# Patient Record
Sex: FEMALE | Race: WHITE | Employment: FULL TIME | URBAN - NONMETROPOLITAN AREA
[De-identification: names, ages, dates, MRNs, and addresses within clinical notes are randomized per-mention and may not be internally consistent; named-entity substitution may affect disease eponyms.]

---

## 2022-05-06 PROBLEM — F45.8 ACUTE HYPERVENTILATION SYNDROME: Status: ACTIVE | Noted: 2018-10-19

## 2022-05-06 PROBLEM — I89.0 LYMPHEDEMA OF BOTH LOWER EXTREMITIES: Status: ACTIVE | Noted: 2018-06-05

## 2022-05-06 PROBLEM — E53.8 VITAMIN B 12 DEFICIENCY: Status: ACTIVE | Noted: 2018-08-29

## 2022-05-06 PROBLEM — E65 ABDOMINAL PANNUS: Status: ACTIVE | Noted: 2020-03-11

## 2022-05-06 PROBLEM — I83.893 VARICOSE VEINS OF LEG WITH SWELLING, BILATERAL: Status: ACTIVE | Noted: 2018-06-25

## 2022-05-06 PROBLEM — M94.0 ACUTE COSTOCHONDRITIS: Status: ACTIVE | Noted: 2019-09-04

## 2022-05-06 PROBLEM — M35.00 SICCA (HCC): Status: ACTIVE | Noted: 2019-03-22

## 2022-05-06 PROBLEM — R73.03 PREDIABETES: Status: ACTIVE | Noted: 2018-09-04

## 2023-03-14 PROBLEM — Q23.81 BICUSPID AORTIC VALVE: Status: ACTIVE | Noted: 2023-03-14

## 2023-06-19 ENCOUNTER — TELEPHONE (OUTPATIENT)
Dept: NEUROLOGY | Age: 58
End: 2023-06-19

## 2023-06-19 NOTE — TELEPHONE ENCOUNTER
Received a referral from [facility] for this patient. Called and left patient a VM for patient to call our office back to where we can get patient scheduled an appointment with Dr. Vitaly Merino.

## 2023-09-14 ENCOUNTER — OFFICE VISIT (OUTPATIENT)
Dept: NEUROLOGY | Age: 58
End: 2023-09-14
Payer: MEDICAID

## 2023-09-14 VITALS
BODY MASS INDEX: 27.64 KG/M2 | HEART RATE: 61 BPM | DIASTOLIC BLOOD PRESSURE: 77 MMHG | SYSTOLIC BLOOD PRESSURE: 117 MMHG | WEIGHT: 172 LBS | HEIGHT: 66 IN

## 2023-09-14 DIAGNOSIS — M35.00 SJOGREN'S SYNDROME, WITH UNSPECIFIED ORGAN INVOLVEMENT (HCC): ICD-10-CM

## 2023-09-14 DIAGNOSIS — R76.8 POSITIVE ANA (ANTINUCLEAR ANTIBODY): ICD-10-CM

## 2023-09-14 DIAGNOSIS — G43.809 OTHER MIGRAINE WITHOUT STATUS MIGRAINOSUS, NOT INTRACTABLE: Primary | ICD-10-CM

## 2023-09-14 DIAGNOSIS — H53.149 PHOTOPHOBIA: ICD-10-CM

## 2023-09-14 PROCEDURE — 99204 OFFICE O/P NEW MOD 45 MIN: CPT | Performed by: PSYCHIATRY & NEUROLOGY

## 2023-09-14 RX ORDER — HYDROXYZINE HYDROCHLORIDE 10 MG/1
TABLET, FILM COATED ORAL
COMMUNITY
Start: 2023-08-23

## 2023-09-14 RX ORDER — GALCANEZUMAB 120 MG/ML
INJECTION, SOLUTION SUBCUTANEOUS
COMMUNITY
Start: 2023-07-18

## 2023-09-14 RX ORDER — FERROUS SULFATE 325(65) MG
325 TABLET ORAL DAILY
COMMUNITY
Start: 2023-09-07

## 2023-09-14 RX ORDER — RIZATRIPTAN BENZOATE 5 MG/1
TABLET ORAL
COMMUNITY
Start: 2023-08-08

## 2023-09-14 RX ORDER — LANSOPRAZOLE 30 MG/1
30 CAPSULE, DELAYED RELEASE ORAL DAILY
COMMUNITY
Start: 2023-05-29

## 2023-09-14 RX ORDER — ESZOPICLONE 3 MG/1
TABLET, FILM COATED ORAL
COMMUNITY
Start: 2023-07-14

## 2023-09-14 RX ORDER — PREGABALIN 75 MG/1
75 CAPSULE ORAL 2 TIMES DAILY
COMMUNITY
Start: 2023-09-02

## 2023-09-14 RX ORDER — DICYCLOMINE HYDROCHLORIDE 10 MG/1
10 CAPSULE ORAL
COMMUNITY
Start: 2023-07-20

## 2023-09-14 RX ORDER — ATOGEPANT 60 MG/1
60 TABLET ORAL DAILY
Qty: 30 TABLET | Refills: 11 | Status: SHIPPED | OUTPATIENT
Start: 2023-09-14

## 2023-09-14 NOTE — PROGRESS NOTES
Review of Systems    Constitutional - No fever or chills. No diaphoresis or significant fatigue. HENT -  No tinnitus or significant hearing loss. Eyes - no sudden vision change or eye pain  Respiratory - no significant shortness of breath or cough  Cardiovascular - no chest pain No palpitations or significant leg swelling  Gastrointestinal - yes abdominal swelling or pain. Genitourinary - No difficulty urinating, dysuria  Musculoskeletal - no back pain or myalgia. Skin - no color change or rash  Neurologic - No seizures. No lateralizing weakness. Hematologic - no easy bruising or excessive bleeding. Psychiatric - no severe anxiety or nervousness. All other review of systems are negative.
will be tried on Qulipta to see if this provides prophylaxis for migraines. The patient indicated understanding the management plan. She is to follow-up with me in approximately 2 months and call with any further problems. Plan    No orders of the defined types were placed in this encounter. Orders Placed This Encounter   Medications    Atogepant (QULIPTA) 60 MG TABS     Sig: Take 60 mg by mouth daily     Dispense:  30 tablet     Refill:  11       Return in about 2 months (around 11/14/2023). EMR Dragon/transcription disclaimer:Significant part of this  encounter note is electronic transcription/translation of spoken language to printed text. The electronic translation of spoken language may be erroneous, or at times, nonsensical words or phrases may be inadvertently transcribed.  Although I have reviewed the note for such errors, some may still exist.

## 2023-09-19 ENCOUNTER — TELEPHONE (OUTPATIENT)
Dept: NEUROLOGY | Age: 58
End: 2023-09-19

## 2023-09-19 NOTE — TELEPHONE ENCOUNTER
Annette Rogers (KeyStephen Blizzard) - [id number] help? Call us at [phone]  Status  Sent to Plan today  Next Steps  The plan will fax you a determination, typically within 1 to 5 business days. How do I follow up? Drug  Qulipta 60MG tablets  Form  [address] [facility] Prior Authorization Form  [facility] Med[facility] Request Form for [facility] Members  [phone]  [phone]      Waiting on response from insurance company. no

## 2023-09-19 NOTE — TELEPHONE ENCOUNTER
Called the patient and left her a voicemail letting her know that the prior authorization has been submitted and she can check with her pharmacy in a few days.

## 2023-09-27 ENCOUNTER — TELEPHONE (OUTPATIENT)
Dept: NEUROLOGY | Age: 58
End: 2023-09-27

## 2023-09-27 DIAGNOSIS — G43.809 OTHER MIGRAINE WITHOUT STATUS MIGRAINOSUS, NOT INTRACTABLE: Primary | ICD-10-CM

## 2023-09-27 RX ORDER — BUTALBITAL, ACETAMINOPHEN AND CAFFEINE 50; 325; 40 MG/1; MG/1; MG/1
1 TABLET ORAL EVERY 6 HOURS PRN
Qty: 30 TABLET | Refills: 3 | Status: SHIPPED | OUTPATIENT
Start: 2023-09-27

## 2023-09-27 NOTE — TELEPHONE ENCOUNTER
She is a started Smiley Barnard recently. Would continue for at least 1 to 2 months before stopping. Sent in some Fioricet.

## 2023-09-27 NOTE — TELEPHONE ENCOUNTER
Pt called and states over the past week she has had migraines off and on. She has take the Rizatriptan and the Erving Greek and neither of those medications are helping. Pt is asking if she should continue using the Erving Jamaican and if not, what can she do? Please advise.

## 2023-11-20 ENCOUNTER — TELEPHONE (OUTPATIENT)
Dept: NEUROLOGY | Age: 58
End: 2023-11-20

## 2023-11-20 NOTE — TELEPHONE ENCOUNTER
Pt called and needs Severa Spoon, the pharmacy , [address] [place] they say they need a pre auth for this, please contact the pharmacy, thanks !

## 2023-11-21 NOTE — TELEPHONE ENCOUNTER
eladia huynh (Key: [id number])Need help? Call us at [phone]  Status  Sent to Rickey  Next Steps  The plan will fax you a determination, typically within 1 to 5 business days. How do I follow up?   Drug  Qulipta 60MG tablets  Form  [address] [facility] Prior Authorization Form  [employer] Medication Request Form for [facility] Members  [phone]  [phone]

## 2023-12-01 ENCOUNTER — OFFICE VISIT (OUTPATIENT)
Dept: NEUROLOGY | Age: 58
End: 2023-12-01
Payer: COMMERCIAL

## 2023-12-01 VITALS
DIASTOLIC BLOOD PRESSURE: 76 MMHG | SYSTOLIC BLOOD PRESSURE: 118 MMHG | HEART RATE: 71 BPM | BODY MASS INDEX: 27.64 KG/M2 | HEIGHT: 66 IN | WEIGHT: 172 LBS

## 2023-12-01 DIAGNOSIS — G43.809 OTHER MIGRAINE WITHOUT STATUS MIGRAINOSUS, NOT INTRACTABLE: Primary | ICD-10-CM

## 2023-12-01 DIAGNOSIS — H53.149 PHOTOPHOBIA: ICD-10-CM

## 2023-12-01 DIAGNOSIS — R76.8 POSITIVE ANA (ANTINUCLEAR ANTIBODY): ICD-10-CM

## 2023-12-01 DIAGNOSIS — M35.00 SJOGREN'S SYNDROME, WITH UNSPECIFIED ORGAN INVOLVEMENT (HCC): ICD-10-CM

## 2023-12-01 PROCEDURE — 3017F COLORECTAL CA SCREEN DOC REV: CPT | Performed by: PSYCHIATRY & NEUROLOGY

## 2023-12-01 PROCEDURE — G8484 FLU IMMUNIZE NO ADMIN: HCPCS | Performed by: PSYCHIATRY & NEUROLOGY

## 2023-12-01 PROCEDURE — G8427 DOCREV CUR MEDS BY ELIG CLIN: HCPCS | Performed by: PSYCHIATRY & NEUROLOGY

## 2023-12-01 PROCEDURE — 99214 OFFICE O/P EST MOD 30 MIN: CPT | Performed by: PSYCHIATRY & NEUROLOGY

## 2023-12-01 PROCEDURE — 1036F TOBACCO NON-USER: CPT | Performed by: PSYCHIATRY & NEUROLOGY

## 2023-12-01 PROCEDURE — G8419 CALC BMI OUT NRM PARAM NOF/U: HCPCS | Performed by: PSYCHIATRY & NEUROLOGY

## 2023-12-01 RX ORDER — METAXALONE 800 MG/1
800 TABLET ORAL 3 TIMES DAILY
Qty: 90 TABLET | Refills: 5 | Status: SHIPPED | OUTPATIENT
Start: 2023-12-01 | End: 2023-12-31

## 2023-12-01 RX ORDER — SEMAGLUTIDE 0.68 MG/ML
0.5 INJECTION, SOLUTION SUBCUTANEOUS
COMMUNITY
Start: 2023-11-20 | End: 2023-12-22

## 2023-12-01 NOTE — PROGRESS NOTES
Review of Systems    Constitutional - No fever or chills. No diaphoresis or significant fatigue. HENT -  No tinnitus or significant hearing loss. Eyes - no sudden vision change or eye pain  Respiratory - no significant shortness of breath or cough  Cardiovascular - no chest pain No palpitations or significant leg swelling  Gastrointestinal - no abdominal swelling or pain. Genitourinary - No difficulty urinating, dysuria  Musculoskeletal - no back pain or myalgia. Skin - no color change or rash  Neurologic - No seizures. No lateralizing weakness. Hematologic - no easy bruising or excessive bleeding. Psychiatric - no severe anxiety or nervousness. All other review of systems are negative.
tablet     Sig: Take 1 tablet by mouth 3 times daily     Dispense:  90 tablet     Refill:  5       Return in about 3 months (around 3/1/2024). EMR Dragon/transcription disclaimer:Significant part of this  encounter note is electronic transcription/translation of spoken language to printed text. The electronic translation of spoken language may be erroneous, or at times, nonsensical words or phrases may be inadvertently transcribed.  Although I have reviewed the note for such errors, some may still exist.

## 2023-12-04 ENCOUNTER — TELEPHONE (OUTPATIENT)
Dept: NEUROLOGY | Age: 58
End: 2023-12-04

## 2023-12-04 NOTE — TELEPHONE ENCOUNTER
eladia huynh (Key: [id number])  PA Case ID #: [id number]  Need Help? Call us at [phone]  Outcome  Denied on December 1  Request Reference Number: [id number]. QULIPTA TAB 60MG is denied for not meeting the prior authorization requirement(s). Details of this decision are in the notice attached below or have been faxed to you.

## 2024-02-21 ENCOUNTER — PATIENT MESSAGE (OUTPATIENT)
Dept: NEUROLOGY | Age: 59
End: 2024-02-21

## 2024-02-21 ENCOUNTER — TELEPHONE (OUTPATIENT)
Dept: NEUROLOGY | Age: 59
End: 2024-02-21

## 2024-02-21 RX ORDER — RIMEGEPANT SULFATE 75 MG/75MG
75 TABLET, ORALLY DISINTEGRATING ORAL PRN
Qty: 30 TABLET | Refills: 5 | Status: SHIPPED | OUTPATIENT
Start: 2024-02-21

## 2024-02-21 NOTE — TELEPHONE ENCOUNTER
The request for 16 NURTEC TAB 75MG ODT per month is denied. This decision is based on health plan criteria for NURTEC TAB 75MG ODT. More than 8 tablets per month is covered only if:

## 2024-02-21 NOTE — TELEPHONE ENCOUNTER
Sent in Nurtec and Ubrelvy. She saw later she has tried Ubrelvy. Can try Nurtec to see if that helps and can take every other day if needed for prophylaxis as well.

## 2024-02-21 NOTE — TELEPHONE ENCOUNTER
Sherry Fuchs (Key: [id number])  PA Case ID #: [id number]  Rx #: [id number]  Need Help? Call us at [phone]  Status  Sent to Plan today  Drug  Nurtec 75MG dispersible tablets    Form  [employer] Electronic Prior Authorization Form (2017 NCPDP)  Original Claim Info  353

## 2024-02-26 ENCOUNTER — TELEPHONE (OUTPATIENT)
Dept: NEUROLOGY | Age: 59
End: 2024-02-26

## 2024-02-26 NOTE — TELEPHONE ENCOUNTER
BÁRBARA ROLAND (Key: [id number])  PA Case ID #: [id number]  Rx #: [id number]  Need Help? Call us at [phone]  Status  Sent to Plan today  Drug  Nurtec 75MG dispersible tablets    Form  [employer] Electronic Prior Authorization Form (2017 NCPDP)  Original Claim Info

## 2024-02-28 NOTE — TELEPHONE ENCOUNTER
BÁRBARA ROLAND (Key: [id number])  PA Case ID #: [id number]  Rx #: [id number]  Need Help? Call us at [phone]  Outcome  Denied on February 27  Request Reference Number: [id number]. NURTEC TAB 75MG ODT is denied for not meeting the prior authorization requirement(s). Details of this decision are in the notice attached below or have been faxed to you.  Drug  Nurtec 75MG dispersible tablets    Form  [employer] Electronic Prior Authorization Form (2017 NCPDP)  Original Claim Info  76

## 2024-03-06 ENCOUNTER — OFFICE VISIT (OUTPATIENT)
Dept: NEUROLOGY | Age: 59
End: 2024-03-06
Payer: COMMERCIAL

## 2024-03-06 VITALS
HEIGHT: 66 IN | WEIGHT: 172 LBS | DIASTOLIC BLOOD PRESSURE: 71 MMHG | BODY MASS INDEX: 27.64 KG/M2 | SYSTOLIC BLOOD PRESSURE: 105 MMHG | HEART RATE: 63 BPM

## 2024-03-06 DIAGNOSIS — R76.8 POSITIVE ANA (ANTINUCLEAR ANTIBODY): ICD-10-CM

## 2024-03-06 DIAGNOSIS — G43.809 OTHER MIGRAINE WITHOUT STATUS MIGRAINOSUS, NOT INTRACTABLE: Primary | ICD-10-CM

## 2024-03-06 DIAGNOSIS — H53.149 PHOTOPHOBIA: ICD-10-CM

## 2024-03-06 PROCEDURE — G8484 FLU IMMUNIZE NO ADMIN: HCPCS | Performed by: PSYCHIATRY & NEUROLOGY

## 2024-03-06 PROCEDURE — G8427 DOCREV CUR MEDS BY ELIG CLIN: HCPCS | Performed by: PSYCHIATRY & NEUROLOGY

## 2024-03-06 PROCEDURE — G8419 CALC BMI OUT NRM PARAM NOF/U: HCPCS | Performed by: PSYCHIATRY & NEUROLOGY

## 2024-03-06 PROCEDURE — 1036F TOBACCO NON-USER: CPT | Performed by: PSYCHIATRY & NEUROLOGY

## 2024-03-06 PROCEDURE — 3017F COLORECTAL CA SCREEN DOC REV: CPT | Performed by: PSYCHIATRY & NEUROLOGY

## 2024-03-06 PROCEDURE — 99214 OFFICE O/P EST MOD 30 MIN: CPT | Performed by: PSYCHIATRY & NEUROLOGY

## 2024-03-06 RX ORDER — RANOLAZINE 500 MG/1
500 TABLET, EXTENDED RELEASE ORAL 2 TIMES DAILY
COMMUNITY
Start: 2024-03-06

## 2024-03-06 NOTE — PROGRESS NOTES
Chief Complaint   Patient presents with    Migraine       Sherry Fuchs is a 58 y.o. year old female who is seen for evaluation of myasthenia gravis.  The patient indicates that she has several orders including Sjogren's, lupus, and Hashimoto.  She is unclear if she has myasthenia.  She denies any weakness, diplopia, ptosis, or shortness of breath.  She has had her esophagus stretched several times.  She indicates she has been had blood test she believes for myasthenia but the results are unavailable at this time and she does not know the results.  She does have a chronic history of migraines since childhood.  About 6 or 7 years ago she had several head injuries and following this her migraines worsen.  They were daily initially but they did improve with some treatment.  Currently takes Emgality and Maxalt.  Ubrelvy did not help.  She has tried Imitrex, Topamax, and amitriptyline.  She indicates she has 3 or 4 migraines a month at this point but they wake her from sleep.  She does have chronic issues with sleep. Migraines less at night with Qulipta. Maybe 1 week in day. Maxalt for that. Pain in the neck. Insurance not cover Qulipta. Only on Ubrelvy.           Active Ambulatory Problems     Diagnosis Date Noted    Abdominal pannus 03/11/2020    Acute costochondritis 09/04/2019    Acute hyperventilation syndrome 10/19/2018    Bicuspid aortic valve 11/02/2009    Calculus of kidney 12/05/2018    Chondromalacia of left knee 12/28/2016    Chronic fatigue 03/22/2019    Chronic migraine w/o aura w/o status migrainosus, not intractable 01/05/2018    Complication of surgical procedure 01/06/2015    Congenital insufficiency of aortic valve 11/02/2009    DDD (degenerative disc disease), lumbar 01/06/2015    Esophageal reflux 11/02/2009    Hashimoto's thyroiditis 07/11/2016    Hypoglycemia 07/08/2020    Irritable bowel syndrome without diarrhea 08/10/2016    Lumbar facet arthropathy 01/06/2015    Lymphedema of both lower 
Review of Systems    Constitutional - No fever or chills.  No diaphoresis or significant fatigue.  HENT -  No tinnitus or significant hearing loss.  Eyes - yes sudden vision change or eye pain  Respiratory - yes significant shortness of breath or cough  Cardiovascular - yes chest pain yes palpitations yes significant leg swelling  Gastrointestinal - no abdominal swelling or pain.    Genitourinary - No difficulty urinating, dysuria  Musculoskeletal - yes back pain or myalgia.  Skin - no color change or rash  Neurologic - No seizures.  No lateralizing weakness.  Hematologic - yes easy bruising or excessive bleeding.  Psychiatric - no severe anxiety or nervousness.   All other review of systems are negative.    
stable

## 2024-04-22 ENCOUNTER — TELEPHONE (OUTPATIENT)
Dept: CARDIAC SURGERY | Facility: CLINIC | Age: 59
End: 2024-04-22

## 2024-04-22 NOTE — TELEPHONE ENCOUNTER
Pt called back re: this and states that Dr Wolf was supposed to have called last week re: this.  Pt to have her testing here so she does not have to drive all the way to [facility] just for the testing.  Informed pt that we did not have a message re: this and that I would add this to her message to see if possibly Dr Wolf relayed this request directly to Dr Washington and that he hasn't given us the order yet since he's been in surgery.  Pt voiced understanding/kahm

## 2024-04-22 NOTE — TELEPHONE ENCOUNTER
"  Caller: Kelli Carter \"Carli\"    Relationship: Self    Best call back number: [phone]    What is the best time to reach you: ANY    Who are you requesting to speak with (clinical staff, provider,  specific staff member): DR. RIVERO NURSE    Do you know the name of the person who called: PT    What was the call regarding: PT STATES SHE WAS SUPPOSED TO BE GETTING A CALL FROM US TO DO A NUCLEAR TEST THIS WEEK AND SHE HAS NOT BEEN CONTACTED. PLEASE GIVE HER A CALL TO DISCUSS SCHEDULING. THANK YOU    Is it okay if the provider responds through iPositioning[employer]: NO        "

## 2024-04-24 NOTE — TELEPHONE ENCOUNTER
Called patient. She reports lots of anxiety, increase in shortness of breath and fatigue. Dr. Washington discussed his conversation with Dr. Wolf and Dr. Bay-recommend additional testing to further evaluate myocardial bridging, she is agreeable to drive to [place] for this test as it is not available here. A PET scan is recommended. She would like to do it 4/29-4/30 or 5/6 as she is off work these days.     She was advised if she wishes to proceed with surgical workup here than she needs this test; however if she wants to proceed with surgery at [facility], this testing is to their discretion. She relays that she wishes to proceed with surgery at [facility] by Dr. Wolf. She was advised that all follow up visits will be by Dr. Wolf or his staff and not here. She verbalizes understanding. Defer additional testing to Dr. Bay. mg

## 2024-04-24 NOTE — TELEPHONE ENCOUNTER
Pt calling back.  HUB attempted to inform pt that Dr Wolf could send order for this testing but pt informed them that Dr Wolf spoke with Dr Washington before Dr Wolf left out of the country on vacation and that Dr Washington told him he would take care of this.  Pt is adamant that she needs a call back today about this.  Informed pt I would put a message through with this updated information/coco

## 2024-05-21 ENCOUNTER — PATIENT MESSAGE (OUTPATIENT)
Dept: INTERNAL MEDICINE | Facility: CLINIC | Age: 59
End: 2024-05-21
Payer: COMMERCIAL

## 2024-05-22 ENCOUNTER — OFFICE VISIT (OUTPATIENT)
Dept: INTERNAL MEDICINE | Facility: CLINIC | Age: 59
End: 2024-05-22
Payer: COMMERCIAL

## 2024-05-22 VITALS
SYSTOLIC BLOOD PRESSURE: 108 MMHG | BODY MASS INDEX: 26.13 KG/M2 | WEIGHT: 162.6 LBS | HEART RATE: 43 BPM | DIASTOLIC BLOOD PRESSURE: 74 MMHG | HEIGHT: 66 IN | OXYGEN SATURATION: 99 % | TEMPERATURE: 97.4 F

## 2024-05-22 DIAGNOSIS — E03.9 ACQUIRED HYPOTHYROIDISM: ICD-10-CM

## 2024-05-22 DIAGNOSIS — E11.9 TYPE 2 DIABETES MELLITUS WITHOUT COMPLICATION, WITHOUT LONG-TERM CURRENT USE OF INSULIN: ICD-10-CM

## 2024-05-22 DIAGNOSIS — F06.4 ANXIETY DISORDER DUE TO MEDICAL CONDITION: Primary | ICD-10-CM

## 2024-05-22 PROCEDURE — 99213 OFFICE O/P EST LOW 20 MIN: CPT | Performed by: INTERNAL MEDICINE

## 2024-05-22 NOTE — PROGRESS NOTES
"    Chief Complaint  Anxiety (Possible change in medication, having memory loss that she has not had before)    Subjective        Kelli Pedro presents to [facility]  Anxiety      See below.     Objective   Vital Signs:  /74 (BP Location: Left arm, Patient Position: Sitting, Cuff Size: Adult)   Pulse (!) 43   Temp 97.4 °F (36.3 °C) (Temporal)   Ht 167.6 cm (65.98\")   Wt 73.8 kg (162 lb 9.6 oz)   SpO2 99%   BMI 26.26 kg/m²   Estimated body mass index is 26.26 kg/m² as calculated from the following:    Height as of this encounter: 167.6 cm (65.98\").    Weight as of this encounter: 73.8 kg (162 lb 9.6 oz).         Physical Exam  HENT:      Head: Normocephalic and atraumatic.   Eyes:      Conjunctiva/sclera: Conjunctivae normal.      Pupils: Pupils are equal, round, and reactive to light.   Cardiovascular:      Rate and Rhythm: Normal rate and regular rhythm.      Heart sounds: Normal heart sounds.   Pulmonary:      Effort: Pulmonary effort is normal. No respiratory distress.      Breath sounds: Normal breath sounds.   Musculoskeletal:         General: No swelling.   Skin:     General: Skin is warm and dry.      Findings: No rash.   Neurological:      General: No focal deficit present.      Mental Status: She is alert and oriented to person, place, and time.   Psychiatric:         Mood and Affect: Mood normal.         Behavior: Behavior normal.         Thought Content: Thought content normal.         Judgment: Judgment normal.       Result Review :  Nothing new to review.         Assessment and Plan   Diagnoses and all orders for this visit:    1. Anxiety disorder due to medical condition (Primary)    2. Acquired hypothyroidism  -     TSH  -     T4, free    3. Type 2 diabetes mellitus without complication, without long-term current use of insulin  -     Hemoglobin A1c       Presents today because she feels she is having side effects to the recent start of anxiety " "medications.      I saw her at the end of April for complaints of anxiety primarily secondary to her upcoming surgical procedure at [facility] in [place].  I placed her on BuSpar and Paxil.  Her anxiety is much better controlled.  I think that is due to the Paxil after discussing this with her.  She was initially taking the BuSpar 3 times a day scheduled instead of as needed.  She has started backing off and has only been taking it once daily.  She feels like after she takes it she gets forgetful at work and feels \"foggy.\"  Stay on Paxil. Stop Buspar.  I feel like BuSpar is more likely the culprit of the issue.    She has not had thyroid function since November.  Check TFTs.    Also due for an A1c.    She states that she is now required to do some other testing prior to her surgical intervention at [facility].  She was initially scheduled to undergo the procedure on 5/31 and this has now been pushed to 6/18.    We have a standing appointment at the end of July, but she also knows that she will likely follow-up with us shortly after her surgical intervention.        Follow Up   Return for Next scheduled follow up.  Patient was given instructions and counseling regarding her condition or for health maintenance advice. Please see specific information pulled into the AVS if appropriate.      HALLE Tidwell DO       Electronically signed by ELLEN Tidwell DO, 05/22/24, 9:23 AM CDT.  "

## 2024-05-23 LAB
HBA1C MFR BLD: 5.2 % (ref 4.8–5.6)
T4 FREE SERPL-MCNC: 1.23 NG/DL (ref 0.93–1.7)
TSH SERPL DL<=0.005 MIU/L-ACNC: 1.19 UIU/ML (ref 0.27–4.2)

## 2024-05-24 ENCOUNTER — TELEPHONE (OUTPATIENT)
Dept: INTERNAL MEDICINE | Facility: CLINIC | Age: 59
End: 2024-05-24

## 2024-05-24 NOTE — TELEPHONE ENCOUNTER
"Caller: Kelli Pedro \"Carli\"    Relationship: Self    Best call back number: [phone]     What form or medical record are you requesting: PRIOR AUTHORIZATION FOR LIDOCAINE PATCHES    Who is requesting this form or medical record from you: SELF    How would you like to receive the form or medical records (pick-up, mail, fax): FAX    Timeframe paperwork needed: AS SOON AS POSSIBLE    Additional notes: PLEASE CALL BACK WHEN THIS HAS BEEN APPROVED.           "

## 2024-05-28 ENCOUNTER — TELEPHONE (OUTPATIENT)
Dept: INTERNAL MEDICINE | Facility: CLINIC | Age: 59
End: 2024-05-28

## 2024-05-28 DIAGNOSIS — F51.04 PSYCHOPHYSIOLOGICAL INSOMNIA: ICD-10-CM

## 2024-05-28 DIAGNOSIS — F06.4 ANXIETY DISORDER DUE TO MEDICAL CONDITION: ICD-10-CM

## 2024-05-28 RX ORDER — PAROXETINE 10 MG/1
10 TABLET, FILM COATED ORAL EVERY MORNING
Qty: 30 TABLET | Refills: 1 | Status: SHIPPED | OUTPATIENT
Start: 2024-05-28

## 2024-05-28 RX ORDER — ESZOPICLONE 3 MG/1
3 TABLET, FILM COATED ORAL NIGHTLY
Qty: 90 TABLET | Refills: 0 | Status: SHIPPED | OUTPATIENT
Start: 2024-05-28

## 2024-05-28 NOTE — TELEPHONE ENCOUNTER
Caller: neelam- -[employer]    Relationship: Other    Best call back number:     [phone]     Who are you requesting to speak with (clinical staff, provider,  specific staff member):CLINICAL    What was the call regarding:  NEELAM FROM [employer] STATES THE PATIENT'S LIDOCAINE PATCHES WERE DENIED AND STATES THAT SHE WOULD LIKE TO PROVIDE THE [facility] APPEALS NUMBER TO THE CLINICAL TEAM: [phone]  ADDITIONALLY, NEELAM STATES THAT SHE IS SENDING A FAX TO THE OFFICE WITH THE DENIAL REASON.

## 2024-06-11 ENCOUNTER — LAB (OUTPATIENT)
Dept: LAB | Facility: HOSPITAL | Age: 59
End: 2024-06-11
Payer: COMMERCIAL

## 2024-06-11 ENCOUNTER — APPOINTMENT (OUTPATIENT)
Dept: LAB | Facility: HOSPITAL | Age: 59
End: 2024-06-11
Payer: COMMERCIAL

## 2024-06-11 ENCOUNTER — TRANSCRIBE ORDERS (OUTPATIENT)
Dept: ADMINISTRATIVE | Facility: HOSPITAL | Age: 59
End: 2024-06-11
Payer: COMMERCIAL

## 2024-06-11 DIAGNOSIS — Z79.899 ENCOUNTER FOR LONG-TERM (CURRENT) USE OF OTHER MEDICATIONS: ICD-10-CM

## 2024-06-11 DIAGNOSIS — E11.65 TYPE 2 DIABETES MELLITUS WITH HYPERGLYCEMIA, WITHOUT LONG-TERM CURRENT USE OF INSULIN: Primary | ICD-10-CM

## 2024-06-11 DIAGNOSIS — D64.9 ANEMIA, UNSPECIFIED TYPE: Primary | ICD-10-CM

## 2024-06-11 DIAGNOSIS — E11.65 TYPE 2 DIABETES MELLITUS WITH HYPERGLYCEMIA, WITHOUT LONG-TERM CURRENT USE OF INSULIN: ICD-10-CM

## 2024-06-11 DIAGNOSIS — D64.9 ANEMIA, UNSPECIFIED TYPE: ICD-10-CM

## 2024-06-11 DIAGNOSIS — M32.19 DILATED CARDIOMYOPATHY SECONDARY TO SYSTEMIC LUPUS ERYTHEMATOSUS: ICD-10-CM

## 2024-06-11 DIAGNOSIS — E03.8 OTHER SPECIFIED HYPOTHYROIDISM: ICD-10-CM

## 2024-06-11 DIAGNOSIS — I43 DILATED CARDIOMYOPATHY SECONDARY TO SYSTEMIC LUPUS ERYTHEMATOSUS: ICD-10-CM

## 2024-06-11 LAB
ALBUMIN SERPL-MCNC: 4.2 G/DL (ref 3.5–5.2)
ALBUMIN/GLOB SERPL: 1.7 G/DL
ALP SERPL-CCNC: 103 U/L (ref 39–117)
ALT SERPL W P-5'-P-CCNC: 13 U/L (ref 1–33)
ANION GAP SERPL CALCULATED.3IONS-SCNC: 8 MMOL/L (ref 5–15)
AST SERPL-CCNC: 16 U/L (ref 1–32)
BASOPHILS # BLD AUTO: 0.03 10*3/MM3 (ref 0–0.2)
BASOPHILS NFR BLD AUTO: 0.8 % (ref 0–1.5)
BILIRUB SERPL-MCNC: 0.4 MG/DL (ref 0–1.2)
BUN SERPL-MCNC: 12 MG/DL (ref 6–20)
BUN/CREAT SERPL: 16.2 (ref 7–25)
C3 SERPL-MCNC: 142 MG/DL (ref 82–167)
C4 SERPL-MCNC: 22 MG/DL (ref 14–44)
CALCIUM SPEC-SCNC: 9.2 MG/DL (ref 8.6–10.5)
CHLORIDE SERPL-SCNC: 100 MMOL/L (ref 98–107)
CO2 SERPL-SCNC: 31 MMOL/L (ref 22–29)
CREAT SERPL-MCNC: 0.74 MG/DL (ref 0.57–1)
DEPRECATED RDW RBC AUTO: 45.9 FL (ref 37–54)
EGFRCR SERPLBLD CKD-EPI 2021: 93.9 ML/MIN/1.73
EOSINOPHIL # BLD AUTO: 0.06 10*3/MM3 (ref 0–0.4)
EOSINOPHIL NFR BLD AUTO: 1.6 % (ref 0.3–6.2)
ERYTHROCYTE [DISTWIDTH] IN BLOOD BY AUTOMATED COUNT: 13.4 % (ref 12.3–15.4)
FERRITIN SERPL-MCNC: 60.74 NG/ML (ref 13–150)
GLOBULIN UR ELPH-MCNC: 2.5 GM/DL
GLUCOSE SERPL-MCNC: 72 MG/DL (ref 65–99)
HBA1C MFR BLD: 5 % (ref 4.8–5.6)
HCT VFR BLD AUTO: 37.6 % (ref 34–46.6)
HGB BLD-MCNC: 12.6 G/DL (ref 12–15.9)
IMM GRANULOCYTES # BLD AUTO: 0.01 10*3/MM3 (ref 0–0.05)
IMM GRANULOCYTES NFR BLD AUTO: 0.3 % (ref 0–0.5)
IRON 24H UR-MRATE: 98 MCG/DL (ref 37–145)
IRON SATN MFR SERPL: 26 % (ref 20–50)
LYMPHOCYTES # BLD AUTO: 0.89 10*3/MM3 (ref 0.7–3.1)
LYMPHOCYTES NFR BLD AUTO: 24.3 % (ref 19.6–45.3)
MCH RBC QN AUTO: 31.5 PG (ref 26.6–33)
MCHC RBC AUTO-ENTMCNC: 33.5 G/DL (ref 31.5–35.7)
MCV RBC AUTO: 94 FL (ref 79–97)
MONOCYTES # BLD AUTO: 0.3 10*3/MM3 (ref 0.1–0.9)
MONOCYTES NFR BLD AUTO: 8.2 % (ref 5–12)
NEUTROPHILS NFR BLD AUTO: 2.37 10*3/MM3 (ref 1.7–7)
NEUTROPHILS NFR BLD AUTO: 64.8 % (ref 42.7–76)
NRBC BLD AUTO-RTO: 0 /100 WBC (ref 0–0.2)
PLATELET # BLD AUTO: 178 10*3/MM3 (ref 140–450)
PMV BLD AUTO: 11.4 FL (ref 6–12)
POTASSIUM SERPL-SCNC: 3.7 MMOL/L (ref 3.5–5.2)
PROT SERPL-MCNC: 6.7 G/DL (ref 6–8.5)
RBC # BLD AUTO: 4 10*6/MM3 (ref 3.77–5.28)
SODIUM SERPL-SCNC: 139 MMOL/L (ref 136–145)
T4 FREE SERPL-MCNC: 1.21 NG/DL (ref 0.93–1.7)
TIBC SERPL-MCNC: 381 MCG/DL (ref 298–536)
TRANSFERRIN SERPL-MCNC: 256 MG/DL (ref 200–360)
TSH SERPL DL<=0.05 MIU/L-ACNC: 1.99 UIU/ML (ref 0.27–4.2)
WBC NRBC COR # BLD AUTO: 3.66 10*3/MM3 (ref 3.4–10.8)

## 2024-06-11 PROCEDURE — 84466 ASSAY OF TRANSFERRIN: CPT

## 2024-06-11 PROCEDURE — 86160 COMPLEMENT ANTIGEN: CPT

## 2024-06-11 PROCEDURE — 84439 ASSAY OF FREE THYROXINE: CPT

## 2024-06-11 PROCEDURE — 83540 ASSAY OF IRON: CPT

## 2024-06-11 PROCEDURE — 82728 ASSAY OF FERRITIN: CPT

## 2024-06-11 PROCEDURE — 83036 HEMOGLOBIN GLYCOSYLATED A1C: CPT

## 2024-06-11 PROCEDURE — 36415 COLL VENOUS BLD VENIPUNCTURE: CPT

## 2024-06-11 PROCEDURE — 84443 ASSAY THYROID STIM HORMONE: CPT

## 2024-06-11 PROCEDURE — 85025 COMPLETE CBC W/AUTO DIFF WBC: CPT

## 2024-06-11 PROCEDURE — 80053 COMPREHEN METABOLIC PANEL: CPT

## 2024-06-26 ENCOUNTER — TELEPHONE (OUTPATIENT)
Dept: INTERNAL MEDICINE | Facility: CLINIC | Age: 59
End: 2024-06-26

## 2024-06-26 NOTE — TELEPHONE ENCOUNTER
[facility] health called back   Let them know Diann will follow but patient will need to call and schedule a hospital follow up and keep appointment for us to continue to sign off on home health

## 2024-06-26 NOTE — TELEPHONE ENCOUNTER
Caller: ALEXSANDER- GRACIELA Quaker    Relationship: Other    Best call back number: [phone] - [facility]      Who are you requesting to speak with (clinical staff, provider,  specific staff member): CLINICAL    What was the call regarding: ALEXSANDER STATES THE PATIENT WILL BE DISCHARGED FROM [facility] ON 6/27/24 AND NEEDS A CALLBACK AS SOON AS POSSIBLE TO CONFIRM IF DR. GARCIA WILL SIGN THE PATIENT'S HOME HEALTH PAPERWORK AND FOLLOW THE PATIENT FOLLOWING DISCHARGE ON 6/27/24.

## 2024-06-28 ENCOUNTER — TELEPHONE (OUTPATIENT)
Dept: CARDIOLOGY | Facility: CLINIC | Age: 59
End: 2024-06-28

## 2024-06-28 ENCOUNTER — READMISSION MANAGEMENT (OUTPATIENT)
Dept: CALL CENTER | Facility: HOSPITAL | Age: 59
End: 2024-06-28
Payer: COMMERCIAL

## 2024-06-28 ENCOUNTER — TRANSITIONAL CARE MANAGEMENT TELEPHONE ENCOUNTER (OUTPATIENT)
Dept: CALL CENTER | Facility: HOSPITAL | Age: 59
End: 2024-06-28
Payer: COMMERCIAL

## 2024-06-28 NOTE — OUTREACH NOTE
Prep Survey      Flowsheet Row Responses   [place] facility patient discharged from? Non-BH   Is LACE score < 7 ? Non-BH Discharge   Eligibility [facility]   Date of Admission 06/26/24   Date of Discharge 06/26/24   Discharge Disposition Home or Self Care   Discharge diagnosis IMPLANT DUAL CHAMBER PPM SYSTEM W/ DUAL ELECTRODES (GEN AND LEADS, NEW OR REPLACE)   Does the patient have one of the following disease processes/diagnoses(primary or secondary)? Other   Does the patient have Home health ordered? No   Is there a DME ordered? No   Prep survey completed? Yes            Charmaine HAMMOND - Registered Nurse

## 2024-06-28 NOTE — TELEPHONE ENCOUNTER
"Caller: Kelli Pedro \"Carli\"    Relationship to patient: Self    Best call back number: [phone]     Chief complaint: PT STATED THAT SHE HAD 3 HEART SURGERIES ON 6.18.24 AND 6.26.24. SHE IS UPSET ABOUT HER APPT BEING CHANGED TO AUGUST BECAUSE SHE FEELS LIKE SHE SHOULD NOT WAIT UNTIL AUGUST TO BE SEEN    Type of visit: FOLLOW UP     Requested date: AROUND JULY      If rescheduling, when is the original appointment: 8.27.24     Additional notes:       "

## 2024-06-28 NOTE — OUTREACH NOTE
Call Center TCM Note      Flowsheet Row Responses   [facility] [place] patient discharged from? Non-   Does the patient have one of the following disease processes/diagnoses(primary or secondary)? Other   TCM attempt successful? Yes   Call start time 1459   Call end time 1502   Discharge diagnosis IMPLANT DUAL CHAMBER PPM SYSTEM W/ DUAL ELECTRODES (GEN AND LEADS, NEW OR REPLACE)   Person spoke with today (if not patient) and relationship pt   Meds reviewed with patient/caregiver? Yes   Is the patient having any side effects they believe may be caused by any medication additions or changes? No   Does the patient have all medications ordered at discharge? Yes   Is the patient taking all medications as directed (includes completed medication regime)? Yes   Comments Pt awaiting for cardiology to call with fu appts   Does the patient have an appointment with their PCP within 7-14 days of discharge? Yes   Psychosocial issues? No   Did the patient receive a copy of their discharge instructions? Yes   Nursing interventions Reviewed instructions with patient   What is the patient's perception of their health status since discharge? Improving   Is the patient/caregiver able to teach back signs and symptoms related to disease process for when to call PCP? Yes   Is the patient/caregiver able to teach back signs and symptoms related to disease process for when to call 911? Yes   Is the patient/caregiver able to teach back the hierarchy of who to call/visit for symptoms/problems? PCP, Specialist, Home health nurse, Urgent Care, ED, 911 Yes   If the patient is a current smoker, are they able to teach back resources for cessation? Not a smoker   TCM call completed? Yes   Wrap up additional comments Pt denies any issues with incisions. Pt shares Lipitor and aspirin were added to medication regime. Pt has PCP fu appt, and awaiting for cardiology/electrophysiologist office to call with appt.   Call end time 1502   Would this patient  benefit from a Referral to [facility] Social Work? No   Is the patient interested in additional calls from an ambulatory ? No            Sarai Brownlee RN    6/28/2024, 15:05 CDT

## 2024-07-03 ENCOUNTER — OFFICE VISIT (OUTPATIENT)
Dept: INTERNAL MEDICINE | Facility: CLINIC | Age: 59
End: 2024-07-03
Payer: COMMERCIAL

## 2024-07-03 ENCOUNTER — TELEPHONE (OUTPATIENT)
Dept: CARDIOLOGY | Facility: CLINIC | Age: 59
End: 2024-07-03
Payer: COMMERCIAL

## 2024-07-03 DIAGNOSIS — I10 ESSENTIAL HYPERTENSION: ICD-10-CM

## 2024-07-03 DIAGNOSIS — Z95.0 STATUS POST PLACEMENT OF CARDIAC PACEMAKER: ICD-10-CM

## 2024-07-03 DIAGNOSIS — Z95.2 STATUS POST AORTIC VALVE REPLACEMENT: ICD-10-CM

## 2024-07-03 DIAGNOSIS — Z09 HOSPITAL DISCHARGE FOLLOW-UP: Primary | ICD-10-CM

## 2024-07-03 DIAGNOSIS — E11.9 TYPE 2 DIABETES MELLITUS WITHOUT COMPLICATION, WITHOUT LONG-TERM CURRENT USE OF INSULIN: ICD-10-CM

## 2024-07-03 DIAGNOSIS — Z95.828 HISTORY OF AORTIC ARCH REPLACEMENT: ICD-10-CM

## 2024-07-03 DIAGNOSIS — Q24.5 CORONARY-MYOCARDIAL BRIDGE: ICD-10-CM

## 2024-07-03 DIAGNOSIS — I44.2 INTERMITTENT COMPLETE HEART BLOCK: ICD-10-CM

## 2024-07-03 NOTE — PROGRESS NOTES
"    Chief Complaint  Hospital Follow Up Visit (D/C [facility] 6/27/24)    Sarika Pedro presents to [facility]  History of Present Illness  See below.     Objective   Vital Signs:  /80 (BP Location: Right arm, Patient Position: Sitting, Cuff Size: Adult)   Pulse 103   Temp 97.2 °F (36.2 °C) (Temporal)   Ht 167.6 cm (66\")   Wt 74.8 kg (165 lb)   SpO2 99%   BMI 26.63 kg/m²   Estimated body mass index is 26.63 kg/m² as calculated from the following:    Height as of this encounter: 167.6 cm (66\").    Weight as of this encounter: 74.8 kg (165 lb).         Physical Exam  HENT:      Head: Normocephalic and atraumatic.   Eyes:      Conjunctiva/sclera: Conjunctivae normal.      Pupils: Pupils are equal, round, and reactive to light.   Cardiovascular:      Rate and Rhythm: Normal rate and regular rhythm.      Heart sounds: Normal heart sounds.      Comments: Sternotomy well-healing.  Pacer pocket well-healing.  Pulmonary:      Effort: Pulmonary effort is normal. No respiratory distress.      Breath sounds: Normal breath sounds.   Musculoskeletal:         General: No swelling.      Cervical back: Neck supple.   Skin:     General: Skin is warm and dry.      Findings: No rash.   Neurological:      General: No focal deficit present.      Mental Status: She is alert and oriented to person, place, and time.   Psychiatric:         Mood and Affect: Mood normal.         Behavior: Behavior normal.         Thought Content: Thought content normal.         Judgment: Judgment normal.        Result Review :  BMP on 6/26 showed a creatinine 0.8.  CBC on 6/26 showed a hemoglobin of 9.1.  Her hemoglobin prior to surgery was 12.5.  This was drawn on 6/7.    PA and lateral chest x-ray from 6/27 showed partial left lower lobar collapse and atelectasis in the left lung   base, increased from comparison.  Small left-sided pleural effusion. No right-sided pleural effusion.  No " pneumothorax or pulmonary edema. The cardiomediastinal contours are unchanged.     Echocardiogram from 6/24 showed a technically difficult study: Normal LV size, increased LV   wall thickness. LVEF 75%. Normal RV function. S/p bioAVR:  no AR, mean 6mmHg. Normal LA. Estimated PASP 19mmHg+RA pressure. IVC is not well visualized.          Assessment and Plan   Diagnoses and all orders for this visit:    1. Hospital discharge follow-up (Primary)    2. History of aortic arch replacement    3. Status post aortic valve replacement    4. Intermittent complete heart block    5. Status post placement of cardiac pacemaker    6. Coronary-myocardial bridge    7. Essential hypertension    8. Type 2 diabetes mellitus without complication, without long-term current use of insulin       The patient has a known history of 4.5 cm ascending aortic aneurysm with a bicuspid aortic valve with calcification.  She was also previously diagnosed with myocardial bridging on heart catheterization by Dr. Bay.  She had been seen locally by Dr. Washington.  She sought a second opinion at [facility] in [place], [place].  On 6/18, she underwent hemiarch replacement, aortic valve replacement with a bioprosthetic valve, unroofing of myocardial bridge of the LAD.  These were done by Dr. Wolf.  Overall, she did well post procedurally, but had episodes of intermittent complete heart block.  She continued to require temporary pacing and ultimately underwent a [employer]tr[employer] PPM placement on 6/26 with Dr. Gilmore.  She was discharged the following day on 6/27.    Today in the office, she looks very well.  She states that she is improving on a daily basis.  The chest discomfort that preceded the surgery has gone away, but of course she is now dealing with discomfort from her recent sternotomy.  It appears to be well-healing.  She states that she is to be on sternal precautions for 12 weeks.  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  Her  is present with her today.  He is pleased with her progress.    She is no longer on diltiazem CD.  She was on this previously to help combat myocardial bridging.  She has also come off Ranexa.  Blood pressure appears to be doing well at 130/80.  Continue ambulatory monitoring.    She is off Tradjenta.  She is no longer on GLP-1.  She follows with endocrinology in [place].  She has a freestyle joe to monitor blood sugars.  Her most recent hemoglobin A1c remained normal at 5.0.    She does not have any other complaints or have any other needs today.  This was simply a hospital follow-up.  It was not a TCM as contact was not made.  She follows with Dr. Bay again on 7/29 and sees Dr. Wolf in [place] on 8/12.  I have routine follow-up with her on 10/2, but she does know that she can reach out sooner if she has any problems.      Follow Up   Return in about 3 months (around 10/3/2024) for Recheck.  Patient was given instructions and counseling regarding her condition or for health maintenance advice. Please see specific information pulled into the AVS if appropriate.      HALLE Tidwell DO       Electronically signed by ELLEN Tidwell DO, 07/03/24, 2:57 PM CDT.

## 2024-07-03 NOTE — TELEPHONE ENCOUNTER
"  Caller: Abundio Ross \"Carli\"    Relationship: Self    Best call back number: [phone]     What is the best time to reach you: ANYTIME    Who are you requesting to speak with (clinical staff, provider,  specific staff member): [employer]INCAL STAFF    Do you know the name of the person who called: ABUNDIO ROSS    What was the call regarding: PATIENT WAS RECOMMENDED TO GET A MEMBERSHIP WITH A LOCAL AIR EVAC COMPANY IN CASE SHE NEEDS TO BE AIR LIFTED TO [facility] FOR TREATMENT. SHE IS NEEDING INFORMATION ON LOCAL COMPANIES THAT OFFER THIS SERVICE AS WELL AS INFORMATION IF THEY WILL FLY PATIENT'S OUT OF [facility] [place], operated by [facility].    "

## 2024-07-05 VITALS
OXYGEN SATURATION: 99 % | HEIGHT: 66 IN | HEART RATE: 103 BPM | DIASTOLIC BLOOD PRESSURE: 80 MMHG | WEIGHT: 165 LBS | TEMPERATURE: 97.2 F | SYSTOLIC BLOOD PRESSURE: 130 MMHG | BODY MASS INDEX: 26.52 KG/M2

## 2024-07-07 ENCOUNTER — NURSE TRIAGE (OUTPATIENT)
Dept: CALL CENTER | Facility: HOSPITAL | Age: 59
End: 2024-07-07
Payer: COMMERCIAL

## 2024-07-07 NOTE — TELEPHONE ENCOUNTER
" She recently had a triple bypass at [facility] and was released on 06/27/24- Attempted to triage the call. The caller declined requesting that the on call provider be notified. She is having vaginal bleeding. She started having vaginal bleeding after the trujillo cath was removed.She has not had a  menstrual cycle in many  year. Offered ER  and she declined.  She states is very little on  sanitary pad. It is red blood. No pain with urination. She states she is on baby asa as a blood thinner.     Notified STEPHANY Parker NP on call.   Referral to obgyn. Remind if get light heading go to the ER. Need to contact the office on Monday for a referral.     She did call [facility] and was not able to reach anyone.   Reason for Disposition   [1] Caller requests to speak ONLY to PCP AND [2] URGENT question    Additional Information   Negative: Lab calling with strep throat test results and triager can call in prescription   Negative: Lab calling with urinalysis test results and triager can call in prescription   Negative: Medication questions   Negative: Medication renewal and refill questions   Negative: Pre-operative or pre-procedural questions   Negative: ED call to PCP (i.e., primary care provider; doctor, NP, or PA)   Negative: Doctor (or NP/PA) call to PCP   Negative: Call about patient who is currently hospitalized   Negative: Lab or radiology calling with CRITICAL test results   Negative: [1] Follow-up call from patient regarding patient's clinical status AND [2] information urgent    Answer Assessment - Initial Assessment Questions  1. REASON FOR CALL or QUESTION: \"What is your reason for calling today?\" or \"How can I best  help you?\" or \"What question do you have that I can help answer?\"      See note   2. CALLER: Document the source of call. (e.g., laboratory, patient).      Patient.    Protocols used: PCP Call - No Triage-ADULT-    "

## 2024-07-08 DIAGNOSIS — N93.9 VAGINAL BLEEDING: Primary | ICD-10-CM

## 2024-07-22 ENCOUNTER — OFFICE VISIT (OUTPATIENT)
Dept: GASTROENTEROLOGY | Facility: CLINIC | Age: 59
End: 2024-07-22
Payer: COMMERCIAL

## 2024-07-22 VITALS
DIASTOLIC BLOOD PRESSURE: 74 MMHG | BODY MASS INDEX: 26.23 KG/M2 | HEART RATE: 96 BPM | OXYGEN SATURATION: 98 % | WEIGHT: 163.2 LBS | SYSTOLIC BLOOD PRESSURE: 110 MMHG | TEMPERATURE: 97.1 F | HEIGHT: 66 IN

## 2024-07-22 DIAGNOSIS — Z78.9 NONSMOKER: ICD-10-CM

## 2024-07-22 DIAGNOSIS — K59.01 SLOW TRANSIT CONSTIPATION: Primary | ICD-10-CM

## 2024-07-22 DIAGNOSIS — R60.0 PEDAL EDEMA: ICD-10-CM

## 2024-07-22 DIAGNOSIS — R74.8 ELEVATED ALKALINE PHOSPHATASE LEVEL: ICD-10-CM

## 2024-07-22 PROCEDURE — 99213 OFFICE O/P EST LOW 20 MIN: CPT | Performed by: CLINICAL NURSE SPECIALIST

## 2024-07-22 RX ORDER — ACETAMINOPHEN 500 MG
500 TABLET ORAL EVERY 4 HOURS PRN
COMMUNITY
Start: 2024-06-18

## 2024-07-22 RX ORDER — NALOXONE HYDROCHLORIDE 4 MG/.1ML
1 SPRAY NASAL AS NEEDED
COMMUNITY
Start: 2024-07-01

## 2024-07-22 RX ORDER — TRAMADOL HYDROCHLORIDE 50 MG/1
50 TABLET ORAL
COMMUNITY
Start: 2024-07-01

## 2024-07-22 RX ORDER — ASPIRIN 81 MG/1
81 TABLET, CHEWABLE ORAL DAILY
COMMUNITY
Start: 2024-06-18 | End: 2025-06-29

## 2024-07-22 RX ORDER — ATORVASTATIN CALCIUM 40 MG/1
40 TABLET, FILM COATED ORAL DAILY
COMMUNITY
Start: 2024-06-18 | End: 2025-06-28

## 2024-07-22 NOTE — PROGRESS NOTES
Kelli Pedro  1965      7/22/2024  Chief Complaint   Patient presents with    GI Problem     3 month fu         HPI    Kelli Pedro is a  58 y.o. female here for a follow up visit for IBS C  with extensive GI workup. She was pending cardiac surgery May 31st. She has had a colonoscopy in 2023 at [facility]. She is on Linzess 290 mcg for constipation. She says today that this is better. She did have her cardiac procedure and is recovering well. Overall no GI related issues.   ALKP was elevated at 118 on 4/1/24 6/11/24 incidental findings. ALKP 103 most recently. No associated symptoms.   Overall a difficult situation given her multiple medical issues and complaints in the setting of cardiac related findings.     4/23/24  Kelli Pedro is a 58 y.o. female who presents with a continued complaint of LLQ abdominal pain.   I last saw her on 3/13/24 with complaint of NCCP 1/18/24   She had endo/colon in 2023 and I ordered upper GI to evaluate. Small hiatal hernia observed no reflux. CT angio of the chest with contrast performed and being followed by Dr Washington. She says that Nitro eradicates her symptoms completely.   Heart cath showed no significant CAD, she had significant myocardial bridging and vasospasm noted mid LAD causing STANLEY II flow during episode of vasospasm. Followed for aortic root aneurysm as well.   She has called the office on 3/27/214 with LLQ abdomianla pin I offered CT scan and treated her with Flagyl. She has hx of diverticulosis. No fever chills or sweats. No rectal bleeding.      She has been to the ER on 4/1/24 for chest pain radiating to her back. She was put on Levsin for gas in the abdomen. CTA chst and abdomen pelvis no evidence for dissection, aorta dilated 4.4 cm. No additional acute intra abdominal abnormalities noted. The liver is unremarkable except for mild intrahepatic biliary  dilatation. The spleen is normal.     Today she continues to endorse the LUQ  "abdominal pain which continues. It is up under the rib cage. We have reviewed her extensive workup and previous evaluations as well. She says that the levsin did not make a difference. She is on Linzess for constipation. She does have gas. Long hx of IBS and we discussed this today a well. She says her bowels are moving regular. No bleeding. She says that Flagyl did not make a difference.      CTA IMPRESSION:  1. Mild atheromatous changes of the abdominal aorta. No aneurysmal  dilatation or dissection. The details are given above.  2. Hardware fusion of the lumbar spine at L4, L5 and S1 and laminectomy  at L4-5. No obvious hardware complication.  3. A 3 mm nonobstructing calculus right kidney.  This report was signed and finalized on 4/1/2024 11:12 AM by Dr. Satya Munoz MD.     Note from [facility] 3/30/23  Ms. Emily Pedro presents for c/o LUQ abdominal pain and diarrhea. She notes she started having symptoms about 2 weeks ago. She notes eating does make her abdominal pain worse. She notes she had stools checked through PCP's office and also was tested for pancreatitis and was told everything was good. She notes she has put her Linzess she normally takes for constipation on hold since she has been having the diarrhea. She notes her stools are yellow and oily and broken up into pieces with liquid in it as well. She denies any blood in her stools or black tarry stools. She notes her appetite is decreased significantly and she has been \"living on\" zofran for nausea. She notes she also has acid reflux despite taking Protonix.   5/1/23  armando Pedro presents for f/u scopes done for abdominal pain, diarrhea, nausea, and GERD. She had her scopes on 4/24/23 and was suppose to have 4 week f/u but she called the office with continued c/o so her appt was moved up. Colonoscopy was found to be normal and random colonic biopsies were benign c/w IBS-D. EGD found chronic gastritis, duodenitis, short " esophagus with reflux, and large hiatal hernia; hpylori urease was negative. She notes she has had IBS for a long time but the pain that she has had for the last 2 months is different from her usual pain. She notes her pain is in LUQ under ribs. She notes she is taking fiber but got the fiber gummies because she cannot tolerate Benefiber. She notes she is taking 2 fiber gummies a day. She notes she is still taking pantoprazole and has for several years now. She wonders if it needs to be changed. She notes she has always taken it when she wakes up in the mornings and eats/drinks protein 30 mins later.   Past Medical History:   Diagnosis Date    AAA (abdominal aortic aneurysm)     Abnormal ECG 03/14/2023    Allergic     Anemia     Angina pectoris     Anxiety     Arthritis     Asthma     Bicuspid aortic valve 03/14/2023    Cataract     Surgery 2022    Cholelithiasis     Chronic kidney disease     Clotting disorder     Seeing Leticia Flores at [facility] currently    Colon polyp     Coronary artery disease     Deep vein thrombosis     Dental disease     Diabetes mellitus     Diverticulitis of colon     Diverticulosis     GERD (gastroesophageal reflux disease)     Headache     Heart murmur     Hernia     HL (hearing loss)     Hypertension     Hyperthyroidism     Hypothyroid     Irritable bowel syndrome     Kidney stone     Previous haf lithotripsy    Low back pain     Lupus     Mitral valve prolapse     In the [facility] Dr. Claudio Gibson    NSTEMI (non-ST elevated myocardial infarction) 12/26/2023    Obesity     Osteopenia     Pancreatitis     Peptic ulcer disease     Peptic ulceration     Pneumonia     RBBB 03/14/2023    Scoliosis     Sinusitis     Sjogren's disease     Sleep apnea     Years ago, but no longer have since Gastric Sleeve Surgery    Tinnitus     Ulcerative colitis     Urinary tract infection     Visual impairment      Past Surgical History:   Procedure Laterality Date    ABDOMINAL AORTIC ANEURYSM  REPAIR  06/18/2024    ABDOMINAL SURGERY      ANKLE SURGERY      ASCENDING ARCH/HEMIARCH REPLACEMENT  06/18/2024    BACK SURGERY      BARIATRIC SURGERY      CARDIAC CATHETERIZATION N/A 12/26/2023    Procedure: Left Heart Cath;  Surgeon: Pablito De Leon DO;  Location:  [facility] CATH INVASIVE LOCATION;  Service: Cardiovascular;  Laterality: N/A;    CARDIAC CATHETERIZATION  12/26/2023    CARPAL TUNNEL RELEASE Bilateral     CHOLECYSTECTOMY      COLONOSCOPY  04/24/2023    normal colon consistent with ibs    ENDOSCOPY  04/24/2023    large hitial hernia,chronic gastritis    EYE SURGERY  2022    Cataracts both eyes    FLEXIBLE SIGMOIDOSCOPY      FOOT SURGERY      GALLBLADDER SURGERY      HYSTERECTOMY      PACEMAKER IMPLANTATION  06/2024    SHOULDER SURGERY      SUBTOTAL HYSTERECTOMY      TUBAL ABDOMINAL LIGATION      UPPER GASTROINTESTINAL ENDOSCOPY      VEIN SURGERY      [facility]       Outpatient Medications Marked as Taking for the 7/22/24 encounter (Office Visit) with Jennifer Rosa APRN   Medication Sig Dispense Refill    acetaminophen (TYLENOL) 500 MG tablet Take 1 tablet by mouth Every 4 (Four) Hours As Needed.      aspirin 81 MG chewable tablet Chew 1 tablet Daily.      atorvastatin (LIPITOR) 40 MG tablet Take 1 tablet by mouth Daily.      Continuous Glucose  (FreeStyle Aydin 2 Jacobson) device Use 1 each Continuous.      Continuous Glucose Sensor (FreeStyle Aydin 2 Sensor) misc Use 1 each Every 14 (Fourteen) Days.      eszopiclone (LUNESTA) 3 MG tablet Take 1 tablet by mouth Every Night. Take immediately before bedtime 90 tablet 0    fluticasone (FLONASE) 50 MCG/ACT nasal spray 2 sprays into the nostril(s) as directed by provider Daily. 16 g 1    furosemide (LASIX) 40 MG tablet Take 1 tablet by mouth Daily. 30 tablet 2    Galcanezumab-gnlm (Emgality) 120 MG/ML solution prefilled syringe Inject 1 mL under the skin into the appropriate area as directed Every 30 (Thirty) Days.  1 mL 5    hydrOXYzine (ATARAX) 10 MG tablet Take 1 tablet by mouth 3 (Three) Times a Day As Needed for Itching.      lansoprazole (PREVACID) 30 MG capsule Take 1 capsule by mouth Daily. 90 capsule 3    leflunomide (ARAVA) 10 MG tablet Take 1 tablet by mouth Every Other Day.      levothyroxine (Synthroid) 75 MCG tablet Take 1/2 tablet on Wednesdays. Skip Sunday dose. Take one tablet daily Bajzhk-Tgbqwqw-Mqkpwvmp-Friday-Saturday. 30 tablet 11    lidocaine (LIDODERM) 5 % Place 1 patch on the skin as directed by provider Daily. Remove & Discard patch within 12 hours or as directed by MD 14 each 1    linaclotide (LINZESS) 290 MCG capsule capsule Take 1 capsule by mouth Daily. 90 capsule 1    magnesium oxide (MAG-OX) 400 MG tablet Take 1 tablet by mouth Daily. 90 tablet 1    metaxalone (SKELAXIN) 800 MG tablet Take 1 tablet by mouth 3 (Three) Times a Day As Needed.      Narcan 4 MG/0.1ML nasal spray 1 spray into the nostril(s) as directed by provider As Needed.      nitroglycerin (NITROSTAT) 0.4 MG SL tablet Place 1 tablet under the tongue Every 5 (Five) Minutes As Needed for Chest Pain. Take no more than 3 doses in 15 minutes. 100 tablet 2    ondansetron ODT (ZOFRAN-ODT) 4 MG disintegrating tablet Place 1 tablet on the tongue Every 8 (Eight) Hours As Needed for Nausea or Vomiting. 30 tablet 5    PARoxetine (Paxil) 10 MG tablet Take 1 tablet by mouth Every Morning. 30 tablet 1    pilocarpine (SALAGEN) 5 MG tablet Take 1 tablet by mouth 3 (Three) Times a Day.      potassium chloride (K-DUR,KLOR-CON) 10 MEQ CR tablet Take 1 tablet by mouth 2 (Two) Times a Day.      potassium chloride 10 MEQ CR tablet Take 1 tablet by mouth 2 (Two) Times a Day With Meals.      traMADol (ULTRAM) 50 MG tablet Take 1 tablet by mouth.      ubrogepant (UBRELVY) 100 MG tablet Take 1 tablet at the onset of headache. May repeat dose x1 in 2 hours if headache is not resolved. Max dose 200 mg/24 hours. 30 tablet 2       Allergies   Allergen Reactions  "   Clavulanic Acid Nausea And Vomiting    Gadolinium Other (See Comments)     \"passed out and was shaking all over. Had to spend the night at hospital\"  NAUSEA/POS SYNCOPE  \"passed out and was shaking all over. Had to spend the night at hospital\"  NAUSEA/POS SYNCOPE  \"passed out and was shaking all over. Had to spend the night at hospital\"  \"passed out and was shaking all over. Had to spend the night at hospital\"  NAUSEA/POS SYNCOPE  NAUSEA/POS SYNCOPE      Verapamil Hcl Er Anaphylaxis    Zoster Vac Recomb Adjuvanted Swelling    Flagyl [Metronidazole] Diarrhea     Abdominal pain     Estradiol Rash    Hydroxychloroquine GI Intolerance     Abdominal pain     Metformin Unknown - Low Severity    Prednisone Other (See Comments)     Increase in BP    Protonix [Pantoprazole] Nausea Only and Myalgia    Tetracyclines & Related Rash    Trazodone Itching, Other (See Comments) and Unknown (See Comments)     Blurred vision  Blurred vision  Blurred vision  Blurred vision         Social History     Socioeconomic History    Marital status:    Tobacco Use    Smoking status: Former     Current packs/day: 0.25     Average packs/day: 0.3 packs/day for 59.2 years (14.8 ttl pk-yrs)     Types: Cigarettes     Start date: 1/1/1991     Quit date: 1/1/1987     Passive exposure: Past    Smokeless tobacco: Never    Tobacco comments:     [id number] for 6 months only   Vaping Use    Vaping status: Never Used   Substance and Sexual Activity    Alcohol use: Not Currently     Comment: Socially very rare    Drug use: Never    Sexual activity: Yes     Partners: Male     Birth control/protection: None, Same-sex partner     Comment: Thats one male patner i live with       Family History   Problem Relation Age of Onset    Breast cancer Mother 70    Diabetes Mother     Hearing loss Mother     Heart disease Mother     Hyperlipidemia Mother     Vision loss Mother     Clotting disorder Mother     Fainting Mother     Hypertension Mother     Cancer " Mother     Migraines Mother     Inflammatory bowel disease Mother     Scleroderma Father     Diabetes Father     Hearing loss Father     Heart disease Father     Hyperlipidemia Father     Vision loss Father     Clotting disorder Father     Fainting Father     Heart attack Father     Hypertension Father     Cancer Father     Stroke Father     Heart valve disorder Father     Colon polyps Father     Inflammatory bowel disease Father     Ulcerative colitis Father     Diabetes Sister     Hyperlipidemia Sister     Vision loss Sister     Asthma Sister     Hypertension Sister     Thyroid disease Sister     Hyperlipidemia Brother     Vision loss Brother     Asthma Brother     Hypertension Brother     Diabetes Brother     Colon cancer Neg Hx        Review of Systems   Constitutional:  Negative for activity change, appetite change, chills, diaphoresis, fatigue, fever and unexpected weight change.   HENT:  Negative for ear pain, hearing loss, mouth sores, sore throat, trouble swallowing and voice change.    Eyes: Negative.    Respiratory:  Negative for cough, choking, shortness of breath and wheezing.    Cardiovascular:  Negative for chest pain and palpitations.   Gastrointestinal:  Positive for constipation. Negative for abdominal pain, blood in stool, diarrhea, nausea and vomiting.   Endocrine: Negative for cold intolerance and heat intolerance.   Genitourinary:  Negative for decreased urine volume, dysuria, frequency, hematuria and urgency.   Musculoskeletal:  Negative for back pain, gait problem and myalgias.   Skin:  Negative for color change, pallor and rash.   Allergic/Immunologic: Negative for food allergies and immunocompromised state.   Neurological:  Negative for dizziness, tremors, seizures, syncope, weakness, light-headedness, numbness and headaches.   Hematological:  Negative for adenopathy. Does not bruise/bleed easily.   Psychiatric/Behavioral:  Negative for agitation and confusion. The patient is not  "nervous/anxious.    All other systems reviewed and are negative.      /74 (BP Location: Right arm)   Pulse 96   Temp 97.1 °F (36.2 °C) (Temporal)   Ht 167.6 cm (65.98\")   Wt 74 kg (163 lb 3.2 oz)   SpO2 98%   Breastfeeding No   BMI 26.35 kg/m²   Body mass index is 26.35 kg/m².    Physical Exam  Constitutional:       Appearance: She is well-developed.   HENT:      Head: Normocephalic and atraumatic.   Eyes:      Pupils: Pupils are equal, round, and reactive to light.   Neck:      Trachea: No tracheal deviation.   Cardiovascular:      Rate and Rhythm: Normal rate and regular rhythm.      Heart sounds: Normal heart sounds. No murmur heard.     No friction rub. No gallop.   Pulmonary:      Effort: Pulmonary effort is normal. No respiratory distress.      Breath sounds: Normal breath sounds. No wheezing or rales.   Chest:      Chest wall: No tenderness.   Abdominal:      General: Bowel sounds are normal. There is no distension.      Palpations: Abdomen is soft. Abdomen is not rigid.      Tenderness: There is no abdominal tenderness. There is no guarding or rebound.   Musculoskeletal:         General: No tenderness or deformity. Normal range of motion.      Cervical back: Normal range of motion and neck supple.   Skin:     General: Skin is warm and dry.      Coloration: Skin is not pale.      Findings: No rash.   Neurological:      Mental Status: She is alert and oriented to person, place, and time.      Deep Tendon Reflexes: Reflexes are normal and symmetric.   Psychiatric:         Behavior: Behavior normal.         Thought Content: Thought content normal.         Judgment: Judgment normal.         ASSESSMENT AND PLAN         Diagnoses and all orders for this visit:    1. Slow transit constipation (Primary)  Comments:  continue curren treatment regimen    2. Elevated alkaline phosphatase level  Comments:  last 2 checks normal.    3. Nonsmoker      Last colonoscopy in 2023.   To call us as needed  ALKP is " normal no need for further workup at this time.   Recovering from her heart procedures well. To keep follow up with other specialists and PCP.     There are no Patient Instructions on file for this visit.  Jennifer New Moe, LAQUITA  15:42 CDT  7/22/2024    Obesity, Adult  Obesity is the condition of having too much total body fat. Being overweight or obese means that your weight is greater than what is considered healthy for your body size. Obesity is determined by a measurement called BMI. BMI is an estimate of body fat and is calculated from height and weight. For adults, a BMI of 30 or higher is considered obese.  Obesity can eventually lead to other health concerns and major illnesses, including:  Stroke.  Coronary artery disease (CAD).  Type 2 diabetes.  Some types of cancer, including cancers of the colon, breast, uterus, and gallbladder.  Osteoarthritis.  High blood pressure (hypertension).  High cholesterol.  Sleep apnea.  Gallbladder stones.  Infertility problems.  What are the causes?  The main cause of obesity is taking in (consuming) more calories than your body uses for energy. Other factors that contribute to this condition may include:  Being born with genes that make you more likely to become obese.  Having a medical condition that causes obesity. These conditions include:  Hypothyroidism.  Polycystic ovarian syndrome (PCOS).  Binge-eating disorder.  Cushing syndrome.  Taking certain medicines, such as steroids, antidepressants, and seizure medicines.  Not being physically active (sedentary lifestyle).  Living where there are limited places to exercise safely or buy healthy foods.  Not getting enough sleep.  What increases the risk?  The following factors may increase your risk of this condition:  Having a family history of obesity.  Being a woman of -American descent.  Being a man of  descent.  What are the signs or symptoms?  Having excessive body fat is the main symptom of this  condition.  How is this diagnosed?  This condition may be diagnosed based on:  Your symptoms.  Your medical history.  A physical exam. Your health care provider may measure:  Your BMI. If you are an adult with a BMI between 25 and less than 30, you are considered overweight. If you are an adult with a BMI of 30 or higher, you are considered obese.  The distances around your hips and your waist (circumferences). These may be compared to each other to help diagnose your condition.  Your skinfold thickness. Your health care provider may gently pinch a fold of your skin and measure it.  How is this treated?  Treatment for this condition often includes changing your lifestyle. Treatment may include some or all of the following:  Dietary changes. Work with your health care provider and a dietitian to set a weight-loss goal that is healthy and reasonable for you. Dietary changes may include eating:  Smaller portions. A portion size is the amount of a particular food that is healthy for you to eat at one time. This varies from person to person.  Low-calorie or low-fat options.  More whole grains, fruits, and vegetables.  Regular physical activity. This may include aerobic activity (cardio) and strength training.  Medicine to help you lose weight. Your health care provider may prescribe medicine if you are unable to lose 1 pound a week after 6 weeks of eating more healthily and doing more physical activity.  Surgery. Surgical options may include gastric banding and gastric bypass. Surgery may be done if:  Other treatments have not helped to improve your condition.  You have a BMI of 40 or higher.  You have life-threatening health problems related to obesity.  Follow these instructions at home:     Eating and drinking     Follow recommendations from your health care provider about what you eat and drink. Your health care provider may advise you to:  Limit fast foods, sweets, and processed snack foods.  Choose low-fat options,  such as low-fat milk instead of whole milk.  Eat 5 or more servings of fruits or vegetables every day.  Eat at home more often. This gives you more control over what you eat.  Choose healthy foods when you eat out.  Learn what a healthy portion size is.  Keep low-fat snacks on hand.  Avoid sugary drinks, such as soda, fruit juice, iced tea sweetened with sugar, and flavored milk.  Eat a healthy breakfast.  Drink enough water to keep your urine clear or pale yellow.  Do not go without eating for long periods of time (do not fast) or follow a fad diet. Fasting and fad diets can be unhealthy and even dangerous.  Physical Activity   Exercise regularly, as told by your health care provider. Ask your health care provider what types of exercise are safe for you and how often you should exercise.  Warm up and stretch before being active.  Cool down and stretch after being active.  Rest between periods of activity.  Lifestyle   Limit the time that you spend in front of your TV, computer, or video game system.  Find ways to reward yourself that do not involve food.  Limit alcohol intake to no more than 1 drink a day for nonpregnant women and 2 drinks a day for men. One drink equals 12 oz of beer, 5 oz of wine, or 1½ oz of hard liquor.  General instructions   Keep a weight loss journal to keep track of the food you eat and how much you exercise you get.  Take over-the-counter and prescription medicines only as told by your health care provider.  Take vitamins and supplements only as told by your health care provider.  Consider joining a support group. Your health care provider may be able to recommend a support group.  Keep all follow-up visits as told by your health care provider. This is important.  Contact a health care provider if:  You are unable to meet your weight loss goal after 6 weeks of dietary and lifestyle changes.  This information is not intended to replace advice given to you by your health care provider. Make  sure you discuss any questions you have with your health care provider.  Document Released: 01/25/2006 Document Revised: 05/22/2017 Document Reviewed: 10/05/2016  [employer] Interactive Patient Education © 2017 [employer]      IF YOU SMOKE OR USE TOBACCO PLEASE READ THE FOLLOWING:    Why is smoking bad for me?  Smoking increases the risk of heart disease, lung disease, vascular disease, stroke, and cancer.     If you smoke, STOP!    If you would like more information on quitting smoking, please visit the [employer] website: www.Pressgram/corporate/healthier-together/smoke   This link will provide additional resources including the QUIT line and the [employer] support groups.     For more information:    Quit Now [place]  1-800-QUIT-NOW  https://[email or web address].quitlogix.org/en-US/

## 2024-07-22 NOTE — TELEPHONE ENCOUNTER
This is on her medication list, as a historical entry, but we haven't refilled before . Please call pt and ask how often she takes this muscle relaxer, then we can forward to Dr. Tidwell for review.

## 2024-07-22 NOTE — PROGRESS NOTES
"    [facility] - PODIATRY    Today's Date: 07/25/2024     Patient Name: Kelli Pedro  MRN: [id number]  CSN: [id number]  PCP: ELLEN Tidwell DO  Referring Provider: ELLEN Tidwell,*    SUBJECTIVE     Chief Complaint   Patient presents with    Establish Care     Kelli Pedro 07/03/2024 Toenail deformity- pt states left great nail poss ingrown, going on off and on for couple years- pt denies pain     Diabetes     97mg/dl BG at present     HPI: Kelli Pedro, a 58 y.o.female, comes to clinic as a(n) new patient complaining of toenail/callus issues. Patient has h/o AAA,  Anemia, Anxiety, Arthritis, CKD, Blood Clotting Disorder, CAD, DVT, GERD, HL, HTN, Hyperthyroid, IBS, Low back pain, Lupus, Osteopenia, PUD, MVP, Sjogrens Disease, Pre-diabetes .  Patient states she is \"Hyper-Diabetic\" and currently wears a continuous monitor. Last BG level 97 mg/dl this am. Today patient presents with complaints of irregular bilateral greater toenails. Notes they have had several damage to them over the years- most notably a roque lift falling and smashing her greater toes. States she has had them lasered in the past and would like to have them done again today as they are starting to grow side ways. Denies numbness and tingling in feet. Denies open wounds or sores today . Denies pain. Relates previous treatment(s) including laser treatments . Denies any constitutional symptoms. No other pedal complaints at this time.    Past Medical History:   Diagnosis Date    AAA (abdominal aortic aneurysm)     Abnormal ECG 03/14/2023    Allergic     Anemia     Angina pectoris     Anxiety     Anxiety and depression 6/25/2024    Arthritis     Asthma     Bicuspid aortic valve 03/14/2023    Cataract     Surgery 2022    Cholelithiasis     Chronic kidney disease     Clotting disorder     Seeing Leticia Flores at [facility] currently    Colon polyp     Coronary artery disease     Deep vein thrombosis     Dental " disease     Diabetes mellitus     Diverticulitis of colon     Diverticulosis     GERD (gastroesophageal reflux disease)     Headache     Heart murmur     Hernia     HL (hearing loss)     Hypertension     Hyperthyroidism     Hypothyroid     Irritable bowel syndrome     Kidney stone     Previous haf lithotripsy    Low back pain     Lupus     Mitral valve prolapse     In the [facility] Dr. Claudio iGbson    NSTEMI (non-ST elevated myocardial infarction) 12/26/2023    Obesity     Osteopenia     Pancreatitis     Peptic ulcer disease     Peptic ulceration     Pneumonia     RBBB 03/14/2023    Scoliosis     Sinusitis     Sjogren's disease     Sleep apnea     Years ago, but no longer have since Gastric Sleeve Surgery    Tinnitus     Ulcerative colitis     Urinary tract infection     Visual impairment      Past Surgical History:   Procedure Laterality Date    ABDOMINAL AORTIC ANEURYSM REPAIR  06/18/2024    ABDOMINAL SURGERY      ANKLE SURGERY      ASCENDING ARCH/HEMIARCH REPLACEMENT  06/18/2024    BACK SURGERY      BARIATRIC SURGERY      CARDIAC CATHETERIZATION N/A 12/26/2023    Procedure: Left Heart Cath;  Surgeon: Pablito De Leon DO;  Location:  [facility] CATH IN[facility]ASIVE LOC[facility];  Service: Cardiovascular;  Laterality: N/A;    CARDIAC CATHETERIZATION  12/26/2023    CARPAL TUNNEL RELEASE Bilateral     CHOLECYSTECTOMY      COLONOSCOPY  04/24/2023    normal colon consistent with ibs    ENDOSCOPY  04/24/2023    large hitial hernia,chronic gastritis    EYE SURGERY  2022    Cataracts both eyes    FLEXIBLE SIGMOIDOSCOPY      FOOT SURGERY      GALLBLADDER SURGERY      HYSTERECTOMY      PACEMAKER IMPLANTATION  06/2024    SHOULDER SURGERY      SUBTOTAL HYSTERECTOMY      TUBAL ABDOMINAL LIGATION      UPPER GASTROINTESTINAL ENDOSCOPY      VEIN SURGERY      [facility]     Family History   Problem Relation Age of Onset    Breast cancer Mother 70    Diabetes Mother     Hearing loss Mother     Heart  "disease Mother     Hyperlipidemia Mother     Vision loss Mother     Clotting disorder Mother     Fainting Mother     Hypertension Mother     Cancer Mother     Migraines Mother     Inflammatory bowel disease Mother     Scleroderma Father     Diabetes Father     Hearing loss Father     Heart disease Father     Hyperlipidemia Father     Vision loss Father     Clotting disorder Father     Fainting Father     Heart attack Father     Hypertension Father     Cancer Father     Stroke Father     Heart valve disorder Father     Colon polyps Father     Inflammatory bowel disease Father     Ulcerative colitis Father     Diabetes Sister     Hyperlipidemia Sister     Vision loss Sister     Asthma Sister     Hypertension Sister     Thyroid disease Sister     Hyperlipidemia Brother     Vision loss Brother     Asthma Brother     Hypertension Brother     Diabetes Brother     Colon cancer Neg Hx      Social History     Socioeconomic History    Marital status:    Tobacco Use    Smoking status: Former     Current packs/day: 0.25     Average packs/day: 0.3 packs/day for 59.2 years (14.8 ttl pk-yrs)     Types: Cigarettes     Start date: 1/1/1991     Quit date: 1/1/1987     Passive exposure: Past    Smokeless tobacco: Never    Tobacco comments:     [id number] for 6 months only   Vaping Use    Vaping status: Never Used   Substance and Sexual Activity    Alcohol use: Not Currently     Comment: Socially very rare    Drug use: Never    Sexual activity: Yes     Partners: Male     Birth control/protection: None, Same-sex partner     Comment: Thats one male patner i live with     Allergies   Allergen Reactions    Clavulanic Acid Nausea And Vomiting    Gadolinium Other (See Comments)     \"passed out and was shaking all over. Had to spend the night at hospital\"  NAUSEA/POS SYNCOPE  \"passed out and was shaking all over. Had to spend the night at hospital\"  NAUSEA/POS SYNCOPE  \"passed out and was shaking all over. Had to spend the night at " "hospital\"  \"passed out and was shaking all over. Had to spend the night at hospital\"  NAUSEA/POS SYNCOPE  NAUSEA/POS SYNCOPE      Verapamil Hcl Er Anaphylaxis    Zoster Vac Recomb Adjuvanted Swelling    Flagyl [Metronidazole] Diarrhea     Abdominal pain     Estradiol Rash    Hydroxychloroquine GI Intolerance     Abdominal pain     Metformin Unknown - Low Severity    Prednisone Other (See Comments)     Increase in BP    Protonix [Pantoprazole] Nausea Only and Myalgia    Tetracyclines & Related Rash    Trazodone Itching, Other (See Comments) and Unknown (See Comments)     Blurred vision  Blurred vision  Blurred vision  Blurred vision       Current Outpatient Medications   Medication Sig Dispense Refill    acetaminophen (TYLENOL) 500 MG tablet Take 1 tablet by mouth Every 4 (Four) Hours As Needed.      aspirin 81 MG chewable tablet Chew 1 tablet Daily.      atorvastatin (LIPITOR) 40 MG tablet Take 1 tablet by mouth Daily.      Continuous Glucose  (FreeStyle Aydin 2 [place]) device Use 1 each Continuous.      Continuous Glucose Sensor (FreeStyle Aydin 2 Sensor) misc Use 1 each Every 14 (Fourteen) Days.      eszopiclone (LUNESTA) 3 MG tablet Take 1 tablet by mouth Every Night. Take immediately before bedtime 90 tablet 0    fluticasone (FLONASE) 50 MCG/ACT nasal spray 2 sprays into the nostril(s) as directed by provider Daily. 16 g 1    furosemide (LASIX) 40 MG tablet Take 1 tablet by mouth Daily. 30 tablet 2    Galcanezumab-gnlm (Emgality) 120 MG/ML solution prefilled syringe Inject 1 mL under the skin into the appropriate area as directed Every 30 (Thirty) Days. 1 mL 5    hydrOXYzine (ATARAX) 10 MG tablet Take 1 tablet by mouth 3 (Three) Times a Day As Needed for Itching.      lansoprazole (PREVACID) 30 MG capsule Take 1 capsule by mouth Daily. 90 capsule 3    leflunomide (ARAVA) 10 MG tablet Take 1 tablet by mouth Every Other Day.      levothyroxine (Synthroid) 75 MCG tablet Take 1/2 tablet on Wednesdays. Skip " Sunday dose. Take one tablet daily Bppjqi-Nswwuqk-Ooblmwsx-Friday-Saturday. 30 tablet 11    lidocaine (LIDODERM) 5 % Place 1 patch on the skin as directed by provider Daily. Remove & Discard patch within 12 hours or as directed by MD 14 each 1    linaclotide (LINZESS) 290 MCG capsule capsule Take 1 capsule by mouth Daily. 90 capsule 1    magnesium oxide (MAG-OX) 400 MG tablet Take 1 tablet by mouth Daily. 90 tablet 1    metaxalone (SKELAXIN) 800 MG tablet Take 1 tablet by mouth 3 (Three) Times a Day As Needed.      Narcan 4 MG/0.1ML nasal spray 1 spray into the nostril(s) as directed by provider As Needed.      nitroglycerin (NITROSTAT) 0.4 MG SL tablet Place 1 tablet under the tongue Every 5 (Five) Minutes As Needed for Chest Pain. Take no more than 3 doses in 15 minutes. 100 tablet 2    ondansetron ODT (ZOFRAN-ODT) 4 MG disintegrating tablet Place 1 tablet on the tongue Every 8 (Eight) Hours As Needed for Nausea or Vomiting. 30 tablet 5    PARoxetine (Paxil) 10 MG tablet Take 1 tablet by mouth Every Morning. 30 tablet 1    pilocarpine (SALAGEN) 5 MG tablet Take 1 tablet by mouth 3 (Three) Times a Day.      potassium chloride (K-DUR,KLOR-CON) 10 MEQ CR tablet Take 1 tablet by mouth 2 (Two) Times a Day.      potassium chloride 10 MEQ CR tablet Take 1 tablet by mouth 2 (Two) Times a Day With Meals.      traMADol (ULTRAM) 50 MG tablet Take 1 tablet by mouth.      ubrogepant (UBRELVY) 100 MG tablet Take 1 tablet at the onset of headache. May repeat dose x1 in 2 hours if headache is not resolved. Max dose 200 mg/24 hours. 30 tablet 2     No current facility-administered medications for this visit.     Review of Systems   Constitutional:  Negative for chills and fever.   HENT:  Negative for congestion.    Respiratory:  Negative for shortness of breath.    Cardiovascular:  Negative for chest pain and leg swelling.   Gastrointestinal:  Negative for constipation, diarrhea, nausea and vomiting.   Musculoskeletal: Negative.   Negative for arthralgias.   Skin:  Negative for wound.        Bilateral irregular greater toenails   Psychiatric/Behavioral:  Negative for agitation and behavioral problems.        OBJECTIVE     Vitals:    07/25/24 0814   BP: 118/64   Pulse: 68   SpO2: 98%       PHYSICAL EXAM  GEN:   Accompanied by none.     Foot/Ankle Exam    GENERAL  Diabetic foot exam performed    Appearance:  appears stated age  Orientation:  AAOx3  Affect:  appropriate  Gait:  unimpaired  Assistance:  independent  Right shoe gear: casual shoe  Left shoe gear: casual shoe    VASCULAR     Right Foot Vascularity   Dorsalis pedis:  2+  Posterior tibial:  2+  Skin temperature:  warm  Edema grading:  None  CFT:  3  Pedal hair growth:  Present  Varicosities:  none     Left Foot Vascularity   Dorsalis pedis:  2+  Posterior tibial:  2+  Skin temperature:  warm  Edema grading:  None  CFT:  3  Pedal hair growth:  Present  Varicosities:  none     NEUROLOGIC     Right Foot Neurologic   Normal sensation    Light touch sensation: normal  Vibratory sensation: normal  Hot/Cold sensation: normal  Protective Sensation using [place] Monofilament:   Sites intact: 10  Sites tested: 10     Left Foot Neurologic   Normal sensation    Light touch sensation: normal  Vibratory sensation: normal  Hot/Cold sensation:  normal  Protective Sensation using [place] Monofilament:   Sites intact: 10  Sites tested: 10    MUSCULOSKELETAL     Right Foot Musculoskeletal   Ecchymosis:  none  Tenderness:  none    Arch:  Normal     Left Foot Musculoskeletal   Ecchymosis:  none  Tenderness:  none  Arch:  Normal    MUSCLE STRENGTH     Right Foot Muscle Strength   Foot dorsiflexion:  5  Foot plantar flexion:  5  Foot inversion:  5  Foot eversion:  5     Left Foot Muscle Strength   Foot dorsiflexion:  5  Foot plantar flexion:  5  Foot inversion:  5  Foot eversion:  5    RANGE OF MOTION     Right Foot Range of Motion   Foot and ankle ROM within normal limits       Left Foot  Range of Motion   Foot and ankle ROM within normal limits      DERMATOLOGIC      Right Foot Dermatologic   Skin  Right foot skin is intact.   Nails  1.  Positive for onychomycosis, abnormal thickness, subungual debris and dystrophic nail.  2.  Positive for onychomycosis, abnormal thickness, subungual debris and dystrophic nail.  3.  Normal.  4.  Normal.  5.  Normal.     Left Foot Dermatologic   Skin  Left foot skin is intact.   Nails  1.  Positive for onychomycosis, abnormal thickness, subungual debris and dystrophic nail.  2.  Positive for onychomycosis, abnormal thickness, subungual debris and dystrophic nail.  3.  Normal.  4.  Normal.  5.  Normal.      RADIOLOGY/NUCLEAR:  XR Chest 2 View    Result Date: 6/27/2024  Narrative: This result has an attachment that is not available. EXAMINATION: 2 view chest radiograph    Impression: The current study is compared with the prior radiograph dated  06/26/2024. Postsurgical changes of median sternotomy and aortic valve replacement are again seen.  Left subclavian approach pacer device is in place with leads overlying the right atrium and right ventricle. Surgical clips overlie the left upper quadrant. Partial left lower lobar collapse and atelectasis in the left lung base, increased from comparison.  Small left-sided pleural effusion. No right-sided pleural effusion.  No pneumothorax or pulmonary edema.  The cardiomediastinal contours are unchanged. Dictated by: Gaby Yi M.D. The radiology attending physician has personally reviewed this study, and had reviewed and/or edited this written report and agrees with it. Electronically signed by: Rashid Garza M.D.    XR Chest 1 View    Result Date: 6/26/2024  Narrative: This result has an attachment that is not available. EXAMINATION: 1 view chest radiograph    Impression: The current study is compared with the chest radiograph dated  06/25/2024 at 9:51 AM. Patient is status post median sternotomy with aortic valve  replacement.  Temporary epicardial pacing wires are noted.  A left subclavian approach pacemaker with right atrial and right ventricular leads is in place. There are small lung volumes with mild left basilar atelectasis which is unchanged.  There is unchanged trace left pleural effusion.  No right pleural effusion.  No pneumothorax.  The cardiomediastinal silhouette is stable. Dictated by: Jaswinder Diggs M.D. The radiology attending physician has personally reviewed this study, and had reviewed and/or edited this written report and agrees with it. Electronically signed by: Fabricio Laws M.D.    Implantable Cardiac Device    Result Date: 6/26/2024  Narrative: This result has an attachment that is not available. Patient Name: Kelli Pedro YOB: 1965   Medical Record Number: [id number] Procedure Date: 6/26/2024 Name of procedure: 1. Placement of a dual-chamber pacemaker 2. Subclavian venography History: Ms. Pedro is a 58 year old woman with bicuspid aortic valve c/b aortic stenosis, ascending aortic aneurysm, and myocardial bridging of the LAD who underwent LAD coronary unroofing, aortic valve replacement (bioAVR), and ascending aortic aneurysm repair and transverse aortic arch hemiarch repair with Dacron graft on 6/18 c/b mediastinal hematoma s/p re-exploration on 6/18 and repair of RA retrograde site with Dr. Wolf. Post op course is c/b CHB requiring epi wire pacing support. Her ejection fraction remained preserved. Dual chamber pacemaker was recommended.  Risks, benefits and alternatives were discussed with patient and her  who agreed that this was the best way to proceed. Methods: After informed consent was obtained, the patient was brought to the EP laboratory in a postabsorptive, nonsedated state.  Peripheral IV access was established.  Prophylactic antibiotics were administered prior to incision.  Continuous ECG, blood pressure, and pulse oximetry  were initiated.  Cardioversion patch electrodes were placed on the patient's chest and back.  A grounding patch was applied to the skin.  Sedation was administered via conscious sedation using fentanyl and versed. In order to define the extrathoracic portion of the subclavian vein and exclude significant venous obstruction or anomalous anatomy, subclavian venography was performed prior to the procedure.  Using the patient's left peripheral IV, contrast was injected and images were recorded.  The left subclavian vein and SVC were found to be widely patent. The left chest was prepared and draped in a sterile fashion.  Local anesthesia was injected in the subcutaneous tissue in the infraclavicular area.  An incision was made medial to the deltopectoral groove.  The subcutaneous tissue was dissected the level of the prepectoral fascia.  A subcutaneous pocket was created.  Under fluoroscopic guidance and with the assistance of the images from the venogram, 2 separate venipunctures were made using micropuncture and modified Seldinger technique.  These were performed in the extrathoracic portion of the subclavian vein.  Guidewires were passed and two peel-away sheaths were placed, and used to advance leads into the circulation. Using fluoroscopic guidance, the leads were positioned.  The RV lead was advanced to the RV/outflow tract.  Ventricular ectopy was recorded.   Images were taken in HEAD and Syriac views to ensure appropriate lead placement.  The lead tip was subsequently positioned on the apical septum.  Adequate sensing and pacing parameters were found, and no diaphragmatic stimulation was seen with high-output pacing. Next, the right atrial lead was positioned in the right atrial appendage.   Adequate sensing and pacing parameters were found, and no diaphragmatic stimulation was seen with high-output pacing.  Both sheaths were split, and the leads were secured to the fascia with Ethibond ties. The pocket was flushed  with antibiotic solution and hemostasis was assured.  The generator was connected to the leads and placed inside the pocket.  Antimicrobial envelope was used.  The wound was closed with 3 running layers of absorbable suture, and steripstrips were applied Following the procedure, the patient was taken to the recovery area in stable condition.  A chest x-ray was obtained in the holding area. Sedation start: 10:28am Sedation finish: 12:43pm 125 mcg fentanyl 3.5 mg midazolam Lead parameters and device programming: - RA Lead Medtronic 5076-45 (SN #[id number]): Sensing 1.25 mV, Pacing threshold 1.0 V at 0.4 ms, Imp 741 Ohm - RV Lead Medtronic 5076-52 (SN #[id number]): Sensing 9.75 mV(paced), Pacing threshold 0.6 V at 0.4 ms, Imp 874 Ohm - Device: Medtronic [place] S [id number] pacemaker (SN #[id number]), programmed DDD 60-130bpm Conclusions: 1. Successful placement of a dual-chamber pacemaker 2. Subclavian venography Recommendations: 1. Return to previous bed 2. Portable chest x-ray in recovery area.  PA/lateral chest x-ray in the morning. 3. IV antibiotics while the patient is admitted. 4. Device interrogation in the morning. 5. Follow-up will be arranged in the [facility] 7-10 days post-discharge Mralo Gilmore MD Clinical Cardiac Electrophysiologist    XR Hip Right 2 or 3 Views    Result Date: 6/25/2024  Narrative: This result has an attachment that is not available. EXAMINATION: XR HIP RIGHT 2 OR 3 VIEWS HISTORY:  Ground level fall. FINDINGS: 2 exposures of the right hip with no available comparison. No acute fracture or dislocation.  Mild hip osteoarthritis. Partially imaged fusion instrumentation.    Impression: 1.  No acute osseous abnormality. Dictated by: Godwin Klein MD PHD The radiology attending physician has personally reviewed this study, and had reviewed and/or edited this written report and agrees with it. Electronically signed by: Manuel David MD    XR Chest 2 View    Result Date: 6/25/2024  Narrative:  This result has an attachment that is not available. EXAMINATION: 2 view chest radiograph    Impression: The current study is compared with the prior radiograph dated  06/21/2024. Median sternotomy wires.  Aortic valve replacement.  Cholecystectomy clips. Grossly unchanged left greater than right small volume pleural effusions with associated passive atelectasis, mild.  No pneumothorax or pneumonia.  Heart size is not changed. Dictated by: Marques Valdes M.D. The radiology attending physician has personally reviewed this study, and had reviewed and/or edited this written report and agrees with it. Electronically signed by: Kaila Tomlinson M.D.     LABORATORY/CULTURE RESULTS:      PATHOLOGY RESULTS:       ASSESSMENT/PLAN     Diagnoses and all orders for this visit:    1. Onychomycosis (Primary)    2. Dystrophic nail      Comprehensive lower extremity examination and evaluation was performed.  Discussed findings and treatment plan including risks, benefits, and treatment options with patient in detail. Patient agreed with treatment plan.  Patient may maintain nails and calluses at home utilizing emery board or pumice stone between visits as needed  Reviewed at home diabetic foot care including daily foot checks  Discussed with patient that we do not have laser treatment available here.  Provided patient with several different treatment options moving forward including conservative therapy with managing nails and keeping them trimmed back to shortest point of attachment and using dremel tool to rid nail of thickness and to provide shape.  Also so discussed TNA with matrixectomy if nails become problematic causing infection or pain in the future.  Patient prefers to remain conservative at this point in time.  Patient to return as needed or if any new issues or problems arise.    An After Visit Summary was printed and given to the patient at discharge, including (if requested) any available informative/educational handouts  regarding diagnosis, treatment, or medications. All questions were answered to patient/family satisfaction. Should symptoms fail to improve or worsen they agree to call or return to clinic or to go to the Emergency Department. Discussed the importance of following up with any needed screening tests/labs/specialist appointments and any requested follow-up recommended by me today. Importance of maintaining follow-up discussed and patient accepts that missed appointments can delay diagnosis and potentially lead to worsening of conditions.  Return in about 1 year (around 7/25/2025) for Follow-up with LAQUITA, Follow-up in [facility] or sooner if acute issues arise.      This document has been electronically signed by LAQUITA Ramon on July 25, 2024 09:12 CDT

## 2024-07-23 RX ORDER — FUROSEMIDE 40 MG/1
40 TABLET ORAL DAILY
Qty: 30 TABLET | Refills: 2 | OUTPATIENT
Start: 2024-07-23

## 2024-07-23 RX ORDER — FUROSEMIDE 40 MG/1
40 TABLET ORAL DAILY
Qty: 30 TABLET | Refills: 2 | Status: SHIPPED | OUTPATIENT
Start: 2024-07-23

## 2024-07-24 ENCOUNTER — TELEPHONE (OUTPATIENT)
Age: 59
End: 2024-07-24
Payer: COMMERCIAL

## 2024-07-25 ENCOUNTER — OFFICE VISIT (OUTPATIENT)
Age: 59
End: 2024-07-25
Payer: COMMERCIAL

## 2024-07-25 VITALS
WEIGHT: 164 LBS | HEART RATE: 68 BPM | OXYGEN SATURATION: 98 % | SYSTOLIC BLOOD PRESSURE: 118 MMHG | BODY MASS INDEX: 26.36 KG/M2 | HEIGHT: 66 IN | DIASTOLIC BLOOD PRESSURE: 64 MMHG

## 2024-07-25 DIAGNOSIS — B35.1 ONYCHOMYCOSIS: Primary | ICD-10-CM

## 2024-07-25 DIAGNOSIS — L60.3 DYSTROPHIC NAIL: ICD-10-CM

## 2024-07-25 PROBLEM — M25.50 POLYARTHRALGIA: Status: ACTIVE | Noted: 2019-03-22

## 2024-07-25 PROBLEM — R73.03 PREDIABETES: Status: ACTIVE | Noted: 2018-09-04

## 2024-07-25 PROBLEM — E53.8 VITAMIN B 12 DEFICIENCY: Status: ACTIVE | Noted: 2018-08-29

## 2024-07-25 PROBLEM — F41.9 ANXIETY AND DEPRESSION: Status: ACTIVE | Noted: 2024-06-25

## 2024-07-25 PROBLEM — F32.A ANXIETY AND DEPRESSION: Status: ACTIVE | Noted: 2024-06-25

## 2024-07-25 RX ORDER — METAXALONE 800 MG/1
800 TABLET ORAL 3 TIMES DAILY PRN
Qty: 90 TABLET | Refills: 5 | Status: SHIPPED | OUTPATIENT
Start: 2024-07-25

## 2024-07-29 ENCOUNTER — OFFICE VISIT (OUTPATIENT)
Dept: CARDIOLOGY | Facility: CLINIC | Age: 59
End: 2024-07-29
Payer: COMMERCIAL

## 2024-07-29 VITALS
BODY MASS INDEX: 26.36 KG/M2 | HEIGHT: 66 IN | SYSTOLIC BLOOD PRESSURE: 123 MMHG | RESPIRATION RATE: 18 BRPM | WEIGHT: 164 LBS | HEART RATE: 79 BPM | OXYGEN SATURATION: 98 % | DIASTOLIC BLOOD PRESSURE: 80 MMHG

## 2024-07-29 DIAGNOSIS — I10 ESSENTIAL HYPERTENSION: ICD-10-CM

## 2024-07-29 DIAGNOSIS — I71.21 ANEURYSM OF ASCENDING AORTA WITHOUT RUPTURE: ICD-10-CM

## 2024-07-29 DIAGNOSIS — Q24.5 MYOCARDIAL BRIDGE: Primary | ICD-10-CM

## 2024-07-29 DIAGNOSIS — R06.09 DOE (DYSPNEA ON EXERTION): ICD-10-CM

## 2024-07-29 DIAGNOSIS — Z98.890 H/O HEART SURGERY: ICD-10-CM

## 2024-07-29 DIAGNOSIS — Z95.0 PRESENCE OF CARDIAC PACEMAKER: ICD-10-CM

## 2024-07-29 DIAGNOSIS — Q23.1 BICUSPID AORTIC VALVE: ICD-10-CM

## 2024-07-29 DIAGNOSIS — R73.03 PREDIABETES: ICD-10-CM

## 2024-07-29 DIAGNOSIS — Z95.2 AORTIC VALVE REPLACED: ICD-10-CM

## 2024-07-29 PROCEDURE — 99214 OFFICE O/P EST MOD 30 MIN: CPT | Performed by: INTERNAL MEDICINE

## 2024-07-29 NOTE — PROGRESS NOTES
Kelli Pedro  [id number]  1965  58 y.o.  female    Referring Provider: ELLEN Tidwell DO    Reason for  Visit:  short term office follow up after recent encounter     Oley surgery as below     Pablito Weathers NP - 06/27/2024 11:12 AM CDT  Formatting of this note is different from the original.  Inpatient Discharge Summary    BRIEF OVERVIEW    Admitting Provider: Marlo Gilmore MD  Discharge Provider: Tayla Wolf MD  Primary Care Physician at Discharge: ELLEN Tidwell [phone]    Admission Date: 6/18/2024 Discharge Date: 6/27/2024  Admission Location: [facility] Problems/Diagnoses:  Principal Problem:  Aneurysm of ascending aorta without rupture (HCC)  Active Problems:  Tamponade  Acute postoperative pain  Anxiety and depression  Heart block  GERD (gastroesophageal reflux disease)  At risk for falls  Resolved Problems:  No resolved hospital problems.    DETAILS OF HOSPITAL STAY    Presenting Problem/History of Present Illness:      Mrs Pedro is a 58 year old female with past medical history of anxiety, AAA, calcified bicuspid aortic valve, HTN, HLD, Asthma, ANGELICA, CKD, DVT, IBS, GERD, Hashimoto's, and Lupus who presented with recurrent chest pain and shortness of breath. The patient underwent coronary angiogram which showed significant myocardial bridging of the LAD also with moderate ischemia noted on the apical lesion with myocardial perfusion. The patient also has 4.5 cm ascending aortic aneurysm and bicuspid aortic valve with calcification. Therefore the patient was scheduled for unroofing of the myocardial bridge of the LAD and aortic aneurysm replacement.    Hospital Course:    Mrs Pedro was taken to the operating room on 6/18: Ascending hemiarch replacement (28mm Dacron), AVR (Inspiris 25mm), and myocardial bridge resection by Dr Wolf. Postoperatively she developed significant hypotension and high chest tube output. She  was taken back to the OR with findings of of bleeding from retrograde cannulation site, which was oversewn and an Evarest patch applied, muscle edge on L inferior chest wall oozing and bovied.The patient was taken to the intensive care unit postoperatively in stable condition. When hemodynamically stable, she was weaned from ventilatory support (6/19). She had hypotension requiring vasopressor support which was weaned off 6/23. EP was consulted for intermittent AF with CHB. The patient was transferred to the stepdown unit in stable condition on 6/25. Chest tubes were discontinued without difficulty. Aggressive physical therapy and pulmonary toilet were initiated, diet was advanced as tolerated, and wounds remained clean, dry and intact. The patient progressed as expected. Due to ongoing intermittent heart block. She underwent placement of a Medtronic PPM on 6/26 by the EP team. The epicardial wires were removed after the procedure. The device was interrogated by the [employer] rep post procedure and found to be fully functional. A follow up CXR was completed and was without pneumothorax. Today she is deemed stable to be discharged to home.    Active Issues Requiring Follow-up:  None    Test Results Pending at Discharge:  Pending Labs    Order Current Status  Basic metabolic panel In process  CBC without differential In process  Fibrinogen In process  Hemoglobin A1c In process  Lipid panel In process  Magnesium In process  Phosphorus In process  Phosphorus In process        Operative Procedures Performed:    Ascending hemiarch replacement (28mm Dacron), AVR (Inspiris 25mm), and myocardial bridge resection    Other Procedures:    Procedure(s):  IMPLANT DUAL CHAMBER PPM SYSTEM W/ DUAL ELECTRODES (GEN AND LEADS, NEW OR REPLACE) 75680    Pertinent Test Results:  None    Discharge Details    Physical Exam at Discharge:  Discharge Condition: good  Pulse: 61  Resp: 20  BP: 95/68  Temp: 36.9 °C (98.4 °F)  Weight: 77.5 kg (170  lb 13.7 oz)  Pertinent Exam Findings at Discharge: Physical Exam  Vitals reviewed.  Constitutional:  Appearance: Normal appearance.  Eyes:  Pupils: Pupils are equal, round, and reactive to light.  Cardiovascular:  Rate and Rhythm: Normal rate and regular rhythm.  Pulses: Normal pulses.  Heart sounds: Normal heart sounds.  Pulmonary:  Effort: Pulmonary effort is normal.  Breath sounds: Normal breath sounds.  Abdominal:  General: Bowel sounds are normal.  Palpations: Abdomen is soft.  Musculoskeletal:  Comments: Trace BLE edema  Skin:  General: Skin is warm and dry.  Comments: Midline sternotomy incision clean, dry, well approximated  Left subclavian pacemaker generator site covered with dry dressing, intact  Neurological:  General: No focal deficit present.  Mental Status: She is alert and oriented to person, place, and time.  Psychiatric:  Mood and Affect: Mood normal.  Behavior: Behavior normal.        Discharge Disposition:  Discharge to home, home health skilled care    Code Status at Discharge: Full    Discharge Instructions:    Activity Instructions    Discharge Activity: Driving restrictions  Do not drive until your doctor says it's ok.  Discharge Activity: Lifting restrictions  * Do not lift anything over 10 pounds, including children and pets.    * Do not lift your elbow above your shoulder on the operated side for 6 weeks.    * Wear your sling at night for 6 weeks.  Discharge activity: Shower  Do not shower for 7 days. Keep your incision dry.        Other Instructions    Discharge instructions  Post-Discharge Dressing Care (Specify Details)  Keep incision clean and dry. Let steri-strips come off by themselves; this may take 7 to 10 days.    Home Health agency: [facility]  Skilled Nursing    Phone number: [phone]    You should be contacted by the home health care agency within 1-2 days regarding scheduling a visit. If you don't hear from them, please call the number listed  above to follow-up. Thank you        Discharge Medications:    Current Medications    TAKE these medications  acetaminophen 500 mg capsule  Take 2 capsules (1,000 mg total) by mouth every 6 (six) hours as needed  for pain    aspirin 81 mg chewable tablet  Take 1 tablet (81 mg total) by mouth daily  Start taking on: June 28, 2024    atorvastatin 40 mg tablet  Take 1 tablet (40 mg total) by mouth nightly  Commonly known as: LIPITOR    busPIRone 5 mg tablet  Take 1 tablet (5 mg total) by mouth 3 (three) times a day as needed  (anxiety)  For: repeated episodes of anxiety  Commonly known as: BUSPAR    Emgality Syringe 120 mg/mL syringe  Inject 1 mL under the skin every 30 (thirty) days Last dose 5/12/24  For: migraine prevention  Generic drug: galcanezumab-gnlm    eszopiclone 3 mg tablet  Take 1 tablet (3 mg total) by mouth nightly Take immediately before  bedtime  For: difficulty sleeping  Commonly known as: LUNESTA    fluticasone propionate 50 mcg/actuation nasal spray  Administer 2 sprays into each nostril daily as needed for rhinitis or  allergies  For: inflammation of the nose due to an allergy  Commonly known as: FLONASE    furosemide 40 mg tablet  Take 1 tablet (40 mg total) by mouth every morning  For: visible water retention  Commonly known as: LASIX    hydrOXYzine 10 mg tablet  Take 1 tablet (10 mg total) by mouth 3 (three) times a day as needed for  itching  For: itching  Commonly known as: ATARAX    lansoprazole 30 mg capsule  Take 1 capsule (30 mg total) by mouth daily after lunch  For: GERD  Commonly known as: PREVACID    leflunomide 10 mg tablet  Take 1 tablet (10 mg total) by mouth every other day  For: rheumatoid arthritis  Commonly known as: ARAVA    * levothyroxine 75 mcg tablet  Take 1 tablet (75 mcg total) by mouth 5 (five) times a week Does not take  on Sundays  For: a condition with low thyroid hormone levels  Commonly known as: SYNTHROID    * levothyroxine 75 mcg tablet  Take 0.5 tablets (37.5 mcg  total) by mouth once a week wednesdays  For: a condition with low thyroid hormone levels  Commonly known as: SYNTHROID    lidocaine 5 %  Place 1 patch on the skin daily as needed for pain  For: nerve pain after herpes  Commonly known as: LIDODERM    linaCLOtide 290 mcg capsule  Take 1 capsule (290 mcg total) by mouth every morning Sometimes does not  take everyday  For: chronic idiopathic constipation  Commonly known as: LINZESS    magnesium oxide 400 mg (241.3 mg elemental magnesium) tablet  Take 1 tablet (400 mg total) by mouth daily after lunch  For: low amount of magnesium in the blood  Commonly known as: MAG-OX    metaxalone 800 mg tablet  Take 1 tablet (800 mg total) by mouth 3 (three) times a day as needed for  muscle spasms  For: muscle spasm  Commonly known as: SKELAXIN    ondansetron ODT 4 mg disintegrating tablet  Place 1 tablet (4 mg total) under the tongue every 8 (eight) hours as  needed for nausea or vomiting  For: n/v  Commonly known as: ZOFRAN-ODT    oxyCODONE 5 mg immediate release tablet  Take 1 tablet (5 mg total) by mouth every 4 (four) hours as needed for  pain  For: pain  Commonly known as: ROXICODONE    PARoxetine 10 mg tablet  Take 1 tablet (10 mg total) by mouth nightly  For: anxiousness associated with depression  Commonly known as: PAXIL    pilocarpine 5 mg tablet  Take 1 tablet (5 mg total) by mouth 3 (three) times a day  For: dry mouth  Commonly known as: SALAGEN    potassium chloride ER 10 mEq CR tablet  Take 1 tablet/capsule (10 mEq total) by mouth 2 (two) times a day  For: prevention of low potassium in the blood  Commonly known as: KLOR-CON    senna 8.6 mg tablet  Take 1 tablet by mouth 2 (two) times a day  For: constipation  Commonly known as: SENOKOT    ubrogepant 100 mg tablet  Take 1 tablet (100 mg total) by mouth once as needed for migraine May  repeat dose once in 2 hours if no relief. Do not exceed 2 doses in 24  hours.  For: a migraine headache  Commonly known as:  UBRELVY      * This list has 2 medication(s) that are the same as other medications  prescribed for you. Read the directions carefully, and ask your doctor or  other care provider to review them with you.          Outpatient Follow-Up:    Contact Information for Follow-ups    ELLEN Tidwell DO  Specialty: Internal Medicine  Relationship: PCP - [address] [place] [address]  Phone: [phone]    Next Steps: Follow up  Randi Wolf MD  Specialty: Cardiothoracic Surgery, General Surgery, Thoracic Surgery  [address]  [id number]  [place] [address]  Phone: [phone]    Next Steps: Follow up  Comments: Dr. Wolf will see you for follow up in 4 weeks in the [facility] in the [facility] ([facility]), [address] [place] [address]- 8th floor [address], [place] [address]. Please call [phone] for questions or concerns or to change your appointment.  Questions:  To provider: RANDI WOLF  Miscellaneous, Not In File    Next Steps: Follow up  Comments: Please follow up with your Cardiologist in 3-4 weeks.  Questions:  To provider: MISCELLANEOUS, NOT IN FILE  ELLEN Tidwell DO  Specialty: Internal Medicine  Relationship: PCP - [address] [place] [address]  Phone: [phone]    Next Steps: Follow up  Instructions: Upon return home contact your primary care provider to arrange to be seen in the next several weeks  Comments: Follow up with established provider: 2 weeks  Questions:  To provider: ELLEN TIDWELL  Instructions for follow-up (appointment date and time): Upon return home contact your primary care provider to arrange to be seen in the next several weeks          Electronically signed by Randi Wolf MD at 06/27/2024 11:51 AM CDT   Cath as below   Dr Washington saw and evaluation as below     On review of the CT scan of the chest today, CT angiogram of  the chest, she has an ascending aorta measures in greatest size of 4.9 and in several images also to include 4.8 cm in size.  Her aorta is fusiform morphology.  The aortic root measures 4.1 cm in size.  She has a known bicuspid valve with mild aortic valve stenosis.  Her ventricular function is preserved.  She is having chest pain at this time.  She has taken nitroglycerin with resolution.  Potentially her chest pain could be secondary to her aortic aneurysm.  I will reach out to you to discuss the potential merits of further cardiac evaluation for ischemia.  If we can not determine any other etiologies for her chest pain, she may benefit from aneurysectomy even though she is not yet 5 cm in size.  At that time, we will consider valve replacement as well with concomitant replacement and circulatory rest to address her hemiarch resection.  I discussed current plans to proceed for the aforementioned plan.  She is agreeable to do so.  I once again discussed the importance of presenting to the emergency department if she has any new bouts of chest pain and to inform that provider that she is a known patient of mine with an aneurysm measuring 4.9 cm in size.  Unfortunately, I do not have any other imaging to compare and we will plan to see her back in 3 months if everything else is performed and does not elucidate etiology, as well as the decision is made to not proceed with aneurysm as her chest pain is not secondary to her aneurysm.  A shorter interval has been determined due to her size and not knowing at this point the rate of growth.  We also discussed scenario that she may require addressing a coronary artery disease and potentially even if it is single vessel disease, the argument could be made to consider aneurysectomy now instead of undergoing up to 1 year DAPT with an aneurysm at the size it is at this time.  All questions were answered reviewed and she is agreeable to the aforementioned plan.  Separately, she  did provide information that she has now been recently diagnosed with type 2 diabetes mellitus.  She is asked to seek new primary care physician.  We will try to facilitate an avenue toward other providers.  She is a non-smoker.  Many thanks again, Sharon.  I will continue to be a apprised of care as it ensues.    Subjective       Went to ER   Reviewed records from the ER    Saw Dr Espinoza     Will see Dr in [facility] in [place]    Passed kidney stone and feels better     Overall feels getting more chest pain and now during daytime hours and during exertion  She feels NTG helps though typically not used for bridging   At times pleuritic, no radiation  No associated diaphoresis  No associated nausea   Resolves by itself, occurring for years   Low risk stress echo [facility]       Persistent pedal edema  Chronic venous insufficiency of legs     No fever or chills  No significant expectoration    No hemoptysis  No presyncope or syncope    Tolerating current medications well with no untoward side effects   Compliant with prescribed medication regimen. Tries to adhere to cardiac diet.   myocardial perfusion scan as below     On Ranexa and feels better       History of present illness:  Kelli Pedro is a 58 y.o. yo female with bicuspid aortic valve who presents today for   Chief Complaint   Patient presents with    GONZALEZ (dyspnea on exertion)     Follow up   .    History  Past Medical History:   Diagnosis Date    AAA (abdominal aortic aneurysm)     Abnormal ECG 03/14/2023    Allergic     Anemia     Angina pectoris     Anxiety     Anxiety and depression 6/25/2024    Aortic valve replaced 7/29/2024    Arthritis     Asthma     Bicuspid aortic valve 03/14/2023    Cataract     Surgery 2022    Cholelithiasis     Chronic kidney disease     Clotting disorder     Seeing Leticia Flores at [facility] currently    Colon polyp     Coronary artery disease     Deep vein thrombosis     Dental disease     Diabetes mellitus      Diverticulitis of colon     Diverticulosis     GERD (gastroesophageal reflux disease)     Headache     Heart murmur     Hernia     HL (hearing loss)     Hypertension     Hyperthyroidism     Hypothyroid     Irritable bowel syndrome     Kidney stone     Previous haf lithotripsy    Low back pain     Lupus     Mitral valve prolapse     In the [facility] Dr. Claudio Gibson    NSTEMI (non-ST elevated myocardial infarction) 12/26/2023    Obesity     Osteopenia     Pancreatitis     Peptic ulcer disease     Peptic ulceration     Pneumonia     RBBB 03/14/2023    Scoliosis     Sinusitis     Sjogren's disease     Sleep apnea     Years ago, but no longer have since Gastric Sleeve Surgery    Tinnitus     Ulcerative colitis     Urinary tract infection     Visual impairment    ,   Past Surgical History:   Procedure Laterality Date    ABDOMINAL AORTIC ANEURYSM REPAIR  06/18/2024    ABDOMINAL SURGERY      ANKLE SURGERY      ASCENDING ARCH/HEMIARCH REPLACEMENT  06/18/2024    BACK SURGERY      BARIATRIC SURGERY      CARDIAC CATHETERIZATION N/A 12/26/2023    Procedure: Left Heart Cath;  Surgeon: Pablito De Leon DO;  Location:  [facility] CATH [facility]AS[facility] LOC[facility];  Service: Cardiovascular;  Laterality: N/A;    CARDIAC CATHETERIZATION  12/26/2023    CARPAL TUNNEL RELEASE Bilateral     CHOLECYSTECTOMY      COLONOSCOPY  04/24/2023    normal colon consistent with ibs    ENDOSCOPY  04/24/2023    large hitial hernia,chronic gastritis    EYE SURGERY  2022    Cataracts both eyes    FLEXIBLE SIGMOIDOSCOPY      FOOT SURGERY      GALLBLADDER SURGERY      HYSTERECTOMY      PACEMAKER IMPLANTATION  06/2024    SHOULDER SURGERY      SUBTOTAL HYSTERECTOMY      TUBAL ABDOMINAL LIGATION      UPPER GASTROINTESTINAL ENDOSCOPY      VEIN SURGERY      [facility]   ,   Family History   Problem Relation Age of Onset    Breast cancer Mother 70    Diabetes Mother     Hearing loss Mother     Heart disease Mother      Hyperlipidemia Mother     Vision loss Mother     Clotting disorder Mother     Fainting Mother     Hypertension Mother     Cancer Mother     Migraines Mother     Inflammatory bowel disease Mother     Scleroderma Father     Diabetes Father     Hearing loss Father     Heart disease Father     Hyperlipidemia Father     Vision loss Father     Clotting disorder Father     Fainting Father     Heart attack Father     Hypertension Father     Cancer Father     Stroke Father     Heart valve disorder Father     Colon polyps Father     Inflammatory bowel disease Father     Ulcerative colitis Father     Diabetes Sister     Hyperlipidemia Sister     Vision loss Sister     Asthma Sister     Hypertension Sister     Thyroid disease Sister     Hyperlipidemia Brother     Vision loss Brother     Asthma Brother     Hypertension Brother     Diabetes Brother     Colon cancer Neg Hx    ,   Social History     Tobacco Use    Smoking status: Former     Current packs/day: 0.25     Average packs/day: 0.3 packs/day for 59.2 years (14.8 ttl pk-yrs)     Types: Cigarettes     Start date: 1/1/1991     Quit date: 1/1/1987     Passive exposure: Past    Smokeless tobacco: Never    Tobacco comments:     [id number] for 6 months only   Vaping Use    Vaping status: Never Used   Substance Use Topics    Alcohol use: Not Currently     Comment: Socially very rare    Drug use: Never   ,     Medications  Current Outpatient Medications   Medication Sig Dispense Refill    acetaminophen (TYLENOL) 500 MG tablet Take 1 tablet by mouth Every 4 (Four) Hours As Needed.      aspirin 81 MG chewable tablet Chew 1 tablet Daily.      atorvastatin (LIPITOR) 40 MG tablet Take 1 tablet by mouth Daily.      Continuous Glucose  (FreeStyle Aydin 2 [place]) device Use 1 each Continuous.      Continuous Glucose Sensor (FreeStyle Aydin 2 Sensor) misc Use 1 each Every 14 (Fourteen) Days.      eszopiclone (LUNESTA) 3 MG tablet Take 1 tablet by mouth Every Night. Take immediately  before bedtime 90 tablet 0    fluticasone (FLONASE) 50 MCG/ACT nasal spray 2 sprays into the nostril(s) as directed by provider Daily. 16 g 1    furosemide (LASIX) 40 MG tablet Take 1 tablet by mouth Daily. 30 tablet 2    Galcanezumab-gnlm (Emgality) 120 MG/ML solution prefilled syringe Inject 1 mL under the skin into the appropriate area as directed Every 30 (Thirty) Days. 1 mL 5    hydrOXYzine (ATARAX) 10 MG tablet Take 1 tablet by mouth 3 (Three) Times a Day As Needed for Itching.      lansoprazole (PREVACID) 30 MG capsule Take 1 capsule by mouth Daily. 90 capsule 3    leflunomide (ARAVA) 10 MG tablet Take 1 tablet by mouth Every Other Day.      levothyroxine (Synthroid) 75 MCG tablet Take 1/2 tablet on Wednesdays. Skip Sunday dose. Take one tablet daily Kbhydv-Ctpmwik-Zwanxyce-Friday-Saturday. 30 tablet 11    lidocaine (LIDODERM) 5 % Place 1 patch on the skin as directed by provider Daily. Remove & Discard patch within 12 hours or as directed by MD 14 each 1    linaclotide (LINZESS) 290 MCG capsule capsule Take 1 capsule by mouth Daily. 90 capsule 1    magnesium oxide (MAG-OX) 400 MG tablet Take 1 tablet by mouth Daily. 90 tablet 1    metaxalone (SKELAXIN) 800 MG tablet Take 1 tablet by mouth 3 (Three) Times a Day As Needed for Muscle Spasms. 90 tablet 5    Narcan 4 MG/0.1ML nasal spray 1 spray into the nostril(s) as directed by provider As Needed.      nitroglycerin (NITROSTAT) 0.4 MG SL tablet Place 1 tablet under the tongue Every 5 (Five) Minutes As Needed for Chest Pain. Take no more than 3 doses in 15 minutes. 100 tablet 2    ondansetron ODT (ZOFRAN-ODT) 4 MG disintegrating tablet Place 1 tablet on the tongue Every 8 (Eight) Hours As Needed for Nausea or Vomiting. 30 tablet 5    PARoxetine (Paxil) 10 MG tablet Take 1 tablet by mouth Every Morning. 30 tablet 1    pilocarpine (SALAGEN) 5 MG tablet Take 1 tablet by mouth 3 (Three) Times a Day.      potassium chloride (K-DUR,KLOR-CON) 10 MEQ CR tablet Take 1  "tablet by mouth 2 (Two) Times a Day.      potassium chloride 10 MEQ CR tablet Take 1 tablet by mouth 2 (Two) Times a Day With Meals.      traMADol (ULTRAM) 50 MG tablet Take 1 tablet by mouth.      ubrogepant (UBRELVY) 100 MG tablet Take 1 tablet at the onset of headache. May repeat dose x1 in 2 hours if headache is not resolved. Max dose 200 mg/24 hours. 30 tablet 2     No current facility-administered medications for this visit.       Allergies:  Clavulanic acid, Gadolinium, Verapamil hcl er, Zoster vac recomb adjuvanted, Flagyl [metronidazole], Estradiol, Hydroxychloroquine, Metformin, Prednisone, Protonix [pantoprazole], Tetracyclines & related, and Trazodone    Review of Systems  Review of Systems   Constitutional: Negative.   HENT: Negative.     Eyes: Negative.    Cardiovascular:  Positive for chest pain, dyspnea on exertion and leg swelling. Negative for claudication, cyanosis, irregular heartbeat, near-syncope, orthopnea, palpitations, paroxysmal nocturnal dyspnea and syncope.   Respiratory: Negative.     Endocrine: Negative.    Hematologic/Lymphatic: Negative.    Skin: Negative.    Musculoskeletal:  Positive for back pain and joint pain.   Gastrointestinal:  Negative for anorexia.   Genitourinary: Negative.    Neurological: Negative.    Psychiatric/Behavioral: Negative.         Objective     Physical Exam:  /80   Pulse 79   Resp 18   Ht 167.6 cm (65.98\")   Wt 74.4 kg (164 lb)   SpO2 98%   BMI 26.49 kg/m²     Physical Exam  Constitutional:       Appearance: Normal appearance.   HENT:      Head: Normocephalic.   Eyes:      General: Lids are normal.   Neck:      Vascular: No carotid bruit.   Cardiovascular:      Rate and Rhythm: Regular rhythm.      Heart sounds: S1 normal and S2 normal. Murmur heard.      Systolic murmur is present with a grade of 2/6.   Pulmonary:      Effort: Pulmonary effort is normal.   Abdominal:      Palpations: Abdomen is soft.   Musculoskeletal:      Right lower le+ " Pitting Edema present.      Left lower le+ Pitting Edema present.   Neurological:      Mental Status: She is alert.   Psychiatric:         Speech: Speech normal.         Behavior: Behavior normal.         Thought Content: Thought content normal.     Surgical wound healed very well. No evidence of excessive bruising, pain, redness, swelling, discharge or foul odor noted  Patient declined any recent history of fever, chills, pain or warmth locally     Results Review:    Marjorie Menendez APRN   Nurse Practitioner  Specialty: Cardiothoracic Surgery     Telephone Encounter     Signed     Encounter Date: 2024     Signed         Dr. Washington called patient and discussed: ascending aorta measures between 4.6 cm and 4.8 cm on recent imaging that has been obtained through recent ER evaluations, advised that cardiac catheterization findings-coronary vasospasm with myocardial bridging, patient states she is still having chest pain and taking SL NTG with relief in chest pain to which Dr. Washington advised that should not be caused by her aneurysm. Dr. Washington advised to reach out to cardiology and recommended GI evaluation for possible esophageal spasms. She was advised that aneurysm surgery is not recommended at this time. Patient states her main cardiologist is Dr. Bay. Will place referral to gastroenterology-patient indifferent to specific provider. Keep planned follow up with Dr. Washington.                 Narrative & Impression   EXAMINATION: CT ANGIOGRAM CHEST- 2023 11:36 AM     HISTORY: Hypertension, dyspnea on exertion. Precordial chest pain.     DOSE: 493 mGycm (Automatic exposure control technique was implemented in  an effort to keep the radiation dose as low as possible without  compromising image quality)     REPORT:  Spiral CT of the chest was performed after administration of intravenous  contrast from the thoracic inlet through the upper abdomen using CTA  protocol, which includes reconstructed maximum  intensity projection  (MIP)coronal and sagittal images.     Comparison: CT angio chest 12/9/2023.     The contrast bolus is satisfactory, timing of contrast is optimized for  the thoracic aorta. The central pulmonary arteries are normal in  caliber. The ascending aorta is dilated, but not aneurysmal, with a  diameter of 4.4 cm. No aortic dissection is identified, there is cardiac  motion artifact which simulates an intimal flap at the aortic root. At  the arch level, the aorta has a maximum diameter of 2.5 cm which is  normal. The descending thoracic aorta has a maximum diameter of 2.3 cm.  The thyroid gland appears normal. Heart size is normal small volume of.  Calcification is noted at the aortic valve. No pleural effusion is  identified. No intrathoracic lymphadenopathy is identified. Review of  lung windows demonstrates minimal dependent atelectasis bilaterally.  There is no evidence of pneumonia. The airways are patent. Review of  bone windows is unremarkable. In the upper abdomen, there is a  benign-appearing cyst at the upper pole of the left kidney, measuring  approximately 10 mm. Cholecystectomy clips are present. There is  evidence of previous gastric sleeve surgery.     IMPRESSION:  1. No acute intrathoracic abnormality is identified. There is dilation  of the ascending aorta, with a maximal diameter of 4.4 cm, no aortic  aneurysm or dissection is identified. Small volume of calcification is  noted at the aortic valve. Correlate clinically for aortic stenosis.              This report was signed and finalized on 12/26/2023 11:45 AM by Dr. Mark Saenz MD.       Results for orders placed during the hospital encounter of 11/02/23    Adult Transthoracic Echo Complete w/ Color, Spectral and Contrast if necessary per protocol    Interpretation Summary    Left ventricular systolic function is normal. Calculated left ventricular EF = 64.4% Left ventricular ejection fraction appears to be 61 - 65%.    Left  ventricular diastolic function was normal.    Mild aortic valve stenosis is present.  Calcified bicuspid aortic valve noted    Estimated right ventricular systolic pressure from tricuspid regurgitation is normal (<35 mmHg).    Thoracic aortic aneurysm that measures 4.6 cm in diameter    Normal global longitudinal LV strain (GLS) = -18.2%.       Patient Information    Patient Name  Kelli Pedro MRN  [id number] Legal Sex  Female  (Age)  1965 (58 y.o.)     Regadenoson Stress Test with Myocardial Perfusion SPECT (Multi Study)    Accession Number: [id number]   Date of Study: 23   Ordering Provider: Sharon Maddox APRN   Clinical Indications: Chest Pain        Interpreting Physicians  Performing Staff   Gavino Bay MD Tech: Sterling Beck [facility]   Support Staff: Magda Aguirre, [facility]    Helene Lopez RN        Interpretation Summary         Left ventricular ejection fraction is normal (Calculated EF = 65%).    Breast attenuation artifact is present.    Moderate area of ischemia noted in the apical and inferior apical region               Summary    Normal left ventricular size with preserved LV function and an estimated    ejection fraction of approximately 55-60%. Normal left ventricular wall    thickness. Normal diastolic filling pattern.    Normal right ventricular size with preserved RV function.    No significant valvular stenosis or regurgitation.    No evidence of significant pericardial effusion is noted.    Aortic root is dilated measuring 3.76 cm at the level of the sinotubular    junction (normal 2.6 +/- 0.3 cm).    The rhythm is sinus with BBB.      Signature      ----------------------------------------------------------------    Electronically signed by Nik King(Interpreting physician)    on 2022 03:16 PM    ----------------------------------------------------------------     Conclusions      Summary    Stress echocardiogram without clinical,  electrocardiographic, or    echocardiographic evidence of myocardial ischemia. Ramirez Treadmill Score    was 8. There is low risk of myocardial ischemia.      Signature      ----------------------------------------------------------------    Electronically signed by Nik King(Interpreting physician)    on 08/25/2022 09:27 AM     ____________________________________________________________________________________________________________________________________________  Health maintenance and recommendations    Low salt/ HTN/ Heart healthy carbohydrate restricted cardiac diet   The patient is advised to reduce or avoid caffeine or other cardiac stimulants.   Minimize or avoid  NSAID-type medications      Monitor for any signs of bleeding including red or dark stools. Fall precautions.   Advised staying uptodate with immunizations per established standard guidelines.    Offered to give patient  a copy of my notes     Questions were encouraged, asked and answered to the patient's  understanding and satisfaction. Questions if any regarding current medications and side effects, need for refills and importance of compliance to medications stressed.    Reviewed available prior notes, consults, prior visits, laboratory findings, radiology and cardiology relevant reports. Updated chart as applicable. I have reviewed the patient's medical history in detail and updated the computerized patient record as relevant.      Updated patient regarding any new or relevant abnormalities on review of records or any new findings on physical exam. Mentioned to patient about purpose of visit and desirable health short and long term goals and objectives.    Primary to monitor CBC CMP Lipid panel and TSH as applicable    ___________________________________________________________________________________________________________________________________________   Procedures    Assessment & Plan   Diagnoses and all orders for this visit:    1.  Myocardial bridge (Primary)    2. Essential hypertension    3. Bicuspid aortic valve (s/p AVR)    4. Aneurysm of ascending aorta without rupture    5. Prediabetes    6. GONZALEZ (dyspnea on exertion)    7. H/O heart surgery  -     Ambulatory Referral to Cardiac Rehab    8. Aortic valve replaced    9. Presence of cardiac pacemaker              Plan    Overall doing well   Will refer to cardiac rehab, she will     Orders Placed This Encounter   Procedures    Ambulatory Referral to Cardiac Rehab     Referral Priority:   Routine     Referral Type:   Rehabilitation - Outpatient     Requested Specialty:   Cardiac Rehabilitation     Number of Visits Requested:   1        Patient expressed understanding  Encouraged and answered all questions   Discussed with the patient and all questioned fully answered. He will call me if any problems arise.     Check BP and heart rates twice daily initially till blood pressures and heart rates under good control and then at least 3x / week,   If blood pressures continue to be well-controlled then can check week a month  at home and bring a recording for review next visit  If BP >110/85 or < 100/60 persistently over 3 reading 30 mins apart or if heart rates persistently above 100 bpm or less than 55 bpm call sooner for evaluation and advise            Return in about 4 months (around 11/29/2024).

## 2024-07-30 ENCOUNTER — OFFICE VISIT (OUTPATIENT)
Dept: INTERNAL MEDICINE | Facility: CLINIC | Age: 59
End: 2024-07-30
Payer: COMMERCIAL

## 2024-07-30 VITALS
OXYGEN SATURATION: 98 % | WEIGHT: 167 LBS | HEIGHT: 66 IN | BODY MASS INDEX: 26.84 KG/M2 | TEMPERATURE: 96.8 F | DIASTOLIC BLOOD PRESSURE: 76 MMHG | SYSTOLIC BLOOD PRESSURE: 110 MMHG | HEART RATE: 65 BPM

## 2024-07-30 DIAGNOSIS — M79.2 NEUROPATHIC PAIN OF CHEST: Primary | ICD-10-CM

## 2024-07-30 DIAGNOSIS — G43.109 MIGRAINE WITH AURA AND WITHOUT STATUS MIGRAINOSUS, NOT INTRACTABLE: ICD-10-CM

## 2024-07-30 PROCEDURE — 99214 OFFICE O/P EST MOD 30 MIN: CPT | Performed by: INTERNAL MEDICINE

## 2024-07-30 RX ORDER — GABAPENTIN 100 MG/1
100 CAPSULE ORAL 3 TIMES DAILY
Qty: 90 CAPSULE | Refills: 0 | Status: SHIPPED | OUTPATIENT
Start: 2024-07-30

## 2024-07-30 RX ORDER — PROPRANOLOL HYDROCHLORIDE 10 MG/1
10 TABLET ORAL 2 TIMES DAILY
Qty: 60 TABLET | Refills: 1 | Status: SHIPPED | OUTPATIENT
Start: 2024-07-30

## 2024-07-30 NOTE — PROGRESS NOTES
"    Chief Complaint  Headache (Dr. Bay is requesting patient to be put on preventative migraine medication. Patient is also having burning sensation in chest. )    Sarika Pedro presents to [facility]  History of Present Illness  See below.     Objective   Vital Signs:  /76 (BP Location: Right arm, Patient Position: Sitting, Cuff Size: Adult)   Pulse 65   Temp 96.8 °F (36 °C) (Temporal)   Ht 167.6 cm (65.98\")   Wt 75.8 kg (167 lb)   SpO2 98%   BMI 26.97 kg/m²   Estimated body mass index is 26.97 kg/m² as calculated from the following:    Height as of this encounter: 167.6 cm (65.98\").    Weight as of this encounter: 75.8 kg (167 lb).         Physical Exam  HENT:      Head: Normocephalic and atraumatic.   Eyes:      Conjunctiva/sclera: Conjunctivae normal.      Pupils: Pupils are equal, round, and reactive to light.   Cardiovascular:      Rate and Rhythm: Normal rate and regular rhythm.      Heart sounds: Normal heart sounds.      Comments: Sternotomy is well-healing.  Pulmonary:      Effort: Pulmonary effort is normal. No respiratory distress.      Breath sounds: Normal breath sounds.   Musculoskeletal:         General: No swelling.      Cervical back: Neck supple.   Skin:     General: Skin is warm and dry.      Findings: No rash.   Neurological:      General: No focal deficit present.      Mental Status: She is alert and oriented to person, place, and time.   Psychiatric:         Mood and Affect: Mood normal.         Behavior: Behavior normal.         Thought Content: Thought content normal.         Judgment: Judgment normal.        Result Review :  Nothing new to review.         Assessment and Plan   Diagnoses and all orders for this visit:    1. Neuropathic pain of chest (Primary)  -     gabapentin (NEURONTIN) 100 MG capsule; Take 1 capsule by mouth 3 (Three) Times a Day.  Dispense: 90 capsule; Refill: 0    2. Migraine with aura and without status " migrainosus, not intractable  -     propranolol (INDERAL) 10 MG tablet; Take 1 tablet by mouth 2 (Two) Times a Day.  Dispense: 60 tablet; Refill: 1       Presents today to discuss medications.    I last saw her on 7/5.  She had been released from [facility] on 6/27 after undergoing hemiarch replacement, aortic valve replacement with a bioprosthetic valve, unroofing of myocardial bridge of the LAD with Dr. Wolf.  She also required a [employer] PPM with Dr. Gilmore. She had previously been seen by Dr. Washington locally and continues to follow with Dr. Bay.    She saw Dr. Bay in the office yesterday.  He encouraged her to come and see me today over complaints of a burning sensation in her chest wall and needing to be back on migraine prophylaxis.    She has apparently made her surgeon at [facility] aware of the burning sensation in her chest.  She states that she was on gabapentin while hospitalized and was not sent home with it.  She is interested in being back on this medication.  She sees Dr. Wolf again in mid August.  She states that she has discussed this issue over the phone recently with his nurse practitioner, Sara Rios.  It sounds like that she was told that this is normal and should dissipate over time.  I agree with that assessment.  However, the patient states that she cannot tolerate the issue any longer and wants to be back on gabapentin.  I will prescribe this for the short-term, but do not have plans to manage chronic chest wall pain should she develop it.    She also sees Dr. Tay for a history of migraine headaches.  She sees him in September.  She last saw him in March.  His note was reviewed.  She was set at that time to only be on Ubrelvy.  She has previously tried Qulipta that helped, but her insurance would not pay for it.  She had been on Emgality at one point.    She had previously used Imitrex, Topamax, and amitriptyline as well. She does not remember ever being on  propranolol.  She now has a pacemaker in place.  Feel that trialing propranolol would be reasonable.  She stated that Dr. Bay also wants to make sure that her blood pressure consistently runs less than 130 so this could help with that as well.  Hopefully, propranolol will be an easy solution for her.  If she has further migrainous problems in the near term, then she should consult with Dr. Tay before her appointment in September.    To keep regular follow-up at the start of October.      Follow Up   Return for Next scheduled follow up.  Patient was given instructions and counseling regarding her condition or for health maintenance advice. Please see specific information pulled into the AVS if appropriate.      HALLE Tidwell DO       Electronically signed by ELLEN Tidwell DO, 07/30/24, 8:20 AM CDT.

## 2024-08-12 DIAGNOSIS — M79.2 NEUROPATHIC PAIN OF CHEST: ICD-10-CM

## 2024-08-12 RX ORDER — GABAPENTIN 300 MG/1
300 CAPSULE ORAL 3 TIMES DAILY
Qty: 90 CAPSULE | Refills: 1 | Status: SHIPPED | OUTPATIENT
Start: 2024-08-12

## 2024-08-13 ENCOUNTER — TELEPHONE (OUTPATIENT)
Dept: CARDIOLOGY | Facility: CLINIC | Age: 59
End: 2024-08-13

## 2024-08-13 NOTE — TELEPHONE ENCOUNTER
The [facility] received a fax that requires your attention. The document has been indexed to the patient’s chart for your review.      Reason for sending: EXTERNAL MEDICAL RECORD NOTIFICATION     Documents Description: MEDICAL RECORDS FROM FACILITY, LISTED BELOW     Name of Sender: [facility] //[facility]     Date Indexed:   CARDIOTHORACIC SURGERY CONSULT 4.8.24  CARDIOTHORACIC SURGERY POST OP PN 8.12.24  OPERATIVE PROG NOTE 6.18.24

## 2024-08-16 ENCOUNTER — OFFICE VISIT (OUTPATIENT)
Dept: OTOLARYNGOLOGY | Facility: CLINIC | Age: 59
End: 2024-08-16
Payer: COMMERCIAL

## 2024-08-16 VITALS
HEIGHT: 66 IN | DIASTOLIC BLOOD PRESSURE: 59 MMHG | BODY MASS INDEX: 28.09 KG/M2 | TEMPERATURE: 98.6 F | WEIGHT: 174.8 LBS | HEART RATE: 79 BPM | SYSTOLIC BLOOD PRESSURE: 106 MMHG

## 2024-08-16 DIAGNOSIS — H61.23 BILATERAL IMPACTED CERUMEN: Primary | ICD-10-CM

## 2024-08-16 NOTE — PROGRESS NOTES
Ear Cerumen Removal    Date/Time: 8/16/2024 9:50 AM    Performed by: Ellen Soria APRN  Authorized by: Ellen Soria APRN  Consent: Verbal consent obtained.  Consent given by: patient  Patient identity confirmed: verbally with patient    Anesthesia:  Local Anesthetic: none  Location details: left ear and right ear  Patient tolerance: patient tolerated the procedure well with no immediate complications  Procedure type: instrumentation, curette

## 2024-08-22 ENCOUNTER — TELEPHONE (OUTPATIENT)
Dept: INTERNAL MEDICINE | Facility: CLINIC | Age: 59
End: 2024-08-22
Payer: COMMERCIAL

## 2024-08-22 DIAGNOSIS — F06.4 ANXIETY DISORDER DUE TO MEDICAL CONDITION: ICD-10-CM

## 2024-08-22 RX ORDER — PAROXETINE 10 MG/1
10 TABLET, FILM COATED ORAL EVERY MORNING
Qty: 90 TABLET | Refills: 3 | Status: SHIPPED | OUTPATIENT
Start: 2024-08-22

## 2024-08-22 RX ORDER — LEVOTHYROXINE SODIUM 0.07 MG/1
TABLET ORAL
Qty: 90 TABLET | Refills: 3 | Status: SHIPPED | OUTPATIENT
Start: 2024-08-22

## 2024-08-22 NOTE — TELEPHONE ENCOUNTER
Incision site is burning and she can't tolerate lidocaine patch on incision, requesting lidocaine cream instead of patch.

## 2024-08-22 NOTE — TELEPHONE ENCOUNTER
Patient states her incision site is burning and requesting lidocaine cream instead of patches.  I informed patient to contact her surgeon's office and she states she has been discharged from their care.

## 2024-08-22 NOTE — TELEPHONE ENCOUNTER
"Caller: Kelli Pedro \"Carli\"    Relationship: Self    Best call back number: [phone]     Requested Prescriptions:   Requested Prescriptions     Pending Prescriptions Disp Refills    levothyroxine (Synthroid) 75 MCG tablet 30 tablet 11     Sig: Take 1/2 tablet on Wednesdays. Skip Sunday dose. Take one tablet daily Vsjuxh-Xdlnbqa-Vrhulfov-Friday-Saturday.    PARoxetine (Paxil) 10 MG tablet 30 tablet 1     Sig: Take 1 tablet by mouth Every Morning.        Pharmacy where request should be sent:  [facility] [address] [phone] [facility] [phone]      Last office visit with prescribing clinician: 7/30/2024   Last telemedicine visit with prescribing clinician: Visit date not found   Next office visit with prescribing clinician: 10/2/2024     Additional details provided by patient:     Does the patient have less than a 3 day supply:  [x] Yes  [] No    Would you like a call back once the refill request has been completed: [x] Yes [] No    If the office needs to give you a call back, can they leave a voicemail: [] Yes [] No    Arturo Lam Rep   08/22/24 08:49 CDT             "

## 2024-08-23 RX ORDER — LIDOCAINE 40 MG/G
1 CREAM TOPICAL AS NEEDED
Qty: 120 G | Refills: 1 | Status: SHIPPED | OUTPATIENT
Start: 2024-08-23

## 2024-08-29 DIAGNOSIS — M94.0 COSTOCHONDRITIS: ICD-10-CM

## 2024-08-29 RX ORDER — LIDOCAINE 50 MG/G
1 PATCH TOPICAL EVERY 24 HOURS
Qty: 14 EACH | Refills: 1 | Status: SHIPPED | OUTPATIENT
Start: 2024-08-29

## 2024-08-29 NOTE — TELEPHONE ENCOUNTER
"Patient states she has been discharged by [place] doctor and needs us to refill these patches.     She has also reported this; \"Incision site is burning and she can't tolerate lidocaine patch on incision, requesting lidocaine cream instead of patch.\"       Cream was denied and now she is asking to continue the patches.    "

## 2024-09-03 ENCOUNTER — OFFICE VISIT (OUTPATIENT)
Dept: INTERNAL MEDICINE | Facility: CLINIC | Age: 59
End: 2024-09-03
Payer: COMMERCIAL

## 2024-09-03 VITALS
BODY MASS INDEX: 27.97 KG/M2 | DIASTOLIC BLOOD PRESSURE: 70 MMHG | WEIGHT: 174 LBS | HEIGHT: 66 IN | OXYGEN SATURATION: 98 % | TEMPERATURE: 96.8 F | HEART RATE: 63 BPM | SYSTOLIC BLOOD PRESSURE: 124 MMHG

## 2024-09-03 DIAGNOSIS — Z95.0 STATUS POST PLACEMENT OF CARDIAC PACEMAKER: ICD-10-CM

## 2024-09-03 DIAGNOSIS — I87.2 VENOUS INSUFFICIENCY: Primary | ICD-10-CM

## 2024-09-03 DIAGNOSIS — Z95.2 STATUS POST AORTIC VALVE REPLACEMENT: ICD-10-CM

## 2024-09-03 DIAGNOSIS — Z95.828 HISTORY OF AORTIC ARCH REPLACEMENT: ICD-10-CM

## 2024-09-03 DIAGNOSIS — I10 ESSENTIAL HYPERTENSION: ICD-10-CM

## 2024-09-03 PROCEDURE — 99213 OFFICE O/P EST LOW 20 MIN: CPT | Performed by: INTERNAL MEDICINE

## 2024-09-03 RX ORDER — LOSARTAN POTASSIUM 25 MG/1
12.5 TABLET ORAL DAILY
Qty: 45 TABLET | Refills: 1 | Status: SHIPPED | OUTPATIENT
Start: 2024-09-03

## 2024-09-03 NOTE — PROGRESS NOTES
"    Chief Complaint  Cellulitis (Bilateral cellulitis x 2-3 weeks.  Resolved over the weekend.  Patient has a picture on her phone.  )    Sarika Pedro presents to [facility]  History of Present Illness  See below.     Objective   Vital Signs:  /70 (BP Location: Right arm, Patient Position: Sitting, Cuff Size: Adult)   Pulse 63   Temp 96.8 °F (36 °C) (Temporal)   Ht 167.6 cm (65.98\")   Wt 78.9 kg (174 lb)   SpO2 98%   BMI 28.10 kg/m²   Estimated body mass index is 28.1 kg/m² as calculated from the following:    Height as of this encounter: 167.6 cm (65.98\").    Weight as of this encounter: 78.9 kg (174 lb).         Physical Exam  HENT:      Head: Normocephalic and atraumatic.   Eyes:      Conjunctiva/sclera: Conjunctivae normal.      Pupils: Pupils are equal, round, and reactive to light.   Cardiovascular:      Rate and Rhythm: Normal rate and regular rhythm.      Heart sounds: Normal heart sounds.   Pulmonary:      Effort: Pulmonary effort is normal. No respiratory distress.      Breath sounds: Normal breath sounds.   Musculoskeletal:         General: Swelling (trace) present.   Skin:     General: Skin is warm and dry.      Findings: No rash.      Comments: Spider veins and varicosities are scattered on the bilateral lower extremities.   Neurological:      General: No focal deficit present.      Mental Status: She is alert and oriented to person, place, and time.   Psychiatric:         Mood and Affect: Mood normal.         Behavior: Behavior normal.         Thought Content: Thought content normal.         Judgment: Judgment normal.        Result Review :  Nothing new to review.         Assessment and Plan   Diagnoses and all orders for this visit:    1. Venous insufficiency (Primary)  -     Compression Stockings    2. History of aortic arch replacement    3. Status post aortic valve replacement    4. Status post placement of cardiac pacemaker    5. " Essential hypertension  -     losartan (Cozaar) 25 MG tablet; Take 0.5 tablets by mouth Daily.  Dispense: 45 tablet; Refill: 1       Presents today with complaints of leg swelling.    She has sent a few [employer] messages about this.  I had asked her to check in with Dr. Bay because she was wanting more diuretics at one point.  I was worried that potentially she was having some new cardiac issues associated with this issue given her extensive surgical intervention in [place] a few months ago.  She states that he just referred her to vascular again.  She saw LAQUITA Douglas with Dr. Huynh on 8/27.  She states that there are plans to do some spider vein and varicosity injections at the end of this month.  She does relate that her edema is better with recumbency.  She is likely having more noticeable leg swelling recently because she is back at work at the [employer] and is up on her feet all day.  Again, she feels the edema is better in the mornings after sleeping overnight with her legs elevated.  She does wear compression stockings at times.  She has never been fitted for these so I have ordered compression stockings to be fitted at one of the local equipment companies.  There is nothing else on exam to suggest a more sinister process such as congestive heart failure.  There are no other congestive heart failure symptoms.    He showed me a picture of some redness around her right ankle on Friday.  She was worried that she might have cellulitis.  She does not have any signs of cellulitis on exam in the office today.  Feel that this may have been related to friction from her socks or compression stockings.  If this recurs, she will send a photo through [employer].    She gets concerned about her blood pressure recently.  It is 124/70 in the office today.  She states that at home she has seen some 130s and 140s at times.  We do plan to start her back on 12.5 mg of losartan.  She has been on this previously and  tolerated it without difficulty.  She states that her cardiothoracic surgeon in [place] wanted her blood pressure to be more around 110/70.    She states that overall she continues to feel better.  She and her  did some kayaking over the weekend.  She did not have any issues with doing so.     At this point in time, she has 2 upcoming appointments.  I asked her to cancel the 10/2 appointment and keep the 10/21 appointment.      Follow Up   Return for Next scheduled follow up; has a 10/2 and 10/21 so cancel 10/2 and keep 10/21.  Patient was given instructions and counseling regarding her condition or for health maintenance advice. Please see specific information pulled into the AVS if appropriate.      HALLE Tidwell DO       Electronically signed by ELLEN Tidwell DO, 09/03/24, 4:22 PM CDT.

## 2024-09-20 ENCOUNTER — TRANSCRIBE ORDERS (OUTPATIENT)
Dept: ADMINISTRATIVE | Facility: HOSPITAL | Age: 59
End: 2024-09-20
Payer: COMMERCIAL

## 2024-09-20 ENCOUNTER — LAB (OUTPATIENT)
Dept: LAB | Facility: HOSPITAL | Age: 59
End: 2024-09-20
Payer: COMMERCIAL

## 2024-09-20 DIAGNOSIS — E11.65 TYPE 2 DIABETES MELLITUS WITH HYPERGLYCEMIA, WITHOUT LONG-TERM CURRENT USE OF INSULIN: ICD-10-CM

## 2024-09-20 DIAGNOSIS — D50.9 IRON DEFICIENCY ANEMIA, UNSPECIFIED IRON DEFICIENCY ANEMIA TYPE: Primary | ICD-10-CM

## 2024-09-20 DIAGNOSIS — F51.04 PSYCHOPHYSIOLOGICAL INSOMNIA: ICD-10-CM

## 2024-09-20 DIAGNOSIS — E55.9 VITAMIN D DEFICIENCY, UNSPECIFIED: ICD-10-CM

## 2024-09-20 DIAGNOSIS — D69.6 THROMBOCYTOPENIA: ICD-10-CM

## 2024-09-20 DIAGNOSIS — E03.8 OTHER SPECIFIED HYPOTHYROIDISM: ICD-10-CM

## 2024-09-20 DIAGNOSIS — D50.9 IRON DEFICIENCY ANEMIA, UNSPECIFIED IRON DEFICIENCY ANEMIA TYPE: ICD-10-CM

## 2024-09-20 DIAGNOSIS — D72.819 LEUKOPENIA, UNSPECIFIED TYPE: ICD-10-CM

## 2024-09-20 DIAGNOSIS — E55.9 VITAMIN D DEFICIENCY, UNSPECIFIED: Primary | ICD-10-CM

## 2024-09-20 DIAGNOSIS — G43.109 MIGRAINE WITH AURA AND WITHOUT STATUS MIGRAINOSUS, NOT INTRACTABLE: ICD-10-CM

## 2024-09-20 LAB
25(OH)D3 SERPL-MCNC: 30.1 NG/ML (ref 30–100)
ALBUMIN SERPL-MCNC: 4.1 G/DL (ref 3.5–5.2)
ALBUMIN/GLOB SERPL: 1.5 G/DL
ALP SERPL-CCNC: 189 U/L (ref 39–117)
ALT SERPL W P-5'-P-CCNC: 23 U/L (ref 1–33)
ANION GAP SERPL CALCULATED.3IONS-SCNC: 8 MMOL/L (ref 5–15)
AST SERPL-CCNC: 29 U/L (ref 1–32)
BASOPHILS # BLD AUTO: 0.04 10*3/MM3 (ref 0–0.2)
BASOPHILS NFR BLD AUTO: 0.8 % (ref 0–1.5)
BILIRUB SERPL-MCNC: 0.3 MG/DL (ref 0–1.2)
BUN SERPL-MCNC: 17 MG/DL (ref 6–20)
BUN/CREAT SERPL: 27 (ref 7–25)
CALCIUM SPEC-SCNC: 8.8 MG/DL (ref 8.6–10.5)
CHLORIDE SERPL-SCNC: 103 MMOL/L (ref 98–107)
CO2 SERPL-SCNC: 29 MMOL/L (ref 22–29)
CREAT SERPL-MCNC: 0.63 MG/DL (ref 0.57–1)
DEPRECATED RDW RBC AUTO: 47.2 FL (ref 37–54)
EGFRCR SERPLBLD CKD-EPI 2021: 103 ML/MIN/1.73
EOSINOPHIL # BLD AUTO: 0.18 10*3/MM3 (ref 0–0.4)
EOSINOPHIL NFR BLD AUTO: 3.6 % (ref 0.3–6.2)
ERYTHROCYTE [DISTWIDTH] IN BLOOD BY AUTOMATED COUNT: 14.6 % (ref 12.3–15.4)
FERRITIN SERPL-MCNC: 14.48 NG/ML (ref 13–150)
GLOBULIN UR ELPH-MCNC: 2.7 GM/DL
GLUCOSE SERPL-MCNC: 76 MG/DL (ref 65–99)
HBA1C MFR BLD: 5.9 % (ref 4.8–5.6)
HCT VFR BLD AUTO: 38 % (ref 34–46.6)
HGB BLD-MCNC: 11.4 G/DL (ref 12–15.9)
IMM GRANULOCYTES # BLD AUTO: 0.01 10*3/MM3 (ref 0–0.05)
IMM GRANULOCYTES NFR BLD AUTO: 0.2 % (ref 0–0.5)
IRON 24H UR-MRATE: 40 MCG/DL (ref 37–145)
IRON SATN MFR SERPL: 9 % (ref 20–50)
LYMPHOCYTES # BLD AUTO: 1.26 10*3/MM3 (ref 0.7–3.1)
LYMPHOCYTES NFR BLD AUTO: 25.3 % (ref 19.6–45.3)
MCH RBC QN AUTO: 26.7 PG (ref 26.6–33)
MCHC RBC AUTO-ENTMCNC: 30 G/DL (ref 31.5–35.7)
MCV RBC AUTO: 89 FL (ref 79–97)
MONOCYTES # BLD AUTO: 0.42 10*3/MM3 (ref 0.1–0.9)
MONOCYTES NFR BLD AUTO: 8.4 % (ref 5–12)
NEUTROPHILS NFR BLD AUTO: 3.07 10*3/MM3 (ref 1.7–7)
NEUTROPHILS NFR BLD AUTO: 61.7 % (ref 42.7–76)
NRBC BLD AUTO-RTO: 0 /100 WBC (ref 0–0.2)
PLATELET # BLD AUTO: 238 10*3/MM3 (ref 140–450)
PMV BLD AUTO: 12.3 FL (ref 6–12)
POTASSIUM SERPL-SCNC: 3.3 MMOL/L (ref 3.5–5.2)
PROT SERPL-MCNC: 6.8 G/DL (ref 6–8.5)
RBC # BLD AUTO: 4.27 10*6/MM3 (ref 3.77–5.28)
SODIUM SERPL-SCNC: 140 MMOL/L (ref 136–145)
T4 FREE SERPL-MCNC: 1.11 NG/DL (ref 0.93–1.7)
TIBC SERPL-MCNC: 468 MCG/DL (ref 298–536)
TRANSFERRIN SERPL-MCNC: 314 MG/DL (ref 200–360)
TSH SERPL DL<=0.05 MIU/L-ACNC: 1.46 UIU/ML (ref 0.27–4.2)
WBC NRBC COR # BLD AUTO: 4.98 10*3/MM3 (ref 3.4–10.8)

## 2024-09-20 PROCEDURE — 36415 COLL VENOUS BLD VENIPUNCTURE: CPT

## 2024-09-20 PROCEDURE — 85025 COMPLETE CBC W/AUTO DIFF WBC: CPT

## 2024-09-20 PROCEDURE — 83036 HEMOGLOBIN GLYCOSYLATED A1C: CPT

## 2024-09-20 PROCEDURE — 84439 ASSAY OF FREE THYROXINE: CPT

## 2024-09-20 PROCEDURE — 83540 ASSAY OF IRON: CPT

## 2024-09-20 PROCEDURE — 82306 VITAMIN D 25 HYDROXY: CPT

## 2024-09-20 PROCEDURE — 80053 COMPREHEN METABOLIC PANEL: CPT

## 2024-09-20 PROCEDURE — 82728 ASSAY OF FERRITIN: CPT

## 2024-09-20 PROCEDURE — 84466 ASSAY OF TRANSFERRIN: CPT

## 2024-09-20 PROCEDURE — 84443 ASSAY THYROID STIM HORMONE: CPT

## 2024-09-20 RX ORDER — ESZOPICLONE 3 MG/1
3 TABLET, FILM COATED ORAL NIGHTLY
Qty: 90 TABLET | Refills: 1 | Status: SHIPPED | OUTPATIENT
Start: 2024-09-20

## 2024-09-20 RX ORDER — PROPRANOLOL HYDROCHLORIDE 10 MG/1
10 TABLET ORAL 2 TIMES DAILY
Qty: 180 TABLET | Refills: 2 | Status: SHIPPED | OUTPATIENT
Start: 2024-09-20

## 2024-09-20 RX ORDER — ESZOPICLONE 3 MG/1
3 TABLET, FILM COATED ORAL
Qty: 90 TABLET | Refills: 0 | OUTPATIENT
Start: 2024-09-20

## 2024-09-23 RX ORDER — HYDROCHLOROTHIAZIDE 25 MG/1
25 TABLET ORAL DAILY
Qty: 30 TABLET | Refills: 1 | Status: SHIPPED | OUTPATIENT
Start: 2024-09-23

## 2024-09-25 DIAGNOSIS — R11.0 NAUSEA: ICD-10-CM

## 2024-09-25 RX ORDER — ONDANSETRON 4 MG/1
TABLET, ORALLY DISINTEGRATING ORAL
Qty: 30 TABLET | Refills: 5 | Status: SHIPPED | OUTPATIENT
Start: 2024-09-25

## 2024-09-26 ENCOUNTER — OFFICE VISIT (OUTPATIENT)
Dept: ONCOLOGY | Facility: CLINIC | Age: 59
End: 2024-09-26
Payer: COMMERCIAL

## 2024-09-26 ENCOUNTER — CLINICAL SUPPORT (OUTPATIENT)
Dept: INTERNAL MEDICINE | Facility: CLINIC | Age: 59
End: 2024-09-26
Payer: COMMERCIAL

## 2024-09-26 VITALS
HEIGHT: 66 IN | WEIGHT: 180.1 LBS | BODY MASS INDEX: 28.94 KG/M2 | HEART RATE: 90 BPM | SYSTOLIC BLOOD PRESSURE: 118 MMHG | OXYGEN SATURATION: 100 % | DIASTOLIC BLOOD PRESSURE: 80 MMHG | TEMPERATURE: 97.4 F | RESPIRATION RATE: 16 BRPM

## 2024-09-26 DIAGNOSIS — Z23 NEED FOR PNEUMOCOCCAL VACCINE: ICD-10-CM

## 2024-09-26 DIAGNOSIS — D50.9 IRON DEFICIENCY ANEMIA, UNSPECIFIED IRON DEFICIENCY ANEMIA TYPE: Primary | ICD-10-CM

## 2024-09-26 DIAGNOSIS — D72.819 LEUKOPENIA, UNSPECIFIED TYPE: ICD-10-CM

## 2024-09-26 DIAGNOSIS — Z23 FLU VACCINE NEED: Primary | ICD-10-CM

## 2024-09-26 DIAGNOSIS — D69.6 THROMBOCYTOPENIA: ICD-10-CM

## 2024-09-26 PROCEDURE — 90471 IMMUNIZATION ADMIN: CPT | Performed by: INTERNAL MEDICINE

## 2024-09-26 PROCEDURE — 90472 IMMUNIZATION ADMIN EACH ADD: CPT | Performed by: INTERNAL MEDICINE

## 2024-09-26 PROCEDURE — 90656 IIV3 VACC NO PRSV 0.5 ML IM: CPT | Performed by: INTERNAL MEDICINE

## 2024-09-26 PROCEDURE — 90677 PCV20 VACCINE IM: CPT | Performed by: INTERNAL MEDICINE

## 2024-09-27 PROBLEM — D50.9 IRON DEFICIENCY ANEMIA: Status: ACTIVE | Noted: 2024-09-27

## 2024-09-30 RX ORDER — DIPHENHYDRAMINE HYDROCHLORIDE 50 MG/ML
50 INJECTION INTRAMUSCULAR; INTRAVENOUS AS NEEDED
OUTPATIENT
Start: 2024-10-08

## 2024-09-30 RX ORDER — ACETAMINOPHEN 325 MG/1
650 TABLET ORAL ONCE
OUTPATIENT
Start: 2024-10-08

## 2024-09-30 RX ORDER — SODIUM CHLORIDE 9 MG/ML
20 INJECTION, SOLUTION INTRAVENOUS ONCE
OUTPATIENT
Start: 2024-10-15

## 2024-09-30 RX ORDER — ACETAMINOPHEN 325 MG/1
650 TABLET ORAL ONCE
OUTPATIENT
Start: 2024-10-15

## 2024-09-30 RX ORDER — DIPHENHYDRAMINE HYDROCHLORIDE 50 MG/ML
50 INJECTION INTRAMUSCULAR; INTRAVENOUS AS NEEDED
OUTPATIENT
Start: 2024-10-15

## 2024-09-30 RX ORDER — FAMOTIDINE 10 MG/ML
20 INJECTION, SOLUTION INTRAVENOUS AS NEEDED
OUTPATIENT
Start: 2024-10-08

## 2024-09-30 RX ORDER — SODIUM CHLORIDE 9 MG/ML
20 INJECTION, SOLUTION INTRAVENOUS ONCE
OUTPATIENT
Start: 2024-10-08

## 2024-09-30 RX ORDER — FAMOTIDINE 10 MG/ML
20 INJECTION, SOLUTION INTRAVENOUS AS NEEDED
OUTPATIENT
Start: 2024-10-15

## 2024-10-03 ENCOUNTER — INFUSION (OUTPATIENT)
Dept: ONCOLOGY | Facility: HOSPITAL | Age: 59
End: 2024-10-03
Payer: COMMERCIAL

## 2024-10-03 VITALS
OXYGEN SATURATION: 95 % | HEIGHT: 66 IN | HEART RATE: 67 BPM | SYSTOLIC BLOOD PRESSURE: 85 MMHG | BODY MASS INDEX: 29.38 KG/M2 | RESPIRATION RATE: 18 BRPM | WEIGHT: 182.8 LBS | DIASTOLIC BLOOD PRESSURE: 58 MMHG | TEMPERATURE: 97.3 F

## 2024-10-03 DIAGNOSIS — D50.9 IRON DEFICIENCY ANEMIA, UNSPECIFIED IRON DEFICIENCY ANEMIA TYPE: Primary | ICD-10-CM

## 2024-10-03 PROCEDURE — 25810000003 SODIUM CHLORIDE 0.9 % SOLUTION: Performed by: NURSE PRACTITIONER

## 2024-10-03 PROCEDURE — 25010000002 NA FERRIC GLUC CPLX PER 12.5 MG: Performed by: NURSE PRACTITIONER

## 2024-10-03 PROCEDURE — 25010000002 DIPHENHYDRAMINE PER 50 MG: Performed by: NURSE PRACTITIONER

## 2024-10-03 PROCEDURE — 96367 TX/PROPH/DG ADDL SEQ IV INF: CPT

## 2024-10-03 PROCEDURE — 96365 THER/PROPH/DIAG IV INF INIT: CPT

## 2024-10-03 PROCEDURE — 96366 THER/PROPH/DIAG IV INF ADDON: CPT

## 2024-10-03 RX ORDER — FAMOTIDINE 10 MG/ML
20 INJECTION, SOLUTION INTRAVENOUS AS NEEDED
Status: DISCONTINUED | OUTPATIENT
Start: 2024-10-03 | End: 2024-10-03 | Stop reason: HOSPADM

## 2024-10-03 RX ORDER — ACETAMINOPHEN 325 MG/1
650 TABLET ORAL ONCE
Status: DISCONTINUED | OUTPATIENT
Start: 2024-10-03 | End: 2024-10-03 | Stop reason: HOSPADM

## 2024-10-03 RX ORDER — SODIUM CHLORIDE 9 MG/ML
20 INJECTION, SOLUTION INTRAVENOUS ONCE
Status: COMPLETED | OUTPATIENT
Start: 2024-10-03 | End: 2024-10-03

## 2024-10-03 RX ORDER — DIPHENHYDRAMINE HYDROCHLORIDE 50 MG/ML
50 INJECTION INTRAMUSCULAR; INTRAVENOUS AS NEEDED
Status: DISCONTINUED | OUTPATIENT
Start: 2024-10-03 | End: 2024-10-03 | Stop reason: HOSPADM

## 2024-10-03 RX ADMIN — SODIUM CHLORIDE 250 MG: 9 INJECTION, SOLUTION INTRAVENOUS at 12:11

## 2024-10-03 RX ADMIN — SODIUM CHLORIDE 20 ML/HR: 9 INJECTION, SOLUTION INTRAVENOUS at 11:42

## 2024-10-03 RX ADMIN — DIPHENHYDRAMINE HYDROCHLORIDE 25 MG: 50 INJECTION, SOLUTION INTRAMUSCULAR; INTRAVENOUS at 11:48

## 2024-10-05 DIAGNOSIS — M79.2 NEUROPATHIC PAIN OF CHEST: ICD-10-CM

## 2024-10-06 DIAGNOSIS — M79.2 NEUROPATHIC PAIN OF CHEST: ICD-10-CM

## 2024-10-07 ENCOUNTER — HOSPITAL ENCOUNTER (EMERGENCY)
Facility: HOSPITAL | Age: 59
Discharge: HOME | End: 2024-10-07
Attending: FAMILY MEDICINE | Admitting: FAMILY MEDICINE
Payer: COMMERCIAL

## 2024-10-07 ENCOUNTER — APPOINTMENT (OUTPATIENT)
Dept: CT IMAGING | Facility: HOSPITAL | Age: 59
End: 2024-10-07
Payer: COMMERCIAL

## 2024-10-07 ENCOUNTER — APPOINTMENT (OUTPATIENT)
Dept: GENERAL RADIOLOGY | Facility: HOSPITAL | Age: 59
End: 2024-10-07
Payer: COMMERCIAL

## 2024-10-07 VITALS
OXYGEN SATURATION: 100 % | BODY MASS INDEX: 29.28 KG/M2 | HEIGHT: 66 IN | TEMPERATURE: 98 F | WEIGHT: 182.2 LBS | SYSTOLIC BLOOD PRESSURE: 120 MMHG | DIASTOLIC BLOOD PRESSURE: 77 MMHG | HEART RATE: 60 BPM | RESPIRATION RATE: 18 BRPM

## 2024-10-07 DIAGNOSIS — M94.0 COSTOCHONDRITIS, ACUTE: Primary | ICD-10-CM

## 2024-10-07 DIAGNOSIS — R07.81 PLEURITIC CHEST PAIN: ICD-10-CM

## 2024-10-07 PROBLEM — R09.1 PLEURISY: Status: ACTIVE | Noted: 2024-10-07

## 2024-10-07 PROBLEM — R07.9 RIGHT-SIDED CHEST PAIN: Status: ACTIVE | Noted: 2024-10-07

## 2024-10-07 LAB
ALBUMIN SERPL-MCNC: 3.9 G/DL (ref 3.5–5.2)
ALBUMIN/GLOB SERPL: 1.3 G/DL
ALP SERPL-CCNC: 185 U/L (ref 39–117)
ALT SERPL W P-5'-P-CCNC: 51 U/L (ref 1–33)
ANION GAP SERPL CALCULATED.3IONS-SCNC: 6 MMOL/L (ref 5–15)
APTT PPP: 25.5 SECONDS (ref 24.5–36)
AST SERPL-CCNC: 70 U/L (ref 1–32)
BASOPHILS # BLD AUTO: 0.03 10*3/MM3 (ref 0–0.2)
BASOPHILS NFR BLD AUTO: 0.7 % (ref 0–1.5)
BILIRUB SERPL-MCNC: 0.3 MG/DL (ref 0–1.2)
BUN SERPL-MCNC: 18 MG/DL (ref 6–20)
BUN/CREAT SERPL: 30 (ref 7–25)
CALCIUM SPEC-SCNC: 9.1 MG/DL (ref 8.6–10.5)
CHLORIDE SERPL-SCNC: 103 MMOL/L (ref 98–107)
CO2 SERPL-SCNC: 28 MMOL/L (ref 22–29)
CREAT SERPL-MCNC: 0.6 MG/DL (ref 0.57–1)
DEPRECATED RDW RBC AUTO: 48.7 FL (ref 37–54)
EGFRCR SERPLBLD CKD-EPI 2021: 104.2 ML/MIN/1.73
EOSINOPHIL # BLD AUTO: 0.19 10*3/MM3 (ref 0–0.4)
EOSINOPHIL NFR BLD AUTO: 4.2 % (ref 0.3–6.2)
ERYTHROCYTE [DISTWIDTH] IN BLOOD BY AUTOMATED COUNT: 15.2 % (ref 12.3–15.4)
GLOBULIN UR ELPH-MCNC: 3 GM/DL
GLUCOSE SERPL-MCNC: 92 MG/DL (ref 65–99)
HCT VFR BLD AUTO: 38.8 % (ref 34–46.6)
HGB BLD-MCNC: 11.7 G/DL (ref 12–15.9)
IMM GRANULOCYTES # BLD AUTO: 0.01 10*3/MM3 (ref 0–0.05)
IMM GRANULOCYTES NFR BLD AUTO: 0.2 % (ref 0–0.5)
INR PPP: 0.86 (ref 0.91–1.09)
LYMPHOCYTES # BLD AUTO: 1.18 10*3/MM3 (ref 0.7–3.1)
LYMPHOCYTES NFR BLD AUTO: 26.2 % (ref 19.6–45.3)
MAGNESIUM SERPL-MCNC: 1.9 MG/DL (ref 1.6–2.6)
MCH RBC QN AUTO: 26.2 PG (ref 26.6–33)
MCHC RBC AUTO-ENTMCNC: 30.2 G/DL (ref 31.5–35.7)
MCV RBC AUTO: 87 FL (ref 79–97)
MONOCYTES # BLD AUTO: 0.39 10*3/MM3 (ref 0.1–0.9)
MONOCYTES NFR BLD AUTO: 8.7 % (ref 5–12)
NEUTROPHILS NFR BLD AUTO: 2.7 10*3/MM3 (ref 1.7–7)
NEUTROPHILS NFR BLD AUTO: 60 % (ref 42.7–76)
NRBC BLD AUTO-RTO: 0 /100 WBC (ref 0–0.2)
PLATELET # BLD AUTO: 241 10*3/MM3 (ref 140–450)
PMV BLD AUTO: 11.4 FL (ref 6–12)
POTASSIUM SERPL-SCNC: 3.9 MMOL/L (ref 3.5–5.2)
PROT SERPL-MCNC: 6.9 G/DL (ref 6–8.5)
PROTHROMBIN TIME: 12 SECONDS (ref 11.8–14.8)
RBC # BLD AUTO: 4.46 10*6/MM3 (ref 3.77–5.28)
SODIUM SERPL-SCNC: 137 MMOL/L (ref 136–145)
WBC NRBC COR # BLD AUTO: 4.5 10*3/MM3 (ref 3.4–10.8)

## 2024-10-07 PROCEDURE — 85610 PROTHROMBIN TIME: CPT | Performed by: FAMILY MEDICINE

## 2024-10-07 PROCEDURE — 96374 THER/PROPH/DIAG INJ IV PUSH: CPT

## 2024-10-07 PROCEDURE — 96375 TX/PRO/DX INJ NEW DRUG ADDON: CPT

## 2024-10-07 PROCEDURE — 83735 ASSAY OF MAGNESIUM: CPT | Performed by: FAMILY MEDICINE

## 2024-10-07 PROCEDURE — 25510000001 IOPAMIDOL PER 1 ML: Performed by: FAMILY MEDICINE

## 2024-10-07 PROCEDURE — 71046 X-RAY EXAM CHEST 2 VIEWS: CPT

## 2024-10-07 PROCEDURE — 71275 CT ANGIOGRAPHY CHEST: CPT

## 2024-10-07 PROCEDURE — 25010000002 KETOROLAC TROMETHAMINE PER 15 MG: Performed by: FAMILY MEDICINE

## 2024-10-07 PROCEDURE — 25010000002 DEXAMETHASONE PER 1 MG: Performed by: FAMILY MEDICINE

## 2024-10-07 PROCEDURE — 80053 COMPREHEN METABOLIC PANEL: CPT | Performed by: FAMILY MEDICINE

## 2024-10-07 PROCEDURE — 93005 ELECTROCARDIOGRAM TRACING: CPT | Performed by: FAMILY MEDICINE

## 2024-10-07 PROCEDURE — 99285 EMERGENCY DEPT VISIT HI MDM: CPT

## 2024-10-07 PROCEDURE — 85730 THROMBOPLASTIN TIME PARTIAL: CPT | Performed by: FAMILY MEDICINE

## 2024-10-07 PROCEDURE — 85025 COMPLETE CBC W/AUTO DIFF WBC: CPT | Performed by: FAMILY MEDICINE

## 2024-10-07 PROCEDURE — 93010 ELECTROCARDIOGRAM REPORT: CPT | Performed by: HOSPITALIST

## 2024-10-07 RX ORDER — DEXAMETHASONE SODIUM PHOSPHATE 10 MG/ML
10 INJECTION INTRAMUSCULAR; INTRAVENOUS ONCE
Status: COMPLETED | OUTPATIENT
Start: 2024-10-07 | End: 2024-10-07

## 2024-10-07 RX ORDER — GABAPENTIN 300 MG/1
300 CAPSULE ORAL 3 TIMES DAILY
Qty: 90 CAPSULE | Refills: 1 | OUTPATIENT
Start: 2024-10-07

## 2024-10-07 RX ORDER — KETOROLAC TROMETHAMINE 30 MG/ML
30 INJECTION, SOLUTION INTRAMUSCULAR; INTRAVENOUS ONCE
Status: COMPLETED | OUTPATIENT
Start: 2024-10-07 | End: 2024-10-07

## 2024-10-07 RX ORDER — IOPAMIDOL 755 MG/ML
100 INJECTION, SOLUTION INTRAVASCULAR
Status: COMPLETED | OUTPATIENT
Start: 2024-10-07 | End: 2024-10-07

## 2024-10-07 RX ORDER — GABAPENTIN 300 MG/1
300 CAPSULE ORAL 3 TIMES DAILY
Qty: 90 CAPSULE | Refills: 1 | Status: SHIPPED | OUTPATIENT
Start: 2024-10-11

## 2024-10-07 RX ORDER — ASPIRIN 81 MG/1
243 TABLET, CHEWABLE ORAL ONCE
Status: DISCONTINUED | OUTPATIENT
Start: 2024-10-07 | End: 2024-10-07 | Stop reason: HOSPADM

## 2024-10-07 RX ORDER — INDOMETHACIN 75 MG/1
75 CAPSULE, EXTENDED RELEASE ORAL 2 TIMES DAILY WITH MEALS
Qty: 40 CAPSULE | Refills: 0 | Status: SHIPPED | OUTPATIENT
Start: 2024-10-07 | End: 2024-10-07

## 2024-10-07 RX ADMIN — KETOROLAC TROMETHAMINE 30 MG: 30 INJECTION, SOLUTION INTRAMUSCULAR; INTRAVENOUS at 12:45

## 2024-10-07 RX ADMIN — DEXAMETHASONE SODIUM PHOSPHATE 10 MG: 10 INJECTION INTRAMUSCULAR; INTRAVENOUS at 12:48

## 2024-10-07 RX ADMIN — IOPAMIDOL 100 ML: 755 INJECTION, SOLUTION INTRAVENOUS at 11:49

## 2024-10-07 NOTE — ED PROVIDER NOTES
HPI:     Patient is a 58-year-old white female who has been having right sided chest pain radiating to her collarbone and her back for a week.  She states this off and on.  She states that the pain that is worse is 8 out of 10.  She decided to come in today and be seen in urgent care.  Urgent care did a chest x-ray which was normal but due to her symptoms felt that she needed further workup and sent her here to the emergency room.  Patient does have a past history of having a DVT in her legs.  She is currently on 81 mg aspirin daily.  Patient states that the pain is worse with deep breath and a cough.  Patient denies any headache or vision changes no diaphoresis.  Patient states that shortness of breath is worse with activity and creatinine increases pain.  No abdominal pain.    REVIEW OF SYSTEMS  CONSTITUTIONAL:  No complaints of fever, chills,or weakness  EYES:  No complaints of discharge   ENT: No complaints of sore throat or ear pain  CARDIOVASCULAR: Positive for right-sided chest pain radiating to the back and right collarbone   RESPIRATORY: Positive for shortness of breath and cough   GI:  No complaints of abdominal pain, nausea, vomiting, or diarrhea  MUSCULOSKELETAL:  No complaints of back pain  SKIN:  No complaints of rash  NEUROLOGIC:  No complaints of headache, focal weakness, or sensory changes  ENDOCRINE:  No complaints of polyuria or polydipsia  LYMPHATIC:  No complaints of swollen glands  GENITOURINARY: No complaints of urinary frequency or hematuria        PAST MEDICAL HISTORY  Past Medical History:   Diagnosis Date    AAA (abdominal aortic aneurysm)     Abnormal ECG 03/14/2023    Allergic     Anemia     Angina pectoris     Anxiety     Anxiety and depression 06/25/2024    Aortic valve replaced 07/29/2024    Arthritis     Asthma     Bicuspid aortic valve 03/14/2023    Cataract     Surgery 2022    Cholelithiasis     Chronic kidney disease     Clotting disorder     Seeing Leticia Flores at [facility]  currently    Colon polyp     Coronary artery disease     Deep vein thrombosis     Dental disease     Diabetes mellitus     Diverticulitis of colon     Diverticulosis     GERD (gastroesophageal reflux disease)     Headache     Heart murmur     Hernia     History of medical problems     Aortic aneurysms    HL (hearing loss)     Hypertension     Hyperthyroidism     Hypothyroid     Irritable bowel syndrome     Kidney stone     Previous haf lithotripsy    Low back pain     Lupus     Mitral valve prolapse     In the [facility] Dr. Claudio Gibson    Neuromuscular disorder     NSTEMI (non-ST elevated myocardial infarction) 12/26/2023    Obesity     Osteopenia     Pancreatitis     Peptic ulcer disease     Peptic ulceration     Pneumonia     RBBB 03/14/2023    Scoliosis     Sinusitis     Sjogren's disease     Sleep apnea     Years ago, but no longer have since Gastric Sleeve Surgery    Tinnitus     Ulcerative colitis     Urinary tract infection     Visual impairment        FAMILY HISTORY  Family History   Problem Relation Age of Onset    Breast cancer Mother 70    Diabetes Mother     Hearing loss Mother     Heart disease Mother     Hyperlipidemia Mother     Vision loss Mother     Clotting disorder Mother     Fainting Mother     Hypertension Mother     Cancer Mother     Migraines Mother     Inflammatory bowel disease Mother     Scleroderma Father     Diabetes Father     Hearing loss Father     Heart disease Father     Hyperlipidemia Father     Vision loss Father     Clotting disorder Father     Fainting Father     Heart attack Father     Hypertension Father     Cancer Father     Stroke Father     Heart valve disorder Father     Colon polyps Father     Inflammatory bowel disease Father     Ulcerative colitis Father     Diabetes Sister     Hyperlipidemia Sister     Vision loss Sister     Asthma Sister     Hypertension Sister     Thyroid disease Sister     Hyperlipidemia Brother     Vision loss Brother     Asthma  Brother     Hypertension Brother     Diabetes Brother     Early death Brother     Colon cancer Neg Hx        SOCIAL HISTORY  Social History     Socioeconomic History    Marital status:    Tobacco Use    Smoking status: Former     Current packs/day: 0.25     Average packs/day: 0.3 packs/day for 43.4 years (10.8 ttl pk-yrs)     Types: Cigarettes     Start date: 1/1/1991     Quit date: 1/1/1987     Passive exposure: Past    Smokeless tobacco: Never    Tobacco comments:     [id number] for 6 months only   Vaping Use    Vaping status: Never Used   Substance and Sexual Activity    Alcohol use: Not Currently     Comment: Socially very rare    Drug use: Never    Sexual activity: Yes     Partners: Male     Birth control/protection: None, Partner of same sex     Comment: Thats one male patner i live with       IMMUNIZATION HISTORY  Deferred to primary care physician.    SURGICAL HISTORY  Past Surgical History:   Procedure Laterality Date    ABDOMINAL AORTIC ANEURYSM REPAIR  06/18/2024    ABDOMINAL SURGERY      ANKLE SURGERY      ASCENDING ARCH/HEMIARCH REPLACEMENT  06/18/2024    BACK SURGERY      BARIATRIC SURGERY      CARDIAC CATHETERIZATION N/A 12/26/2023    Procedure: Left Heart Cath;  Surgeon: Pablito De Leon DO;  Location:  [facility] CATH INVASIVE LOC[facility];  Service: Cardiovascular;  Laterality: N/A;    CARDIAC CATHETERIZATION  12/26/2023    CARDIAC SURGERY  06/2024    CARDIAC VALVE REPLACEMENT  06/2024    CARPAL TUNNEL RELEASE Bilateral     CHOLECYSTECTOMY      COLONOSCOPY  04/24/2023    normal colon consistent with ibs    ENDOSCOPY  04/24/2023    large hitial hernia,chronic gastritis    EYE SURGERY  2022    Cataracts both eyes    FLEXIBLE SIGMOIDOSCOPY      FOOT SURGERY      GALLBLADDER SURGERY      HYSTERECTOMY      PACEMAKER IMPLANTATION  06/2024    SHOULDER SURGERY      SUBTOTAL HYSTERECTOMY      TUBAL ABDOMINAL LIGATION      UPPER GASTROINTESTINAL ENDOSCOPY      VEIN SURGERY      [facility]  [place]       CURRENT MEDICATIONS    Current Facility-Administered Medications:     aspirin chewable tablet 243 mg, 243 mg, Oral, Once, Moe Gardner Jr., MD    Current Outpatient Medications:     aspirin 81 MG chewable tablet, Chew 1 tablet Daily., Disp: , Rfl:     atorvastatin (LIPITOR) 40 MG tablet, Take 1 tablet by mouth Daily., Disp: , Rfl:     Continuous Glucose  (FreeStyle Aydin 2 Pulteney) device, Use 1 each Continuous., Disp: , Rfl:     Continuous Glucose Sensor (FreeStyle Aydin 2 Sensor) misc, Use 1 each Every 14 (Fourteen) Days., Disp: , Rfl:     eszopiclone (LUNESTA) 3 MG tablet, Take 1 tablet by mouth Every Night. Take immediately before bedtime, Disp: 90 tablet, Rfl: 1    fluticasone (FLONASE) 50 MCG/ACT nasal spray, 2 sprays into the nostril(s) as directed by provider Daily., Disp: 16 g, Rfl: 1    [START ON 10/11/2024] gabapentin (NEURONTIN) 300 MG capsule, Take 1 capsule by mouth 3 (Three) Times a Day., Disp: 90 capsule, Rfl: 1    Galcanezumab-gnlm (Emgality) 120 MG/ML solution prefilled syringe, Inject 1 mL under the skin into the appropriate area as directed Every 30 (Thirty) Days., Disp: 1 mL, Rfl: 5    hydroCHLOROthiazide 25 MG tablet, Take 1 tablet by mouth Daily., Disp: 30 tablet, Rfl: 1    hydrOXYzine (ATARAX) 10 MG tablet, Take 1 tablet by mouth 3 (Three) Times a Day As Needed for Itching., Disp: , Rfl:     ketorolac (TORADOL) 10 MG tablet, Take 1 tablet by mouth Every 6 (Six) Hours As Needed for Moderate Pain., Disp: 20 tablet, Rfl: 0    lansoprazole (PREVACID) 30 MG capsule, Take 1 capsule by mouth Daily., Disp: 90 capsule, Rfl: 3    leflunomide (ARAVA) 10 MG tablet, Take 1 tablet by mouth Every Other Day., Disp: , Rfl:     levothyroxine (Synthroid) 75 MCG tablet, Take 1/2 tablet on Wednesdays. Skip Sunday dose. Take one tablet daily Bdniuf-Fjahgxw-Mipejoir-Friday-Saturday., Disp: 90 tablet, Rfl: 3    lidocaine (LIDODERM) 5 %, Place 1 patch on the skin as directed by provider  "Daily. Remove & Discard patch within 12 hours or as directed by MD, Disp: 14 each, Rfl: 1    linaclotide (Linzess) 145 MCG capsule capsule, Take 1 capsule by mouth Every Morning Before Breakfast., Disp: 90 capsule, Rfl: 1    losartan (Cozaar) 25 MG tablet, Take 0.5 tablets by mouth Daily., Disp: 45 tablet, Rfl: 1    magnesium oxide (MAG-OX) 400 MG tablet, Take 1 tablet by mouth Daily., Disp: 90 tablet, Rfl: 1    metaxalone (SKELAXIN) 800 MG tablet, Take 1 tablet by mouth 3 (Three) Times a Day As Needed for Muscle Spasms., Disp: 90 tablet, Rfl: 5    Narcan 4 MG/0.1ML nasal spray, Administer 1 spray into the nostril(s) as directed by provider As Needed., Disp: , Rfl:     nitroglycerin (NITROSTAT) 0.4 MG SL tablet, Place 1 tablet under the tongue Every 5 (Five) Minutes As Needed for Chest Pain. Take no more than 3 doses in 15 minutes., Disp: 100 tablet, Rfl: 2    ondansetron ODT (ZOFRAN-ODT) 4 MG disintegrating tablet, PLACE ONE TABLET ON THE TOP OF TONGUE EVERY 8 HOURS AS NEEDED FOR NAUSEA OR FOR VOMITING, Disp: 30 tablet, Rfl: 5    PARoxetine (Paxil) 10 MG tablet, Take 1 tablet by mouth Every Morning., Disp: 90 tablet, Rfl: 3    pilocarpine (SALAGEN) 5 MG tablet, Take 1 tablet by mouth 3 (Three) Times a Day., Disp: , Rfl:     potassium chloride (K-DUR,KLOR-CON) 10 MEQ CR tablet, Take 1 tablet by mouth 2 (Two) Times a Day., Disp: , Rfl:     propranolol (INDERAL) 10 MG tablet, Take 1 tablet by mouth 2 (Two) Times a Day., Disp: 180 tablet, Rfl: 2    ubrogepant (UBRELVY) 100 MG tablet, Take 1 tablet at the onset of headache. May repeat dose x1 in 2 hours if headache is not resolved. Max dose 200 mg/24 hours., Disp: 30 tablet, Rfl: 2    ALLERGIES  Allergies   Allergen Reactions    Clavulanic Acid Nausea And Vomiting    Gadolinium Other (See Comments)     \"passed out and was shaking all over. Had to spend the night at hospital\"  NAUSEA/POS SYNCOPE  \"passed out and was shaking all over. Had to spend the night at " "hospital\"  NAUSEA/POS SYNCOPE  \"passed out and was shaking all over. Had to spend the night at hospital\"  \"passed out and was shaking all over. Had to spend the night at hospital\"  NAUSEA/POS SYNCOPE  NAUSEA/POS SYNCOPE      Verapamil Hcl Er Anaphylaxis    Zoster Vac Recomb Adjuvanted Swelling    Flagyl [Metronidazole] Diarrhea     Abdominal pain     Estradiol Rash    Hydroxychloroquine GI Intolerance     Abdominal pain     Metformin Unknown - Low Severity    Prednisone Other (See Comments)     Increase in BP    Protonix [Pantoprazole] Nausea Only and Myalgia    Tetracyclines & Related Rash    Trazodone Itching, Other (See Comments) and Unknown (See Comments)     Blurred vision  Blurred vision  Blurred vision  Blurred vision             Cardiac exam    VITAL SIGNS:  /85   Pulse 61   Temp 97.8 °F (36.6 °C) (Oral)   Resp 11   Ht 167.6 cm (66\")   Wt 82.6 kg (182 lb 3.2 oz)   SpO2 96%   BMI 29.41 kg/m²     Constitutional: Patient is alert and in no distress.  Patient with moderate right-sided chest discomfort.    ENT: There is a normal pharynx with no acute erythema or exudate and oral mucosa is moist.  Nose is clear with no drainage.  Tympanic membranes intact and nonerythemic    Respiratory: Patient is clear to auscultation bilaterally with no wheezing or rhonchi.  Chest wall is mildly tender.  There are no external lesions on the chest.  There is no crepitance    Cardiovascular: S1-S2 regular rate and rhythm with a diastolic murmur 1 out of 6.    Abdomen: Soft, nontender, and  bowel sounds are normal in all 4 quadrants.  There is no rebound or guarding noted.  There is no abdominal distention or hepatosplenomegaly.    Genitourinary: Patient is voiding appropriately.    Integument: No acute lesions noted and color appears to be normal.    Eunice Coma Scale: Total score 15    Neurological: Patient is alert and oriented x4 and no acute findings noted.  Speech is fluent and cognition is normal.  No evidence " of acute CVA.  Cranial nerves II through XII intact.  Patient with normal motor function as well as reflexes and sensation      RADIOLOGY/PROCEDURES      CT Angiogram Chest   Final Result   1. No acute abnormality of the chest to account for patient's symptoms.   2. Atheromatous disease of the thoracic aorta. No evidence of aneurysmal   dilatation. The aortic lumen is normal and the previously seen   dilatation of the ascending aorta is not visualized in this study. There   is interval placement of aortic valvular prosthesis.   3. The lungs are unremarkable.                                                           This report was signed and finalized on 10/7/2024 12:07 PM by Dr. Satya Munoz MD.                 FUTURE APPOINTMENTS     Future Appointments   Date Time Provider Department Center   10/8/2024 11:30 AM CHAIR 06 [facility] OP INFU ONC [facility]   10/15/2024  7:30 AM CHAIR 06 [facility] OP INFU ONC [facility]   10/21/2024  8:30 AM ELLEN Tidwell DO [facility] PC VSQ [facility]   11/19/2024  8:00 AM [facility] DEVICE CHECK [facility]   11/19/2024  8:30 AM Chandni Huynh APRN [facility] CD  [facility]   12/31/2024  8:45 AM Leticia Flores APRN [facility] ONC [facility]   2/17/2025  9:30 AM Ellen Soria APRN [facility] ENT [facility]   7/28/2025  8:00 AM Ciera Martin APRN [facility] POD [facility]          EKG (reviewed and interpreted by me): AV paced rhythm at a ventricular rate of 60.  No acute ST segment elevation depression noted        HEART SCORE     Patient history  1      Slightly suspicious (0 points)  2   ECG        Nonspecific repolarization disturbance (1 point)    3   Patient age    ts)   Between 45 and 65 (1 point)    4   Risk factors (Hypercholesterolemia, Hypertension, diabetes, smoking, obesity)     More than 3 risk factors or atherosclerosis history (2 points)     5   Troponin       Less than normal limit (0 points)     6     TOTAL RISK NUMBER: 4    The three risk categories are described  below:  Heart score MACE risk Recommendation  0 - 3 Low (1.7%) Discharge can be an option.  4 - 6 Intermediate (20.3%) Clinical observation and further investigations.  7 - 10 High (72.2%) Immediate invasive treatment        COURSE & MEDICAL DECISION MAKING       Patient's partial differential diagnosis can include:    pulmonary embolism,Unstable angina, angina, non-STEMI, arrhythmia, costochondritis pleurisy, pneumothorax,  electrolyte abnormality,  Prinzmetal angina, pleural effusion, pulmonary edema, panic attack, esophageal spasm, GERD, gastritis, chest spasms, and others    Discussed with the patient results of the laboratory radiological test.    Patient's level of risk: Low        CRITICAL CARE    CRITICAL CARE: no   CRITICAL CARE TIME: none      Recent Results (from the past 24 hour(s))   ECG 12 Lead Chest Pain    Collection Time: 10/07/24  9:44 AM   Result Value Ref Range    QT Interval 504 ms    QTC Interval 504 ms   Comprehensive Metabolic Panel    Collection Time: 10/07/24 10:46 AM    Specimen: Blood   Result Value Ref Range    Glucose 92 65 - 99 mg/dL    BUN 18 6 - 20 mg/dL    Creatinine 0.60 0.57 - 1.00 mg/dL    Sodium 137 136 - 145 mmol/L    Potassium 3.9 3.5 - 5.2 mmol/L    Chloride 103 98 - 107 mmol/L    CO2 28.0 22.0 - 29.0 mmol/L    Calcium 9.1 8.6 - 10.5 mg/dL    Total Protein 6.9 6.0 - 8.5 g/dL    Albumin 3.9 3.5 - 5.2 g/dL    ALT (SGPT) 51 (H) 1 - 33 U/L    AST (SGOT) 70 (H) 1 - 32 U/L    Alkaline Phosphatase 185 (H) 39 - 117 U/L    Total Bilirubin 0.3 0.0 - 1.2 mg/dL    Globulin 3.0 gm/dL    A/G Ratio 1.3 g/dL    BUN/Creatinine Ratio 30.0 (H) 7.0 - 25.0    Anion Gap 6.0 5.0 - 15.0 mmol/L    eGFR 104.2 >60.0 mL/min/1.73   Protime-INR    Collection Time: 10/07/24 10:46 AM    Specimen: Blood   Result Value Ref Range    Protime 12.0 11.8 - 14.8 Seconds    INR 0.86 (L) 0.91 - 1.09   aPTT    Collection Time: 10/07/24 10:46 AM    Specimen: Blood   Result Value Ref Range    PTT 25.5 24.5 - 36.0  seconds   Magnesium    Collection Time: 10/07/24 10:46 AM    Specimen: Blood   Result Value Ref Range    Magnesium 1.9 1.6 - 2.6 mg/dL   CBC Auto Differential    Collection Time: 10/07/24 10:46 AM    Specimen: Blood   Result Value Ref Range    WBC 4.50 3.40 - 10.80 10*3/mm3    RBC 4.46 3.77 - 5.28 10*6/mm3    Hemoglobin 11.7 (L) 12.0 - 15.9 g/dL    Hematocrit 38.8 34.0 - 46.6 %    MCV 87.0 79.0 - 97.0 fL    MCH 26.2 (L) 26.6 - 33.0 pg    MCHC 30.2 (L) 31.5 - 35.7 g/dL    RDW 15.2 12.3 - 15.4 %    RDW-SD 48.7 37.0 - 54.0 fl    MPV 11.4 6.0 - 12.0 fL    Platelets 241 140 - 450 10*3/mm3    Neutrophil % 60.0 42.7 - 76.0 %    Lymphocyte % 26.2 19.6 - 45.3 %    Monocyte % 8.7 5.0 - 12.0 %    Eosinophil % 4.2 0.3 - 6.2 %    Basophil % 0.7 0.0 - 1.5 %    Immature Grans % 0.2 0.0 - 0.5 %    Neutrophils, Absolute 2.70 1.70 - 7.00 10*3/mm3    Lymphocytes, Absolute 1.18 0.70 - 3.10 10*3/mm3    Monocytes, Absolute 0.39 0.10 - 0.90 10*3/mm3    Eosinophils, Absolute 0.19 0.00 - 0.40 10*3/mm3    Basophils, Absolute 0.03 0.00 - 0.20 10*3/mm3    Immature Grans, Absolute 0.01 0.00 - 0.05 10*3/mm3    nRBC 0.0 0.0 - 0.2 /100 WBC         Old charts were reviewed per [facility] EMR.  Pertinent details are summarized above.  All laboratory, radiologic, and EKG studies that were performed in the Emergency Department were a necessary part of the evaluation needed to exclude unstable or  emergent medical conditions.     Patient was hemodynamically and neurologically stable in the ED.   Pertinent studies were reviewed as above.     The patient received:  Medications   aspirin chewable tablet 243 mg (243 mg Oral Not Given 10/7/24 1041)   iopamidol (ISOVUE-370) 76 % injection 100 mL (100 mL Intravenous Given 10/7/24 1149)   ketorolac (TORADOL) injection 30 mg (30 mg Intravenous Given 10/7/24 1245)   dexAMETHasone (DECADRON) injection 10 mg (10 mg Intravenous Given 10/7/24 1248)            ED Disposition       ED Disposition   Discharge    Condition    Stable    Comment   --                 Dragon disclaimer:  Part of this note may be an electronic transcription/translation of spoken language to printed text using the Dragon Dictation System.    I have reviewed the patient’s prescription history via a prescription monitoring program.  This information is consistent with my knowledge of the patient’s controlled substance use history.    Patient evaluated during Coronavirus Pandemic. Isolation practices followed according to [facility] policy.     FINAL IMPRESSION   Diagnosis Plan   1. Costochondritis, acute        2. Pleuritic chest pain              MD Jj Jimenez Jr, Thomas Mark Jr., MD  10/07/24 9650

## 2024-10-08 ENCOUNTER — INFUSION (OUTPATIENT)
Dept: ONCOLOGY | Facility: HOSPITAL | Age: 59
End: 2024-10-08
Payer: COMMERCIAL

## 2024-10-08 VITALS
WEIGHT: 183 LBS | HEIGHT: 66 IN | SYSTOLIC BLOOD PRESSURE: 93 MMHG | TEMPERATURE: 97.8 F | DIASTOLIC BLOOD PRESSURE: 56 MMHG | HEART RATE: 78 BPM | OXYGEN SATURATION: 99 % | RESPIRATION RATE: 20 BRPM | BODY MASS INDEX: 29.41 KG/M2

## 2024-10-08 DIAGNOSIS — D50.9 IRON DEFICIENCY ANEMIA, UNSPECIFIED IRON DEFICIENCY ANEMIA TYPE: Primary | ICD-10-CM

## 2024-10-08 LAB
QT INTERVAL: 504 MS
QTC INTERVAL: 504 MS

## 2024-10-08 PROCEDURE — 96365 THER/PROPH/DIAG IV INF INIT: CPT

## 2024-10-08 PROCEDURE — 25810000003 SODIUM CHLORIDE 0.9 % SOLUTION: Performed by: NURSE PRACTITIONER

## 2024-10-08 PROCEDURE — 96375 TX/PRO/DX INJ NEW DRUG ADDON: CPT

## 2024-10-08 PROCEDURE — 25010000002 DIPHENHYDRAMINE PER 50 MG: Performed by: NURSE PRACTITIONER

## 2024-10-08 PROCEDURE — 96366 THER/PROPH/DIAG IV INF ADDON: CPT

## 2024-10-08 PROCEDURE — 25010000002 NA FERRIC GLUC CPLX PER 12.5 MG: Performed by: NURSE PRACTITIONER

## 2024-10-08 RX ORDER — DIPHENHYDRAMINE HYDROCHLORIDE 50 MG/ML
25 INJECTION INTRAMUSCULAR; INTRAVENOUS ONCE
Status: COMPLETED | OUTPATIENT
Start: 2024-10-08 | End: 2024-10-08

## 2024-10-08 RX ORDER — DIPHENHYDRAMINE HYDROCHLORIDE 50 MG/ML
50 INJECTION INTRAMUSCULAR; INTRAVENOUS AS NEEDED
Status: DISCONTINUED | OUTPATIENT
Start: 2024-10-08 | End: 2024-10-08 | Stop reason: HOSPADM

## 2024-10-08 RX ORDER — FAMOTIDINE 10 MG/ML
20 INJECTION, SOLUTION INTRAVENOUS AS NEEDED
Status: DISCONTINUED | OUTPATIENT
Start: 2024-10-08 | End: 2024-10-08 | Stop reason: HOSPADM

## 2024-10-08 RX ORDER — SODIUM CHLORIDE 9 MG/ML
20 INJECTION, SOLUTION INTRAVENOUS ONCE
Status: COMPLETED | OUTPATIENT
Start: 2024-10-08 | End: 2024-10-08

## 2024-10-08 RX ORDER — DIPHENHYDRAMINE HYDROCHLORIDE 50 MG/ML
25 INJECTION INTRAMUSCULAR; INTRAVENOUS ONCE
Start: 2024-10-15 | End: 2024-10-15

## 2024-10-08 RX ORDER — ACETAMINOPHEN 325 MG/1
650 TABLET ORAL ONCE
Status: DISCONTINUED | OUTPATIENT
Start: 2024-10-08 | End: 2024-10-08 | Stop reason: HOSPADM

## 2024-10-08 RX ADMIN — DIPHENHYDRAMINE HYDROCHLORIDE 25 MG: 50 INJECTION, SOLUTION INTRAMUSCULAR; INTRAVENOUS at 11:29

## 2024-10-08 RX ADMIN — SODIUM CHLORIDE 250 MG: 9 INJECTION, SOLUTION INTRAVENOUS at 11:31

## 2024-10-08 RX ADMIN — SODIUM CHLORIDE 20 ML/HR: 9 INJECTION, SOLUTION INTRAVENOUS at 11:29

## 2024-10-09 DIAGNOSIS — R11.0 NAUSEA: ICD-10-CM

## 2024-10-09 DIAGNOSIS — G43.701 CHRONIC MIGRAINE WITHOUT AURA WITH STATUS MIGRAINOSUS, NOT INTRACTABLE: ICD-10-CM

## 2024-10-09 DIAGNOSIS — M94.0 COSTOCHONDRITIS: ICD-10-CM

## 2024-10-09 RX ORDER — GALCANEZUMAB 120 MG/ML
1 INJECTION, SOLUTION SUBCUTANEOUS
Qty: 1 ML | Refills: 5 | Status: SHIPPED | OUTPATIENT
Start: 2024-10-20

## 2024-10-09 RX ORDER — LIDOCAINE 50 MG/G
1 PATCH TOPICAL EVERY 24 HOURS
Qty: 14 EACH | Refills: 1 | Status: SHIPPED | OUTPATIENT
Start: 2024-10-21

## 2024-10-09 RX ORDER — ONDANSETRON 4 MG/1
TABLET, ORALLY DISINTEGRATING ORAL
Qty: 30 TABLET | Refills: 5 | OUTPATIENT
Start: 2024-10-09

## 2024-10-15 ENCOUNTER — INFUSION (OUTPATIENT)
Dept: ONCOLOGY | Facility: HOSPITAL | Age: 59
End: 2024-10-15
Payer: COMMERCIAL

## 2024-10-15 VITALS
SYSTOLIC BLOOD PRESSURE: 82 MMHG | HEIGHT: 66 IN | OXYGEN SATURATION: 97 % | BODY MASS INDEX: 30.28 KG/M2 | HEART RATE: 66 BPM | DIASTOLIC BLOOD PRESSURE: 45 MMHG | RESPIRATION RATE: 18 BRPM | TEMPERATURE: 97.2 F | WEIGHT: 188.4 LBS

## 2024-10-15 DIAGNOSIS — D50.9 IRON DEFICIENCY ANEMIA, UNSPECIFIED IRON DEFICIENCY ANEMIA TYPE: Primary | ICD-10-CM

## 2024-10-15 DIAGNOSIS — M94.0 COSTOCHONDRITIS: ICD-10-CM

## 2024-10-15 DIAGNOSIS — R60.0 PEDAL EDEMA: ICD-10-CM

## 2024-10-15 DIAGNOSIS — G43.701 CHRONIC MIGRAINE WITHOUT AURA WITH STATUS MIGRAINOSUS, NOT INTRACTABLE: ICD-10-CM

## 2024-10-15 DIAGNOSIS — R11.0 NAUSEA: ICD-10-CM

## 2024-10-15 PROCEDURE — 25810000003 SODIUM CHLORIDE 0.9 % SOLUTION: Performed by: NURSE PRACTITIONER

## 2024-10-15 PROCEDURE — 96366 THER/PROPH/DIAG IV INF ADDON: CPT

## 2024-10-15 PROCEDURE — 96365 THER/PROPH/DIAG IV INF INIT: CPT

## 2024-10-15 PROCEDURE — 25010000002 NA FERRIC GLUC CPLX PER 12.5 MG: Performed by: NURSE PRACTITIONER

## 2024-10-15 PROCEDURE — 63710000001 DIPHENHYDRAMINE PER 50 MG: Performed by: NURSE PRACTITIONER

## 2024-10-15 RX ORDER — DIPHENHYDRAMINE HYDROCHLORIDE 50 MG/ML
25 INJECTION INTRAMUSCULAR; INTRAVENOUS ONCE
Status: DISCONTINUED | OUTPATIENT
Start: 2024-10-15 | End: 2024-10-15 | Stop reason: HOSPADM

## 2024-10-15 RX ORDER — GALCANEZUMAB 120 MG/ML
1 INJECTION, SOLUTION SUBCUTANEOUS
Qty: 1 ML | Refills: 5 | OUTPATIENT
Start: 2024-10-15

## 2024-10-15 RX ORDER — DIPHENHYDRAMINE HCL 25 MG
25 CAPSULE ORAL ONCE
Status: COMPLETED | OUTPATIENT
Start: 2024-10-15 | End: 2024-10-15

## 2024-10-15 RX ORDER — ACETAMINOPHEN 325 MG/1
650 TABLET ORAL ONCE
Status: DISCONTINUED | OUTPATIENT
Start: 2024-10-15 | End: 2024-10-15 | Stop reason: HOSPADM

## 2024-10-15 RX ORDER — SODIUM CHLORIDE 9 MG/ML
20 INJECTION, SOLUTION INTRAVENOUS ONCE
Status: DISCONTINUED | OUTPATIENT
Start: 2024-10-15 | End: 2024-10-15 | Stop reason: HOSPADM

## 2024-10-15 RX ORDER — FAMOTIDINE 10 MG/ML
20 INJECTION, SOLUTION INTRAVENOUS AS NEEDED
Status: DISCONTINUED | OUTPATIENT
Start: 2024-10-15 | End: 2024-10-15 | Stop reason: HOSPADM

## 2024-10-15 RX ORDER — DIPHENHYDRAMINE HYDROCHLORIDE 50 MG/ML
50 INJECTION INTRAMUSCULAR; INTRAVENOUS AS NEEDED
Status: DISCONTINUED | OUTPATIENT
Start: 2024-10-15 | End: 2024-10-15 | Stop reason: HOSPADM

## 2024-10-15 RX ADMIN — DIPHENHYDRAMINE HYDROCHLORIDE 25 MG: 25 CAPSULE ORAL at 08:03

## 2024-10-15 RX ADMIN — SODIUM CHLORIDE 250 MG: 9 INJECTION, SOLUTION INTRAVENOUS at 08:24

## 2024-10-15 NOTE — TELEPHONE ENCOUNTER
"    Caller: Kelli Pedro \"Carli\"    Relationship: Self    Best call back number: [phone]     Requested Prescriptions:   Requested Prescriptions     Pending Prescriptions Disp Refills    ondansetron ODT (ZOFRAN-ODT) 4 MG disintegrating tablet 30 tablet 5    Galcanezumab-gnlm (Emgality) 120 MG/ML solution prefilled syringe 1 mL 5     Sig: Inject 1 mL under the skin into the appropriate area as directed Every 30 (Thirty) Days.    lidocaine (LIDODERM) 5 % 14 each 1     Sig: Place 1 patch on the skin as directed by provider Daily. Remove & Discard patch within 12 hours or as directed by MD        Pharmacy where request should be sent: [facility] - [address] [phone] [facility] [phone]      Last office visit with prescribing clinician: 9/3/2024   Last telemedicine visit with prescribing clinician: Visit date not found   Next office visit with prescribing clinician: 10/21/2024     Additional details provided by patient: PHARM NEEDS REFILLS SENT OVER. THE Emgality NEEDS A PRIOR AUTH    Does the patient have less than a 3 day supply:  [x] Yes  [] No    Would you like a call back once the refill request has been completed: [] Yes [x] No    If the office needs to give you a call back, can they leave a voicemail: [] Yes [x] No    Arturo Vela Rep   10/15/24 16:47 CDT         "

## 2024-10-16 ENCOUNTER — NURSE TRIAGE (OUTPATIENT)
Dept: CALL CENTER | Facility: HOSPITAL | Age: 59
End: 2024-10-16
Payer: COMMERCIAL

## 2024-10-16 ENCOUNTER — TELEPHONE (OUTPATIENT)
Dept: INTERNAL MEDICINE | Facility: CLINIC | Age: 59
End: 2024-10-16

## 2024-10-16 DIAGNOSIS — M94.0 COSTOCHONDRITIS: ICD-10-CM

## 2024-10-16 RX ORDER — LIDOCAINE 50 MG/G
PATCH TOPICAL
Qty: 30 PATCH | Refills: 1 | OUTPATIENT
Start: 2024-10-16

## 2024-10-16 RX ORDER — FUROSEMIDE 40 MG
40 TABLET ORAL DAILY
OUTPATIENT
Start: 2024-10-16

## 2024-10-16 RX ORDER — METHYLPREDNISOLONE 4 MG
TABLET, DOSE PACK ORAL
Qty: 21 TABLET | Refills: 0 | Status: SHIPPED | OUTPATIENT
Start: 2024-10-17

## 2024-10-16 RX ORDER — ONDANSETRON 4 MG/1
TABLET, ORALLY DISINTEGRATING ORAL
Qty: 30 TABLET | Refills: 5 | OUTPATIENT
Start: 2024-10-16

## 2024-10-16 RX ORDER — LIDOCAINE 50 MG/G
1 PATCH TOPICAL EVERY 24 HOURS
Qty: 14 EACH | Refills: 1 | OUTPATIENT
Start: 2024-10-21

## 2024-10-16 RX ORDER — GALCANEZUMAB 120 MG/ML
1 INJECTION, SOLUTION SUBCUTANEOUS
Qty: 1 ML | Refills: 5 | OUTPATIENT
Start: 2024-10-20

## 2024-10-16 NOTE — TELEPHONE ENCOUNTER
Patient called refused to talk to HUB or Nurse Triage. Would only speak to Dr. Tidwell's MA. She said she has an ongoing conversation going on with Dr. Tidwell. Please call patient back.

## 2024-10-16 NOTE — TELEPHONE ENCOUNTER
"HUB call Unable to reach office.    She does not need a triage nurse  She has an ongoing conversation with Dr Tidwell She has been talking back and forth with Lillianaelizabeth Persaud transfer to [facility]. Spoke with Cedric.  Reason for Disposition   [1] Caller requests to speak ONLY to PCP AND [2] URGENT question    Additional Information   Negative: Lab calling with strep throat test results and triager can call in prescription   Negative: Lab calling with urinalysis test results and triager can call in prescription   Negative: Medication questions   Negative: Medication renewal and refill questions   Negative: Pre-operative or pre-procedural questions   Negative: ED call to PCP (i.e., primary care provider; doctor, NP, or PA)   Negative: Doctor (or NP/PA) call to PCP   Negative: Call about patient who is currently hospitalized   Negative: Lab or radiology calling with CRITICAL test results   Negative: [1] Follow-up call from patient regarding patient's clinical status AND [2] information urgent    Answer Assessment - Initial Assessment Questions  1. REASON FOR CALL or QUESTION: \"What is your reason for calling today?\" or \"How can I best  help you?\" or \"What question do you have that I can help answer?\"    See note  2. CALLER: Document the source of call. (e.g., laboratory, patient).      *No Answer*    Protocols used: PCP Call - No Triage-ADULT-    "

## 2024-10-18 DIAGNOSIS — G43.109 MIGRAINE WITH AURA AND WITHOUT STATUS MIGRAINOSUS, NOT INTRACTABLE: ICD-10-CM

## 2024-10-18 DIAGNOSIS — R60.0 PEDAL EDEMA: ICD-10-CM

## 2024-10-18 DIAGNOSIS — F51.04 PSYCHOPHYSIOLOGICAL INSOMNIA: ICD-10-CM

## 2024-10-18 DIAGNOSIS — F06.4 ANXIETY DISORDER DUE TO MEDICAL CONDITION: ICD-10-CM

## 2024-10-18 RX ORDER — PROPRANOLOL HCL 10 MG
10 TABLET ORAL 2 TIMES DAILY
Qty: 60 TABLET | Refills: 1 | OUTPATIENT
Start: 2024-10-18

## 2024-10-18 RX ORDER — LEVOTHYROXINE SODIUM 75 UG/1
TABLET ORAL
Qty: 258 TABLET | Refills: 5 | OUTPATIENT
Start: 2024-10-18

## 2024-10-18 RX ORDER — ESZOPICLONE 3 MG/1
3 TABLET, FILM COATED ORAL
Qty: 90 TABLET | Refills: 1 | OUTPATIENT
Start: 2024-10-18

## 2024-10-18 RX ORDER — FUROSEMIDE 40 MG
40 TABLET ORAL DAILY
OUTPATIENT
Start: 2024-10-18

## 2024-10-18 RX ORDER — PAROXETINE 10 MG/1
10 TABLET, FILM COATED ORAL EVERY MORNING
Qty: 30 TABLET | Refills: 1 | OUTPATIENT
Start: 2024-10-18

## 2024-10-21 ENCOUNTER — OFFICE VISIT (OUTPATIENT)
Dept: INTERNAL MEDICINE | Facility: CLINIC | Age: 59
End: 2024-10-21
Payer: COMMERCIAL

## 2024-10-21 ENCOUNTER — TELEPHONE (OUTPATIENT)
Dept: CARDIOLOGY | Facility: CLINIC | Age: 59
End: 2024-10-21
Payer: COMMERCIAL

## 2024-10-21 VITALS
HEART RATE: 60 BPM | DIASTOLIC BLOOD PRESSURE: 70 MMHG | WEIGHT: 184 LBS | OXYGEN SATURATION: 100 % | HEIGHT: 66 IN | SYSTOLIC BLOOD PRESSURE: 100 MMHG | TEMPERATURE: 97.3 F | BODY MASS INDEX: 29.57 KG/M2

## 2024-10-21 DIAGNOSIS — E11.9 TYPE 2 DIABETES MELLITUS WITHOUT COMPLICATION, WITHOUT LONG-TERM CURRENT USE OF INSULIN: ICD-10-CM

## 2024-10-21 DIAGNOSIS — R07.89 CHRONIC CHEST WALL PAIN: Primary | ICD-10-CM

## 2024-10-21 DIAGNOSIS — F06.4 ANXIETY DISORDER DUE TO MEDICAL CONDITION: ICD-10-CM

## 2024-10-21 DIAGNOSIS — Z79.899 LONG-TERM USE OF HIGH-RISK MEDICATION: ICD-10-CM

## 2024-10-21 DIAGNOSIS — G89.29 CHRONIC CHEST WALL PAIN: Primary | ICD-10-CM

## 2024-10-21 DIAGNOSIS — I80.01 THROMBOPHLEBITIS OF SUPERFICIAL VEINS OF RIGHT LOWER EXTREMITY: ICD-10-CM

## 2024-10-21 LAB
AMPHET+METHAMPHET UR QL: NEGATIVE
AMPHETAMINE INTERNAL CONTROL: ABNORMAL
AMPHETAMINES UR QL: NEGATIVE
BARBITURATE INTERNAL CONTROL: ABNORMAL
BARBITURATES UR QL SCN: NEGATIVE
BENZODIAZ UR QL SCN: NEGATIVE
BENZODIAZEPINE INTERNAL CONTROL: ABNORMAL
BUPRENORPHINE INTERNAL CONTROL: ABNORMAL
BUPRENORPHINE SERPL-MCNC: NEGATIVE NG/ML
CANNABINOIDS SERPL QL: NEGATIVE
COCAINE INTERNAL CONTROL: ABNORMAL
COCAINE UR QL: NEGATIVE
EXPIRATION DATE: ABNORMAL
Lab: ABNORMAL
MDMA (ECSTASY) INTERNAL CONTROL: ABNORMAL
MDMA UR QL SCN: NEGATIVE
METHADONE INTERNAL CONTROL: ABNORMAL
METHADONE UR QL SCN: NEGATIVE
METHAMPHETAMINE INTERNAL CONTROL: ABNORMAL
MORPHINE INTERNAL CONTROL: ABNORMAL
MORPHINE/OPIATES SCREEN, URINE: NEGATIVE
OXYCODONE INTERNAL CONTROL: ABNORMAL
OXYCODONE UR QL SCN: NEGATIVE
PCP UR QL SCN: NEGATIVE
PHENCYCLIDINE INTERNAL CONTROL: ABNORMAL
PROPOXYPH UR QL SCN: NEGATIVE
PROPOXYPHENE INTERNAL CONTROL: ABNORMAL
THC INTERNAL CONTROL: ABNORMAL
TRICYCLIC ANTIDEPRESSANTS INTERNAL CONTROL: ABNORMAL
TRICYCLICS UR QL SCN: NEGATIVE

## 2024-10-21 PROCEDURE — 99214 OFFICE O/P EST MOD 30 MIN: CPT | Performed by: INTERNAL MEDICINE

## 2024-10-21 RX ORDER — HYDROXYZINE HYDROCHLORIDE 25 MG/1
25 TABLET, FILM COATED ORAL EVERY 6 HOURS PRN
Qty: 120 TABLET | Refills: 1 | Status: SHIPPED | OUTPATIENT
Start: 2024-10-21

## 2024-10-21 RX ORDER — PAROXETINE 20 MG/1
20 TABLET, FILM COATED ORAL EVERY MORNING
Qty: 90 TABLET | Refills: 1 | Status: SHIPPED | OUTPATIENT
Start: 2024-10-21

## 2024-10-21 NOTE — TELEPHONE ENCOUNTER
Patient is requesting a device ID card from [employer] with Dr. Bay's information updated on it.  RN contacted [employer] Patient Registration, updated following cardiologist and ordered new ID card - It will arrive to patient in 7-10 business days.

## 2024-10-21 NOTE — PROGRESS NOTES
"    Chief Complaint  Follow-up (Patient would like to discuss going back on Ozempic.  She is requesting her A1C be checked.  It was 5.9 on 9/20/2024), Chest Pain, Anxiety (Would like to discuss increasing anxiety medication. ), and Leg Pain (Right leg pain x 2 weeks. )    Subjective        Kelli Pedro presents to [facility]  Follow-up  Conditions present:  Anxiety  Associated symptoms include: chest pain and leg pain.   Chest Pain   Associated symptoms include leg pain.   Anxiety   Symptoms include chest pain.   Leg Pain     See below.     Objective   Vital Signs:  /70 (BP Location: Left arm, Patient Position: Sitting, Cuff Size: Adult)   Pulse 60   Temp 97.3 °F (36.3 °C)   Ht 167.6 cm (65.98\")   Wt 83.5 kg (184 lb)   SpO2 100%   BMI 29.72 kg/m²   Estimated body mass index is 29.72 kg/m² as calculated from the following:    Height as of this encounter: 167.6 cm (65.98\").    Weight as of this encounter: 83.5 kg (184 lb).         Physical Exam  HENT:      Head: Normocephalic and atraumatic.   Eyes:      Conjunctiva/sclera: Conjunctivae normal.      Pupils: Pupils are equal, round, and reactive to light.   Cardiovascular:      Rate and Rhythm: Normal rate and regular rhythm.      Heart sounds: Normal heart sounds.   Pulmonary:      Effort: Pulmonary effort is normal. No respiratory distress.      Breath sounds: Normal breath sounds.   Chest:      Chest wall: Tenderness (right of midline from her sternotomy) present.   Musculoskeletal:         General: No swelling.      Cervical back: Neck supple.      Comments: Varicosities of the bilateral lower extremities.  Recently had injections for these on the right lower extremity near the ankle.  Now has a small cluster of vessels on the upper, medial calf that are bruised, slightly firm.  No warmth.   Skin:     General: Skin is warm and dry.      Findings: No rash.      Comments: Sternotomy is well-healed.  No open areas or " sinus tracts.   Neurological:      General: No focal deficit present.      Mental Status: She is alert and oriented to person, place, and time.   Psychiatric:         Mood and Affect: Mood normal.         Behavior: Behavior normal.         Thought Content: Thought content normal.         Judgment: Judgment normal.        Result Review :  Workup from the ER on 10/7:  1.  Coagulation parameters normal.  2.  CBC showed a hemoglobin of 11.7.  3.  CMP showed mildly elevated transaminases.  4.  CTA of the chest showed no acute abnormality of the chest to account for the patient's symptoms.  Atheromatous disease of the thoracic aorta with no evidence of aneurysmal dilatation.  The aortic lumen is normal and the previously seen dilatation of the ascending aorta is not visualized in this study.  Interval placement of an aortic valvular prosthesis.  Lungs are unremarkable.  5.  EKG with paced rhythm.    Hemoglobin A1c was 5.9 on 9/20 after being 5.0 in June.  TSH and free T4 were appropriate on 9/20.         Assessment and Plan   Diagnoses and all orders for this visit:    1. Chronic chest wall pain (Primary)    2. Long-term use of high-risk medication  -     POC Medline 14 Panel Urine Drug Screen    3. Anxiety disorder due to medical condition  -     PARoxetine (Paxil) 20 MG tablet; Take 1 tablet by mouth Every Morning.  Dispense: 90 tablet; Refill: 1  -     hydrOXYzine (ATARAX) 25 MG tablet; Take 1 tablet by mouth Every 6 (Six) Hours As Needed for Anxiety.  Dispense: 120 tablet; Refill: 1    4. Type 2 diabetes mellitus without complication, without long-term current use of insulin  -     Semaglutide,0.25 or 0.5MG/DOS, (OZEMPIC) 2 MG/3ML solution pen-injector; Inject 0.25 mg under the skin into the appropriate area as directed 1 (One) Time Per Week.  Dispense: 3 mL; Refill: 0    5. Possible thrombophlebitis of superficial veins of right lower extremity       Presents today for follow-up.    She has ongoing, chronic chest  wall pain related to her sternotomy done at [facility] in June with Dr. Wolf.  She was recently in the emergency department over it.  She contacted our office seeking further input.  I had asked her to reach out to his office again.  They apparently spoke with her and suggested steroids.  I sent in a Medrol Dosepak last week.  She states that she felt a bit better for 2-3 days, but is now hurting just as bad.  Her workup was reassuring in the ER recently.  Explained to her that I feel like she should go back and see him again.  I do not know how else they would want me to manage this.  She has been on gabapentin, lidocaine patches.  She did not have anything directly apparent on CT imaging.  They recommended pain management.  I doubt that one of the providers here in town would want to start doing injections on a fairly recent sternotomy.  She at 1 point had an appointment to see them in November, but ended up canceling it.  I would recommend that she make that appointment again.    She states that her anxiety is not very well-controlled recently.  Interested in increasing Paxil to 20 mg.  Also sent in hydroxyzine.  She had untoward side effects with BuSpar earlier this year.    She would like for me to reorder semaglutide.  Her hemoglobin A1c went from 5.0 to 5.9 over the summer.  I have sent the prescription in.    She points out a firm, bruised area of the right medial calf.  She recently had injections with Dr. Huynh for spider veins.  This could be an expected finding versus superficial thrombophlebitis.  She is already on aspirin.  Encouraged warm compresses.  She also plans to stop by their office this morning after she leaves here.    She will return for her regular scheduled appointment in December.      Follow Up   Return for Next scheduled follow up.  Patient was given instructions and counseling regarding her condition or for health maintenance advice. Please see specific information  pulled into the AVS if appropriate.      HALLE Tidwell DO       Electronically signed by ELLEN Tidwell DO, 10/21/24, 8:41 AM CDT.

## 2024-10-21 NOTE — TELEPHONE ENCOUNTER
"  Caller: Kelli Pedro \"Carli\"    Relationship: Self    Best call back number: [phone]     What is the best time to reach you: ANYTIME    Who are you requesting to speak with (clinical staff, provider,  specific staff member): DEVICE STAFF    What was the call regarding: PATIENT IS REQUESTING A CALL BACK FROM THE DEVICE STAFF.      "

## 2024-10-27 PROCEDURE — 93294 REM INTERROG EVL PM/LDLS PM: CPT | Performed by: INTERNAL MEDICINE

## 2024-10-27 PROCEDURE — 93296 REM INTERROG EVL PM/IDS: CPT | Performed by: INTERNAL MEDICINE

## 2024-11-01 ENCOUNTER — TRANSCRIBE ORDERS (OUTPATIENT)
Dept: ADMINISTRATIVE | Facility: HOSPITAL | Age: 59
End: 2024-11-01
Payer: COMMERCIAL

## 2024-11-01 ENCOUNTER — LAB (OUTPATIENT)
Dept: LAB | Facility: HOSPITAL | Age: 59
End: 2024-11-01
Payer: COMMERCIAL

## 2024-11-01 ENCOUNTER — OFFICE VISIT (OUTPATIENT)
Age: 59
End: 2024-11-01

## 2024-11-01 VITALS — WEIGHT: 173 LBS | BODY MASS INDEX: 27.8 KG/M2 | HEIGHT: 66 IN

## 2024-11-01 DIAGNOSIS — M32.19 SYSTEMIC LUPUS WITH CENTRAL NERVOUS SYSTEM INVOLVEMENT: ICD-10-CM

## 2024-11-01 DIAGNOSIS — Z79.899 NEED FOR PROPHYLACTIC CHEMOTHERAPY: ICD-10-CM

## 2024-11-01 DIAGNOSIS — M17.12 PRIMARY OSTEOARTHRITIS OF LEFT KNEE: Primary | ICD-10-CM

## 2024-11-01 DIAGNOSIS — Z79.899 NEED FOR PROPHYLACTIC CHEMOTHERAPY: Primary | ICD-10-CM

## 2024-11-01 LAB
ALBUMIN SERPL-MCNC: 3.7 G/DL (ref 3.5–5.2)
ALBUMIN/GLOB SERPL: 1.5 G/DL
ALP SERPL-CCNC: 160 U/L (ref 39–117)
ALT SERPL W P-5'-P-CCNC: 28 U/L (ref 1–33)
ANION GAP SERPL CALCULATED.3IONS-SCNC: 10 MMOL/L (ref 5–15)
AST SERPL-CCNC: 34 U/L (ref 1–32)
BASOPHILS # BLD AUTO: 0.03 10*3/MM3 (ref 0–0.2)
BASOPHILS NFR BLD AUTO: 0.7 % (ref 0–1.5)
BILIRUB SERPL-MCNC: 0.5 MG/DL (ref 0–1.2)
BUN SERPL-MCNC: 13 MG/DL (ref 6–20)
BUN/CREAT SERPL: 20.3 (ref 7–25)
C3 SERPL-MCNC: 173 MG/DL (ref 82–167)
C4 SERPL-MCNC: 32 MG/DL (ref 14–44)
CALCIUM SPEC-SCNC: 8.8 MG/DL (ref 8.6–10.5)
CHLORIDE SERPL-SCNC: 100 MMOL/L (ref 98–107)
CO2 SERPL-SCNC: 28 MMOL/L (ref 22–29)
CREAT SERPL-MCNC: 0.64 MG/DL (ref 0.57–1)
DEPRECATED RDW RBC AUTO: 54.8 FL (ref 37–54)
EGFRCR SERPLBLD CKD-EPI 2021: 101.9 ML/MIN/1.73
EOSINOPHIL # BLD AUTO: 0.17 10*3/MM3 (ref 0–0.4)
EOSINOPHIL NFR BLD AUTO: 4 % (ref 0.3–6.2)
ERYTHROCYTE [DISTWIDTH] IN BLOOD BY AUTOMATED COUNT: 17.2 % (ref 12.3–15.4)
ERYTHROCYTE [SEDIMENTATION RATE] IN BLOOD: 18 MM/HR (ref 0–30)
GLOBULIN UR ELPH-MCNC: 2.5 GM/DL
GLUCOSE SERPL-MCNC: 89 MG/DL (ref 65–99)
HCT VFR BLD AUTO: 37.8 % (ref 34–46.6)
HGB BLD-MCNC: 11.9 G/DL (ref 12–15.9)
IMM GRANULOCYTES # BLD AUTO: 0.01 10*3/MM3 (ref 0–0.05)
IMM GRANULOCYTES NFR BLD AUTO: 0.2 % (ref 0–0.5)
LYMPHOCYTES # BLD AUTO: 0.82 10*3/MM3 (ref 0.7–3.1)
LYMPHOCYTES NFR BLD AUTO: 19.1 % (ref 19.6–45.3)
MCH RBC QN AUTO: 27.2 PG (ref 26.6–33)
MCHC RBC AUTO-ENTMCNC: 31.5 G/DL (ref 31.5–35.7)
MCV RBC AUTO: 86.5 FL (ref 79–97)
MONOCYTES # BLD AUTO: 0.44 10*3/MM3 (ref 0.1–0.9)
MONOCYTES NFR BLD AUTO: 10.2 % (ref 5–12)
NEUTROPHILS NFR BLD AUTO: 2.83 10*3/MM3 (ref 1.7–7)
NEUTROPHILS NFR BLD AUTO: 65.8 % (ref 42.7–76)
NRBC BLD AUTO-RTO: 0 /100 WBC (ref 0–0.2)
PLATELET # BLD AUTO: 145 10*3/MM3 (ref 140–450)
PMV BLD AUTO: 11.4 FL (ref 6–12)
POTASSIUM SERPL-SCNC: 3.7 MMOL/L (ref 3.5–5.2)
PROT SERPL-MCNC: 6.2 G/DL (ref 6–8.5)
RBC # BLD AUTO: 4.37 10*6/MM3 (ref 3.77–5.28)
SODIUM SERPL-SCNC: 138 MMOL/L (ref 136–145)
WBC NRBC COR # BLD AUTO: 4.3 10*3/MM3 (ref 3.4–10.8)

## 2024-11-01 PROCEDURE — 86160 COMPLEMENT ANTIGEN: CPT

## 2024-11-01 PROCEDURE — 80053 COMPREHEN METABOLIC PANEL: CPT

## 2024-11-01 PROCEDURE — 36415 COLL VENOUS BLD VENIPUNCTURE: CPT

## 2024-11-01 PROCEDURE — 85025 COMPLETE CBC W/AUTO DIFF WBC: CPT

## 2024-11-01 PROCEDURE — 85652 RBC SED RATE AUTOMATED: CPT

## 2024-11-01 RX ORDER — BUPIVACAINE HYDROCHLORIDE 5 MG/ML
3 INJECTION, SOLUTION EPIDURAL; INTRACAUDAL ONCE
Status: COMPLETED | OUTPATIENT
Start: 2024-11-01 | End: 2024-11-01

## 2024-11-01 RX ORDER — LIDOCAINE HYDROCHLORIDE 10 MG/ML
1 INJECTION, SOLUTION INFILTRATION; PERINEURAL ONCE
Status: COMPLETED | OUTPATIENT
Start: 2024-11-01 | End: 2024-11-01

## 2024-11-01 RX ORDER — BETAMETHASONE SODIUM PHOSPHATE AND BETAMETHASONE ACETATE 3; 3 MG/ML; MG/ML
6 INJECTION, SUSPENSION INTRA-ARTICULAR; INTRALESIONAL; INTRAMUSCULAR; SOFT TISSUE ONCE
Status: COMPLETED | OUTPATIENT
Start: 2024-11-01 | End: 2024-11-01

## 2024-11-01 RX ORDER — LIDOCAINE HYDROCHLORIDE 10 MG/ML
1 INJECTION, SOLUTION EPIDURAL; INFILTRATION; INTRACAUDAL; PERINEURAL ONCE
Status: DISCONTINUED | OUTPATIENT
Start: 2024-11-01 | End: 2024-11-01

## 2024-11-01 RX ADMIN — BUPIVACAINE HYDROCHLORIDE 15 MG: 5 INJECTION, SOLUTION EPIDURAL; INTRACAUDAL at 08:55

## 2024-11-01 RX ADMIN — BETAMETHASONE SODIUM PHOSPHATE AND BETAMETHASONE ACETATE 6 MG: 3; 3 INJECTION, SUSPENSION INTRA-ARTICULAR; INTRALESIONAL; INTRAMUSCULAR; SOFT TISSUE at 08:56

## 2024-11-01 RX ADMIN — LIDOCAINE HYDROCHLORIDE 1 ML: 10 INJECTION, SOLUTION INFILTRATION; PERINEURAL at 12:45

## 2024-11-01 NOTE — PROGRESS NOTES
Orthopaedic Clinic Note - Established Patient    NAME:  Sherry Fuchs   : 1965  MRN: [id number]      2024      CHIEF COMPLAINT: Left knee pain      HISTORY OF PRESENT ILLNESS:   This is a pleasant 59-year-old white female who comes in with complaints of left knee pain.  Patient has a history of arthritis.  She has had injections in the past.  She is here to discuss further treatment options.    Past Medical History:        Diagnosis Date    Anxiety     Bicuspid aortic valve     H/O Sjogren's disease (HCC)     Hashimoto's thyroiditis     Headache     Hyperthyroidism     Lupus (HCC)     Myasthenia gravis (HCC)        Past Surgical History:        Procedure Laterality Date    BACK SURGERY      cage, jethro and screws     CARPAL TUNNEL RELEASE Bilateral     COLONOSCOPY      In [facility]    ESOPHAGEAL DILATATION      GASTRIC RESTRICTION SURGERY      sleeve    HYSTERECTOMY, VAGINAL      SHOULDER SURGERY      UPPER GASTROINTESTINAL ENDOSCOPY      In [place]       Current Medications:   Prior to Admission medications    Medication Sig Start Date End Date Taking? Authorizing Provider   ranolazine (RANEXA) 500 MG extended release tablet Take 1 tablet by mouth 2 times daily 3/6/24   Snehal Her MD   linaGLIPtin (TRADJENTA PO) Take by mouth    Snehal Her MD   Ubrogepant 100 MG TABS Take 100 mg by mouth as needed (max 2 in 24 hours) 24   Tiburcio Quintero MD   butalbital-acetaminophen-caffeine (FIORICET, ESGIC) -40 MG per tablet Take 1 tablet by mouth every 6 hours as needed for Headaches Max Daily Amount: 4 tablets 23   Tiburcio Quintero MD   dicyclomine (BENTYL) 10 MG capsule Take 1 capsule by mouth 4 times daily (before meals and nightly) 23   Snehal Her MD   eszopiclone (LUNESTA) 3 MG TABS  23   Snehal Her MD   EMGALITY 120 MG/ML SOSY  23   Snehal Her MD   hydrOXYzine HCl (ATARAX) 10 MG tablet TAKE 1 TABLET BY MOUTH THREE TIMES

## 2024-11-02 DIAGNOSIS — E11.9 TYPE 2 DIABETES MELLITUS WITHOUT COMPLICATION, WITHOUT LONG-TERM CURRENT USE OF INSULIN: ICD-10-CM

## 2024-11-05 RX ORDER — HYDROCHLOROTHIAZIDE 25 MG/1
25 TABLET ORAL DAILY
Qty: 30 TABLET | Refills: 1 | OUTPATIENT
Start: 2024-11-05

## 2024-11-06 ENCOUNTER — LAB (OUTPATIENT)
Dept: LAB | Facility: HOSPITAL | Age: 59
End: 2024-11-06
Payer: COMMERCIAL

## 2024-11-06 DIAGNOSIS — D50.9 IRON DEFICIENCY ANEMIA, UNSPECIFIED IRON DEFICIENCY ANEMIA TYPE: ICD-10-CM

## 2024-11-06 LAB
ALBUMIN SERPL-MCNC: 4.4 G/DL (ref 3.5–5.2)
ALBUMIN/GLOB SERPL: 1.6 G/DL
ALP SERPL-CCNC: 160 U/L (ref 39–117)
ALT SERPL W P-5'-P-CCNC: 27 U/L (ref 1–33)
ANION GAP SERPL CALCULATED.3IONS-SCNC: 8 MMOL/L (ref 5–15)
AST SERPL-CCNC: 28 U/L (ref 1–32)
BASOPHILS # BLD AUTO: 0.04 10*3/MM3 (ref 0–0.2)
BASOPHILS NFR BLD AUTO: 0.8 % (ref 0–1.5)
BILIRUB SERPL-MCNC: 0.6 MG/DL (ref 0–1.2)
BUN SERPL-MCNC: 17 MG/DL (ref 6–20)
BUN/CREAT SERPL: 24.6 (ref 7–25)
CALCIUM SPEC-SCNC: 9.4 MG/DL (ref 8.6–10.5)
CHLORIDE SERPL-SCNC: 100 MMOL/L (ref 98–107)
CO2 SERPL-SCNC: 29 MMOL/L (ref 22–29)
CREAT SERPL-MCNC: 0.69 MG/DL (ref 0.57–1)
DEPRECATED RDW RBC AUTO: 55.7 FL (ref 37–54)
EGFRCR SERPLBLD CKD-EPI 2021: 100.1 ML/MIN/1.73
EOSINOPHIL # BLD AUTO: 0.13 10*3/MM3 (ref 0–0.4)
EOSINOPHIL NFR BLD AUTO: 2.7 % (ref 0.3–6.2)
ERYTHROCYTE [DISTWIDTH] IN BLOOD BY AUTOMATED COUNT: 17.3 % (ref 12.3–15.4)
FERRITIN SERPL-MCNC: 132.4 NG/ML (ref 13–150)
GLOBULIN UR ELPH-MCNC: 2.8 GM/DL
GLUCOSE SERPL-MCNC: 84 MG/DL (ref 65–99)
HCT VFR BLD AUTO: 41 % (ref 34–46.6)
HGB BLD-MCNC: 12.6 G/DL (ref 12–15.9)
IMM GRANULOCYTES # BLD AUTO: 0.01 10*3/MM3 (ref 0–0.05)
IMM GRANULOCYTES NFR BLD AUTO: 0.2 % (ref 0–0.5)
IRON 24H UR-MRATE: 88 MCG/DL (ref 37–145)
IRON SATN MFR SERPL: 21 % (ref 20–50)
LYMPHOCYTES # BLD AUTO: 1.21 10*3/MM3 (ref 0.7–3.1)
LYMPHOCYTES NFR BLD AUTO: 25.1 % (ref 19.6–45.3)
MCH RBC QN AUTO: 26.7 PG (ref 26.6–33)
MCHC RBC AUTO-ENTMCNC: 30.7 G/DL (ref 31.5–35.7)
MCV RBC AUTO: 86.9 FL (ref 79–97)
MONOCYTES # BLD AUTO: 0.47 10*3/MM3 (ref 0.1–0.9)
MONOCYTES NFR BLD AUTO: 9.7 % (ref 5–12)
NEUTROPHILS NFR BLD AUTO: 2.97 10*3/MM3 (ref 1.7–7)
NEUTROPHILS NFR BLD AUTO: 61.5 % (ref 42.7–76)
NRBC BLD AUTO-RTO: 0 /100 WBC (ref 0–0.2)
PLATELET # BLD AUTO: 187 10*3/MM3 (ref 140–450)
PMV BLD AUTO: 11.1 FL (ref 6–12)
POTASSIUM SERPL-SCNC: 4.3 MMOL/L (ref 3.5–5.2)
PROT SERPL-MCNC: 7.2 G/DL (ref 6–8.5)
RBC # BLD AUTO: 4.72 10*6/MM3 (ref 3.77–5.28)
SODIUM SERPL-SCNC: 137 MMOL/L (ref 136–145)
TIBC SERPL-MCNC: 425 MCG/DL (ref 298–536)
TRANSFERRIN SERPL-MCNC: 285 MG/DL (ref 200–360)
WBC NRBC COR # BLD AUTO: 4.83 10*3/MM3 (ref 3.4–10.8)

## 2024-11-06 PROCEDURE — 82728 ASSAY OF FERRITIN: CPT

## 2024-11-06 PROCEDURE — 36415 COLL VENOUS BLD VENIPUNCTURE: CPT

## 2024-11-06 PROCEDURE — 80053 COMPREHEN METABOLIC PANEL: CPT

## 2024-11-06 PROCEDURE — 83540 ASSAY OF IRON: CPT

## 2024-11-06 PROCEDURE — 85025 COMPLETE CBC W/AUTO DIFF WBC: CPT

## 2024-11-06 PROCEDURE — 84466 ASSAY OF TRANSFERRIN: CPT

## 2024-11-07 ENCOUNTER — PATIENT MESSAGE (OUTPATIENT)
Dept: INTERNAL MEDICINE | Facility: CLINIC | Age: 59
End: 2024-11-07
Payer: COMMERCIAL

## 2024-11-07 ENCOUNTER — TELEPHONE (OUTPATIENT)
Dept: ONCOLOGY | Facility: CLINIC | Age: 59
End: 2024-11-07

## 2024-11-07 ENCOUNTER — TELEPHONE (OUTPATIENT)
Dept: INTERNAL MEDICINE | Facility: CLINIC | Age: 59
End: 2024-11-07

## 2024-11-07 DIAGNOSIS — R74.8 ELEVATED ALKALINE PHOSPHATASE LEVEL: Primary | ICD-10-CM

## 2024-11-07 RX ORDER — FERROUS SULFATE 325(65) MG
325 TABLET ORAL 3 TIMES WEEKLY
Qty: 36 TABLET | Refills: 1 | Status: SHIPPED | OUTPATIENT
Start: 2024-11-08

## 2024-11-07 NOTE — TELEPHONE ENCOUNTER
"  Caller: Kelli Pedro \"Carli\"    Relationship: Self    Best call back number: [phone]     What is the best time to reach you: ANY    Who are you requesting to speak with (clinical staff, provider,  specific staff member): NONE SPECIFIED     What was the call regarding:     PATIENT WOULD LIKE TO CHECK ON THE STATUS OF 11.02.24 REFILL REQUEST.     Is it okay if the provider responds through [employer]: PLEASE CALL     "

## 2024-11-07 NOTE — TELEPHONE ENCOUNTER
"  Caller: Kelli Pedro \"Carli\"    Relationship: Self    Best call back number: [phone]    What is the best time to reach you: ANYTIME    Who are you requesting to speak with (clinical staff, provider,  specific staff member): CLINICAL    What was the call regarding: PT IS CALLING TOP CHECK THE STATUS OF IRON MEDICATION BEING CALLED INTO THE PHARMACY    [facility] - [address] [phone] [facility] [phone] FX [[id number]]           "

## 2024-11-08 RX ORDER — HYDROCHLOROTHIAZIDE 25 MG/1
25 TABLET ORAL DAILY
Qty: 30 TABLET | Refills: 1 | Status: SHIPPED | OUTPATIENT
Start: 2024-11-08

## 2024-11-12 RX ORDER — METAXALONE 800 MG/1
800 TABLET ORAL 3 TIMES DAILY
Qty: 90 TABLET | Refills: 0 | Status: SHIPPED | OUTPATIENT
Start: 2024-11-12

## 2024-11-12 NOTE — TELEPHONE ENCOUNTER
Requested Prescriptions     Pending Prescriptions Disp Refills    metaxalone (SKELAXIN) 800 MG tablet [Pharmacy Med Name: metaxalone 800 mg tablet] 90 tablet 3     Sig: TAKE ONE TABLET BY MOUTH THREE TIMES DAILY       Last Office Visit: 3/6/2024  Next Office Visit: Visit date not found  Last Medication Refill: 12/1/2023 with 5 RF

## 2024-11-13 ENCOUNTER — OFFICE VISIT (OUTPATIENT)
Dept: INTERNAL MEDICINE | Facility: CLINIC | Age: 59
End: 2024-11-13
Payer: COMMERCIAL

## 2024-11-13 VITALS
HEIGHT: 66 IN | DIASTOLIC BLOOD PRESSURE: 60 MMHG | OXYGEN SATURATION: 98 % | SYSTOLIC BLOOD PRESSURE: 90 MMHG | WEIGHT: 178 LBS | BODY MASS INDEX: 28.61 KG/M2 | TEMPERATURE: 96.9 F | HEART RATE: 82 BPM

## 2024-11-13 DIAGNOSIS — R74.8 ELEVATED ALKALINE PHOSPHATASE LEVEL: ICD-10-CM

## 2024-11-13 DIAGNOSIS — L29.89 PRURITIC ERYTHEMATOUS RASH: Primary | ICD-10-CM

## 2024-11-13 PROCEDURE — 99213 OFFICE O/P EST LOW 20 MIN: CPT | Performed by: INTERNAL MEDICINE

## 2024-11-13 PROCEDURE — 96372 THER/PROPH/DIAG INJ SC/IM: CPT | Performed by: INTERNAL MEDICINE

## 2024-11-13 RX ORDER — METHYLPREDNISOLONE SODIUM SUCCINATE 40 MG/ML
40 INJECTION, POWDER, LYOPHILIZED, FOR SOLUTION INTRAMUSCULAR; INTRAVENOUS ONCE
Status: COMPLETED | OUTPATIENT
Start: 2024-11-13 | End: 2024-11-13

## 2024-11-13 RX ORDER — MYCOPHENOLATE MOFETIL 500 MG/1
1 TABLET ORAL DAILY
COMMUNITY
Start: 2024-11-07

## 2024-11-13 RX ORDER — METHYLPREDNISOLONE 4 MG/1
TABLET ORAL
Qty: 21 TABLET | Refills: 0 | Status: SHIPPED | OUTPATIENT
Start: 2024-11-14

## 2024-11-13 RX ADMIN — METHYLPREDNISOLONE SODIUM SUCCINATE 40 MG: 40 INJECTION, POWDER, LYOPHILIZED, FOR SOLUTION INTRAMUSCULAR; INTRAVENOUS at 15:10

## 2024-11-13 NOTE — PROGRESS NOTES
"    Chief Complaint  Rash (Rash on arms and legs x 1 week. Red and itchy. Taking Benadryl.  Denies new lotion, new laundry detergent or medication. )    Subjective        Kelli Pedro presents to [facility]IMARY [facility]  Rash      See below.     Objective   Vital Signs:  BP 90/60 (BP Location: Left arm, Patient Position: Sitting, Cuff Size: Adult)   Pulse 82   Temp 96.9 °F (36.1 °C) (Temporal)   Ht 167.6 cm (65.98\")   Wt 80.7 kg (178 lb)   SpO2 98%   BMI 28.75 kg/m²   Estimated body mass index is 28.75 kg/m² as calculated from the following:    Height as of this encounter: 167.6 cm (65.98\").    Weight as of this encounter: 80.7 kg (178 lb).         Physical Exam  HENT:      Head: Normocephalic and atraumatic.   Eyes:      Conjunctiva/sclera: Conjunctivae normal.      Pupils: Pupils are equal, round, and reactive to light.   Pulmonary:      Effort: Pulmonary effort is normal. No respiratory distress.   Musculoskeletal:         General: No swelling.   Skin:     General: Skin is warm and dry.      Findings: Rash present.   Neurological:      General: No focal deficit present.      Mental Status: She is alert and oriented to person, place, and time.   Psychiatric:         Mood and Affect: Mood normal.         Behavior: Behavior normal.         Thought Content: Thought content normal.         Judgment: Judgment normal.              Result Review :           Assessment and Plan   Diagnoses and all orders for this visit:    1. Pruritic erythematous rash (Primary)  -     methylPREDNISolone sodium succinate (SOLU-Medrol) injection 40 mg  -     methylPREDNISolone (MEDROL) 4 MG dose pack; Take as directed on package instructions.  Dispense: 21 tablet; Refill: 0    2. Elevated alkaline phosphatase level  -     Gamma GT       Presents today for problem visit.      She has had a pruritic rash over the last several days.  This borders ongoing on a week.  She has not changed detergents.  No new soaps or " lotions.  She has not been on any new medications.  No new foods.  She has not had anything like this before.  She has used Benadryl cream and hydrocortisone cream that have not improved the rash or the itching.    Photos are provided above.  This is mostly on flexural surfaces.    Plan to give her a dose of IM Solu-Medrol today followed by Medrol Dosepak.  She has no other systemic symptoms with this.  No fevers.     Checking a GGT.  I tried to add this to her labs for 11/6, but it was never processed.    She has an appointment to see me on 12/19 and she will keep that.      Follow Up   Return for Next scheduled follow up.  Patient was given instructions and counseling regarding her condition or for health maintenance advice. Please see specific information pulled into the AVS if appropriate.      HALLE Tidwell DO       Electronically signed by ELLEN Tidwell DO, 11/13/24, 3:02 PM CST.

## 2024-11-14 LAB — GGT SERPL-CCNC: 10 U/L (ref 5–36)

## 2024-11-18 PROBLEM — Z95.2 S/P AVR (AORTIC VALVE REPLACEMENT): Status: ACTIVE | Noted: 2024-11-18

## 2024-11-18 PROBLEM — E78.2 MIXED HYPERLIPIDEMIA: Status: ACTIVE | Noted: 2024-11-18

## 2024-11-18 NOTE — PROGRESS NOTES
Chief Complaint  S/P AVR (4mo F/U), Myocardial Bridge, and Hypertension    Subjective          Kelli Pedro presents to [facility] CARDIOLOGY for routine follow-up.  She has ascending aortic aneurysm status post ascending hemiarch replacement with aortic valve replacement (Inspiris 25 mm) and myocardial bridge resection 6/18/2024 at [facility], post procedure complete heart block status post pacemaker, hypertension, hyperlipidemia and prediabetes.  She reports ongoing musculoskeletal pain since her surgery. She states she is having injections in the sternal joints tomorrow per pain management. Patient denies chest pain, shortness of breath, palpitations, dizziness, syncope, orthopnea, PND, edema or decreased stamina.  Patient denies any signs of bleeding.    Hypertension  This is a chronic problem. The current episode started more than 1 year ago. The problem is controlled. Pertinent negatives include no anxiety, blurred vision, chest pain, headaches, malaise/fatigue, neck pain, orthopnea, palpitations, peripheral edema, PND, shortness of breath or sweats. Risk factors for coronary artery disease include post-menopausal state and dyslipidemia. Current antihypertension treatment includes angiotensin blockers, beta blockers and diuretics. The current treatment provides significant improvement.   Hyperlipidemia  This is a chronic problem. The current episode started more than 1 year ago. Pertinent negatives include no chest pain or shortness of breath. Current antihyperlipidemic treatment includes statins. Risk factors for coronary artery disease include hypertension, post-menopausal and dyslipidemia.       Objective     Current Outpatient Medications:     aspirin 81 MG chewable tablet, Chew 1 tablet Daily., Disp: , Rfl:     atorvastatin (LIPITOR) 40 MG tablet, Take 1 tablet by mouth Daily., Disp: , Rfl:     Continuous Glucose  (FreeStyle Aydin 2 Aguila) device, Use 1 each  Continuous., Disp: , Rfl:     Continuous Glucose Sensor (FreeStyle Aydin 2 Sensor) misc, Use 1 each Every 14 (Fourteen) Days., Disp: , Rfl:     eszopiclone (LUNESTA) 3 MG tablet, Take 1 tablet by mouth Every Night. Take immediately before bedtime, Disp: 90 tablet, Rfl: 1    ferrous sulfate 325 (65 FE) MG tablet, Take 1 tablet by mouth 3 (Three) Times a Week., Disp: 36 tablet, Rfl: 1    fluticasone (FLONASE) 50 MCG/ACT nasal spray, 2 sprays into the nostril(s) as directed by provider Daily., Disp: 16 g, Rfl: 1    gabapentin (NEURONTIN) 300 MG capsule, Take 1 capsule by mouth 3 (Three) Times a Day., Disp: 90 capsule, Rfl: 1    Galcanezumab-gnlm (Emgality) 120 MG/ML solution prefilled syringe, Inject 1 mL under the skin into the appropriate area as directed Every 30 (Thirty) Days., Disp: 1 mL, Rfl: 5    hydroCHLOROthiazide 25 MG tablet, Take 1 tablet by mouth Daily., Disp: 30 tablet, Rfl: 1    hydrOXYzine (ATARAX) 25 MG tablet, Take 1 tablet by mouth Every 6 (Six) Hours As Needed for Anxiety., Disp: 120 tablet, Rfl: 1    lansoprazole (PREVACID) 30 MG capsule, Take 1 capsule by mouth Daily., Disp: 90 capsule, Rfl: 3    levothyroxine (Synthroid) 75 MCG tablet, Take 1/2 tablet on Wednesdays. Skip Sunday dose. Take one tablet daily Okvngf-Xnyzadi-Uuetifdj-Friday-Saturday., Disp: 90 tablet, Rfl: 3    lidocaine (LIDODERM) 5 %, Place 1 patch on the skin as directed by provider Daily. Remove & Discard patch within 12 hours or as directed by MD, Disp: 14 each, Rfl: 1    linaclotide (Linzess) 145 MCG capsule capsule, Take 1 capsule by mouth Every Morning Before Breakfast., Disp: 90 capsule, Rfl: 1    losartan (Cozaar) 25 MG tablet, Take 0.5 tablets by mouth Daily., Disp: 45 tablet, Rfl: 1    magnesium oxide (MAG-OX) 400 MG tablet, Take 1 tablet by mouth Daily., Disp: 90 tablet, Rfl: 1    metaxalone (SKELAXIN) 800 MG tablet, Take 1 tablet by mouth 3 (Three) Times a Day As Needed for Muscle Spasms., Disp: 90 tablet, Rfl: 5     "mycophenolate (CELLCEPT) 500 MG tablet, Take 1 tablet by mouth Daily., Disp: , Rfl:     Narcan 4 MG/0.1ML nasal spray, Administer 1 spray into the nostril(s) as directed by provider As Needed., Disp: , Rfl:     nitroglycerin (NITROSTAT) 0.4 MG SL tablet, Place 1 tablet under the tongue Every 5 (Five) Minutes As Needed for Chest Pain. Take no more than 3 doses in 15 minutes., Disp: 100 tablet, Rfl: 2    ondansetron ODT (ZOFRAN-ODT) 4 MG disintegrating tablet, PLACE ONE TABLET ON THE TOP OF TONGUE EVERY 8 HOURS AS NEEDED FOR NAUSEA OR FOR VOMITING, Disp: 30 tablet, Rfl: 5    PARoxetine (Paxil) 20 MG tablet, Take 1 tablet by mouth Every Morning., Disp: 90 tablet, Rfl: 1    pilocarpine (SALAGEN) 5 MG tablet, Take 1 tablet by mouth 3 (Three) Times a Day., Disp: , Rfl:     potassium chloride (K-DUR,KLOR-CON) 10 MEQ CR tablet, Take 1 tablet by mouth 2 (Two) Times a Day., Disp: , Rfl:     propranolol (INDERAL) 10 MG tablet, Take 1 tablet by mouth 2 (Two) Times a Day., Disp: 180 tablet, Rfl: 2    Semaglutide,0.25 or 0.5MG/DOS, (OZEMPIC) 2 MG/3ML solution pen-injector, Inject 0.5 mg under the skin into the appropriate area as directed 1 (One) Time Per Week., Disp: 3 mL, Rfl: 0    ubrogepant (UBRELVY) 100 MG tablet, Take 1 tablet at the onset of headache. May repeat dose x1 in 2 hours if headache is not resolved. Max dose 200 mg/24 hours., Disp: 30 tablet, Rfl: 2  Vital Signs:   /73   Pulse 63   Ht 167.6 cm (66\")   Wt 78.9 kg (174 lb)   SpO2 98%   BMI 28.08 kg/m²     Vitals and nursing note reviewed.   Constitutional:       General: Not in acute distress.     Appearance: Normal and healthy appearance. Well-developed, normal weight, overweight and not in distress. Not diaphoretic.   Eyes:      General: Lids are normal.         Right eye: No discharge.         Left eye: No discharge.      Conjunctiva/sclera: Conjunctivae normal.      Pupils: Pupils are equal, round, and reactive to light.   HENT:      Head: " Normocephalic and atraumatic.      Jaw: There is normal jaw occlusion.      Right Ear: External ear normal.      Left Ear: External ear normal.      Nose: Nose normal.   Neck:      Thyroid: No thyromegaly.      Vascular: No carotid bruit, JVD or JVR. JVD normal.      Trachea: Trachea normal. No tracheal deviation.   Pulmonary:      Effort: Pulmonary effort is normal. No respiratory distress.      Breath sounds: Normal breath sounds. No decreased breath sounds. No wheezing. No rhonchi. No rales.   Chest:      Chest wall: Not tender to palpatation.   Cardiovascular:      PMI at left midclavicular line. Normal rate. Regular rhythm. Normal S1. Normal S2.       Murmurs: There is a grade 2/6 harsh midsystolic murmur at the URSB, radiating to the neck. There is a grade 2/4 high frequency blowing decrescendo, early diastolic murmur at the URSB, radiating to the apex.      No gallop.  No click. No rub.   Pulses:     Intact distal pulses. No decreased pulses.   Edema:     Peripheral edema absent.   Abdominal:      General: Bowel sounds are normal. There is no distension.      Palpations: Abdomen is soft.      Tenderness: There is no abdominal tenderness.   Musculoskeletal: Normal range of motion.         General: No tenderness or deformity.      Cervical back: Normal range of motion and neck supple. Skin:     General: Skin is warm and dry.      Coloration: Skin is not pale.      Findings: No erythema or rash.   Neurological:      General: No focal deficit present.      Mental Status: Alert, oriented to person, place, and time and oriented to person, place and time.   Psychiatric:         Attention and Perception: Attention and perception normal.         Mood and Affect: Mood and affect normal.         Speech: Speech normal.         Behavior: Behavior normal.         Thought Content: Thought content normal.         Cognition and Memory: Cognition and memory normal.         Judgment: Judgment normal.        Result Review :    The following data was reviewed by: LAQUITA Gudino on 11/19/2024:  Common labs          10/7/2024    10:46 11/1/2024    07:15 11/6/2024    06:14   Common Labs   Glucose 92  89  84    BUN 18  13  17    Creatinine 0.60  0.64  0.69    Sodium 137  138  137    Potassium 3.9  3.7  4.3    Chloride 103  100  100    Calcium 9.1  8.8  9.4    Albumin 3.9  3.7  4.4    Total Bilirubin 0.3  0.5  0.6    Alkaline Phosphatase 185  160  160    AST (SGOT) 70  34  28    ALT (SGPT) 51  28  27    WBC 4.50  4.30  4.83    Hemoglobin 11.7  11.9  12.6    Hematocrit 38.8  37.8  41.0    Platelets 241  145  187      Data reviewed : Cardiology studies 2d echo 6/24/24 at [place],            Assessment and Plan    Diagnoses and all orders for this visit:    1. Aneurysm of ascending aorta without rupture (Primary)- s/p ascending hemiarch replacement at [facility] 6/18/24.     2. Bicuspid aortic valve- s/p AVR.     3. S/P AVR (aortic valve replacement)- 25 mm Inspiris valve at [facility] 6/18/24. Normal function on 2d echo 6/24/24.     4. Myocardial bridge- LAD s/p resection at [facility] 6/18/24.     5. Presence of cardiac pacemaker- dual chamber device implanted at [facility] 6/26/24. Interrogated today in our office. 5% a-paced, 99.9% v-paced. Full report pending.     6. Primary hypertension- blood pressures are well controlled. Continue losartan, HCTZ and propranolol.  Monitor and record daily blood pressure. Report readings consistently higher than 130/80 or consistently lower than 100/60.     7. Mixed hyperlipidemia- management per PCP. Continue statin.     8. Complete heart block- post procedure. Pacemaker dependent.         Follow Up   Return in about 6 months (around 5/19/2025) for Next scheduled follow up.  Patient was given instructions and counseling regarding her condition or for health maintenance advice. Please see specific information pulled into the AVS if appropriate.

## 2024-11-19 ENCOUNTER — OFFICE VISIT (OUTPATIENT)
Dept: CARDIOLOGY | Facility: CLINIC | Age: 59
End: 2024-11-19
Payer: COMMERCIAL

## 2024-11-19 VITALS
WEIGHT: 174 LBS | OXYGEN SATURATION: 98 % | HEIGHT: 66 IN | DIASTOLIC BLOOD PRESSURE: 73 MMHG | HEART RATE: 63 BPM | BODY MASS INDEX: 27.97 KG/M2 | SYSTOLIC BLOOD PRESSURE: 106 MMHG

## 2024-11-19 DIAGNOSIS — Z95.2 S/P AVR (AORTIC VALVE REPLACEMENT): ICD-10-CM

## 2024-11-19 DIAGNOSIS — Z95.0 PRESENCE OF CARDIAC PACEMAKER: ICD-10-CM

## 2024-11-19 DIAGNOSIS — I71.21 ANEURYSM OF ASCENDING AORTA WITHOUT RUPTURE: Primary | ICD-10-CM

## 2024-11-19 DIAGNOSIS — Q23.81 BICUSPID AORTIC VALVE: ICD-10-CM

## 2024-11-19 DIAGNOSIS — I10 PRIMARY HYPERTENSION: ICD-10-CM

## 2024-11-19 DIAGNOSIS — Q24.5 MYOCARDIAL BRIDGE: ICD-10-CM

## 2024-11-19 DIAGNOSIS — E78.2 MIXED HYPERLIPIDEMIA: ICD-10-CM

## 2024-11-19 PROBLEM — I44.2 CHB (COMPLETE HEART BLOCK): Status: ACTIVE | Noted: 2024-11-19

## 2024-11-19 PROCEDURE — 99214 OFFICE O/P EST MOD 30 MIN: CPT | Performed by: NURSE PRACTITIONER

## 2024-12-03 DIAGNOSIS — M79.2 NEUROPATHIC PAIN OF CHEST: ICD-10-CM

## 2024-12-03 RX ORDER — GABAPENTIN 300 MG/1
300 CAPSULE ORAL 3 TIMES DAILY
Qty: 90 CAPSULE | Refills: 1 | Status: SHIPPED | OUTPATIENT
Start: 2024-12-06

## 2024-12-09 ENCOUNTER — TRANSCRIBE ORDERS (OUTPATIENT)
Dept: ADMINISTRATIVE | Facility: HOSPITAL | Age: 59
End: 2024-12-09
Payer: COMMERCIAL

## 2024-12-09 DIAGNOSIS — G43.701 CHRONIC MIGRAINE WITHOUT AURA WITH STATUS MIGRAINOSUS, NOT INTRACTABLE: ICD-10-CM

## 2024-12-09 DIAGNOSIS — M94.0 COSTOCHONDRITIS: ICD-10-CM

## 2024-12-09 DIAGNOSIS — F06.4 ANXIETY DISORDER DUE TO MEDICAL CONDITION: ICD-10-CM

## 2024-12-09 DIAGNOSIS — I10 ESSENTIAL HYPERTENSION: ICD-10-CM

## 2024-12-09 DIAGNOSIS — Z12.31 ENCOUNTER FOR SCREENING MAMMOGRAM FOR MALIGNANT NEOPLASM OF BREAST: Primary | ICD-10-CM

## 2024-12-09 RX ORDER — LIDOCAINE 50 MG/G
1 PATCH TOPICAL EVERY 24 HOURS
Qty: 14 EACH | Refills: 1 | Status: SHIPPED | OUTPATIENT
Start: 2024-12-18

## 2024-12-09 RX ORDER — GALCANEZUMAB 120 MG/ML
1 INJECTION, SOLUTION SUBCUTANEOUS
Qty: 1 ML | Refills: 5 | Status: SHIPPED | OUTPATIENT
Start: 2025-01-06

## 2024-12-09 RX ORDER — HYDROCHLOROTHIAZIDE 25 MG/1
25 TABLET ORAL DAILY
Qty: 30 TABLET | Refills: 1 | Status: SHIPPED | OUTPATIENT
Start: 2024-12-09

## 2024-12-09 RX ORDER — HYDROXYZINE HYDROCHLORIDE 25 MG/1
25 TABLET, FILM COATED ORAL EVERY 6 HOURS PRN
Qty: 120 TABLET | Refills: 1 | Status: SHIPPED | OUTPATIENT
Start: 2024-12-09

## 2024-12-09 RX ORDER — LOSARTAN POTASSIUM 25 MG/1
12.5 TABLET ORAL DAILY
Qty: 15 TABLET | Refills: 1 | Status: SHIPPED | OUTPATIENT
Start: 2024-12-09

## 2024-12-10 DIAGNOSIS — Z13.79 GENETIC TESTING: Primary | ICD-10-CM

## 2024-12-10 LAB
NCCN CRITERIA FLAG: ABNORMAL
TYRER CUZICK SCORE: 12.1

## 2024-12-13 ENCOUNTER — OFFICE VISIT (OUTPATIENT)
Age: 59
End: 2024-12-13

## 2024-12-13 VITALS — WEIGHT: 168 LBS | BODY MASS INDEX: 27 KG/M2 | HEIGHT: 66 IN

## 2024-12-13 DIAGNOSIS — M17.12 PRIMARY OSTEOARTHRITIS OF LEFT KNEE: Primary | ICD-10-CM

## 2024-12-13 DIAGNOSIS — F06.4 ANXIETY DISORDER DUE TO MEDICAL CONDITION: ICD-10-CM

## 2024-12-13 RX ORDER — HYDROXYZINE HYDROCHLORIDE 25 MG/1
25 TABLET, FILM COATED ORAL EVERY 6 HOURS PRN
Qty: 120 TABLET | Refills: 1 | OUTPATIENT
Start: 2024-12-13

## 2024-12-13 NOTE — PROGRESS NOTES
Orthopaedic Clinic Note - Established Patient    NAME:  Sherry Fuchs   : 1965  MRN: [id number]      2024      CHIEF COMPLAINT: Increasing left knee pain      HISTORY OF PRESENT ILLNESS:   Pleasant 59-year-old comes in for increasing  left knee pain.  No recent injury or trauma.  Patient has a history of osteoarthritis.  She has had previous injections in the past.  She is here discuss further treatment options.    Past Medical History:        Diagnosis Date    Anxiety     Bicuspid aortic valve     Deep vein thrombosis (HCC)     Diabetes mellitus (HCC)     GERD (gastroesophageal reflux disease)     H/O Sjogren's disease (HCC)     Hashimoto's thyroiditis     Headache     Heart disease     Hyperthyroidism     Lupus     Myasthenia gravis (HCC)        Past Surgical History:        Procedure Laterality Date    BACK SURGERY      cage, jethro and screws     CARDIAC SURGERY      CARPAL TUNNEL RELEASE Bilateral     COLONOSCOPY      In [facility]    ESOPHAGEAL DILATATION      GASTRIC RESTRICTION SURGERY      sleeve    HYSTERECTOMY, VAGINAL      SHOULDER SURGERY      SPINAL FUSION      UPPER GASTROINTESTINAL ENDOSCOPY  2019    In [place]       Current Medications:   Prior to Admission medications    Medication Sig Start Date End Date Taking? Authorizing Provider   metaxalone (SKELAXIN) 800 MG tablet TAKE ONE TABLET BY MOUTH THREE TIMES DAILY 24   Tiburcio Quintero MD   ranolazine (RANEXA) 500 MG extended release tablet Take 1 tablet by mouth 2 times daily 3/6/24   ProviderSnehal MD   linaGLIPtin (TRADJENTA PO) Take by mouth    ProviderSnehal MD   Ubrogepant 100 MG TABS Take 100 mg by mouth as needed (max 2 in 24 hours) 24   Tiburcio Quintero MD   butalbital-acetaminophen-caffeine (FIORICET, ESGIC) -40 MG per tablet Take 1 tablet by mouth every 6 hours as needed for Headaches Max Daily Amount: 4 tablets 23   Tiburcio Quintero MD   dicyclomine (BENTYL) 10 MG capsule Take 1 capsule by

## 2024-12-19 ENCOUNTER — OFFICE VISIT (OUTPATIENT)
Dept: INTERNAL MEDICINE | Facility: CLINIC | Age: 59
End: 2024-12-19
Payer: COMMERCIAL

## 2024-12-19 VITALS
TEMPERATURE: 96 F | SYSTOLIC BLOOD PRESSURE: 106 MMHG | OXYGEN SATURATION: 97 % | BODY MASS INDEX: 28.12 KG/M2 | HEART RATE: 79 BPM | WEIGHT: 175 LBS | DIASTOLIC BLOOD PRESSURE: 68 MMHG | HEIGHT: 66 IN

## 2024-12-19 DIAGNOSIS — J02.9 SORE THROAT: ICD-10-CM

## 2024-12-19 DIAGNOSIS — I95.1 ORTHOSTASIS: Primary | ICD-10-CM

## 2024-12-19 DIAGNOSIS — R53.83 OTHER FATIGUE: ICD-10-CM

## 2024-12-19 DIAGNOSIS — Z95.2 S/P AVR (AORTIC VALVE REPLACEMENT): ICD-10-CM

## 2024-12-19 DIAGNOSIS — E11.9 TYPE 2 DIABETES MELLITUS WITHOUT COMPLICATION, WITHOUT LONG-TERM CURRENT USE OF INSULIN: ICD-10-CM

## 2024-12-19 LAB
EXPIRATION DATE: NORMAL
EXPIRATION DATE: NORMAL
HBA1C MFR BLD: 5.5 % (ref 4.5–5.7)
INTERNAL CONTROL: NORMAL
Lab: NORMAL
Lab: NORMAL
S PYO AG THROAT QL: NEGATIVE

## 2024-12-19 PROCEDURE — 99214 OFFICE O/P EST MOD 30 MIN: CPT | Performed by: INTERNAL MEDICINE

## 2024-12-19 PROCEDURE — 87880 STREP A ASSAY W/OPTIC: CPT | Performed by: INTERNAL MEDICINE

## 2024-12-19 PROCEDURE — 83036 HEMOGLOBIN GLYCOSYLATED A1C: CPT | Performed by: INTERNAL MEDICINE

## 2024-12-19 NOTE — PROGRESS NOTES
"    Chief Complaint  Follow-up (3 month follow up.  Annual due 2/12/2025 Would like to discuss medications.  She states she will not be able to afford her medication after the first of the year. ), Dizziness, Neck Pain (Left sided neck pain x 2 weeks.  ), Cough (Productive cough x 2 days.  ), Fatigue (Patient states she is ready to go to bed by 5:30 every night due to being so tired. ), Nausea (X 2-3 days. ), and Diabetes (A1C 5.5)    Sarika Pedro presents to [facility]  History of Present Illness  See below.     Objective   Vital Signs:  /68 (BP Location: Right arm, Patient Position: Sitting, Cuff Size: Adult)   Pulse 79   Temp 96 °F (35.6 °C) (Temporal)   Ht 167.6 cm (66\")   Wt 79.4 kg (175 lb)   SpO2 97%   BMI 28.25 kg/m²   Estimated body mass index is 28.25 kg/m² as calculated from the following:    Height as of this encounter: 167.6 cm (66\").    Weight as of this encounter: 79.4 kg (175 lb).         Physical Exam  HENT:      Head: Normocephalic and atraumatic.      Right Ear: Tympanic membrane and ear canal normal.      Left Ear: Tympanic membrane and ear canal normal.      Nose: No congestion or rhinorrhea.      Mouth/Throat:      Mouth: Mucous membranes are moist.      Pharynx: Posterior oropharyngeal erythema present. No oropharyngeal exudate.   Eyes:      Conjunctiva/sclera: Conjunctivae normal.      Pupils: Pupils are equal, round, and reactive to light.   Cardiovascular:      Rate and Rhythm: Normal rate and regular rhythm.      Heart sounds: Normal heart sounds.   Pulmonary:      Effort: Pulmonary effort is normal. No respiratory distress.      Breath sounds: Normal breath sounds.   Musculoskeletal:         General: Swelling (trace) present.      Cervical back: Neck supple. Tenderness (left neck under jawline without swelling, adenopathy) present. No rigidity.   Skin:     General: Skin is warm and dry.      Findings: No rash. " "  Neurological:      General: No focal deficit present.      Mental Status: She is alert and oriented to person, place, and time.   Psychiatric:      Comments: Flat affect, but conversational.  She was frustrated at times.        Result Review :  Strep testing negative.    Hemoglobin A1c 5.5 after being 5.9 in September.         Assessment and Plan   Diagnoses and all orders for this visit:    1. Orthostasis (Primary)    2. S/P AVR (aortic valve replacement)  Overview:  25 mm Inspiris at [facility] 6/18/24      3. Other fatigue    4. Type 2 diabetes mellitus without complication, without long-term current use of insulin  -     POC Glycated Hemoglobin, Total    5. Sore throat  -     POC Rapid Strep A       Presents today for follow-up.    She sent a message recently that she has been extremely fatigued.  I suggested that this could be related to the fact that she underwent a major cardiac surgery a few months ago, returned to work, and is just having issues with stamina.  We did talk more today and I think that some of this could be related to orthostasis.  She gets dizzy upon standing.  Her blood pressure is 106/68 today.  She states that it has been very tightly controlled.  I would like for her to go off of her losartan for at least the next week to see if this moves the needle at all on her symptoms and she will also continue to monitor her blood pressure in the time that she is off of it.  She will continue on HCTZ.    Hemoglobin A1c is 5.5 (5.9).  She was seeing endocrinology in [place] at 1 point.  She is on semaglutide 1 mg weekly.  She tolerates this well in her opinion.  She has some chronic nausea.  I asked her if this might be related to that medication and she does not feel that to be true.    She brought up some difficulty with sore throat and dry cough for the last few days.  She states that she has pain in her left tonsil and left neck.  This is how she \"gets when she has strep.\"  Swab was " negative.  Conservative management and monitoring of symptoms for now.    She states that her insurance is going to scar causing more problems with her medication affordability after the first of the year.  She states that lots of different medications were going to start costing $75.  We talked about her reviewing [employer] to see which medications might be more affordable if not run on insurance.  We can try and sort through this.    She has had some improvement in some of her chronic pain including the pain of her chest.  [facility] [place] and states that they have been doing dry needling.    Her next appointment will be in March.  She knows that she can reach out sooner if problems.      Follow Up   Return for Next scheduled follow up.  Patient was given instructions and counseling regarding her condition or for health maintenance advice. Please see specific information pulled into the AVS if appropriate.      HALLE Tidwell DO       Electronically signed by ELLEN Tidwell DO, 12/19/24, 9:34 AM CST.

## 2024-12-20 ENCOUNTER — LAB (OUTPATIENT)
Dept: LAB | Facility: HOSPITAL | Age: 59
End: 2024-12-20
Payer: COMMERCIAL

## 2024-12-20 ENCOUNTER — HOSPITAL ENCOUNTER (OUTPATIENT)
Dept: MAMMOGRAPHY | Facility: HOSPITAL | Age: 59
Discharge: HOME | End: 2024-12-20
Payer: COMMERCIAL

## 2024-12-20 DIAGNOSIS — Z13.79 GENETIC TESTING: ICD-10-CM

## 2024-12-20 DIAGNOSIS — Z12.31 ENCOUNTER FOR SCREENING MAMMOGRAM FOR MALIGNANT NEOPLASM OF BREAST: ICD-10-CM

## 2024-12-20 LAB
NCCN CRITERIA FLAG: ABNORMAL
TYRER CUZICK SCORE: 12.1

## 2024-12-20 PROCEDURE — 77063 BREAST TOMOSYNTHESIS BI: CPT

## 2024-12-20 PROCEDURE — 36415 COLL VENOUS BLD VENIPUNCTURE: CPT

## 2024-12-20 PROCEDURE — 77067 SCR MAMMO BI INCL CAD: CPT

## 2024-12-26 ENCOUNTER — LAB (OUTPATIENT)
Dept: INTERNAL MEDICINE | Facility: CLINIC | Age: 59
End: 2024-12-26
Payer: COMMERCIAL

## 2024-12-26 DIAGNOSIS — D50.9 IRON DEFICIENCY ANEMIA, UNSPECIFIED IRON DEFICIENCY ANEMIA TYPE: Primary | ICD-10-CM

## 2024-12-27 LAB
ALBUMIN SERPL-MCNC: 4.2 G/DL (ref 3.8–4.9)
ALP SERPL-CCNC: 130 IU/L (ref 44–121)
ALT SERPL-CCNC: 32 IU/L (ref 0–32)
AST SERPL-CCNC: 31 IU/L (ref 0–40)
BASOPHILS # BLD AUTO: 0 X10E3/UL (ref 0–0.2)
BASOPHILS NFR BLD AUTO: 1 %
BILIRUB SERPL-MCNC: 0.6 MG/DL (ref 0–1.2)
BUN SERPL-MCNC: 11 MG/DL (ref 6–24)
BUN/CREAT SERPL: 13 (ref 9–23)
CALCIUM SERPL-MCNC: 9.1 MG/DL (ref 8.7–10.2)
CHLORIDE SERPL-SCNC: 100 MMOL/L (ref 96–106)
CO2 SERPL-SCNC: 28 MMOL/L (ref 20–29)
CREAT SERPL-MCNC: 0.87 MG/DL (ref 0.57–1)
EGFRCR SERPLBLD CKD-EPI 2021: 77 ML/MIN/1.73
EOSINOPHIL # BLD AUTO: 0.1 X10E3/UL (ref 0–0.4)
EOSINOPHIL NFR BLD AUTO: 2 %
ERYTHROCYTE [DISTWIDTH] IN BLOOD BY AUTOMATED COUNT: 16.3 % (ref 11.7–15.4)
FERRITIN SERPL-MCNC: 109 NG/ML (ref 15–150)
GLOBULIN SER CALC-MCNC: 2 G/DL (ref 1.5–4.5)
GLUCOSE SERPL-MCNC: 84 MG/DL (ref 70–99)
HCT VFR BLD AUTO: 42.4 % (ref 34–46.6)
HGB BLD-MCNC: 13.4 G/DL (ref 11.1–15.9)
IMM GRANULOCYTES # BLD AUTO: 0 X10E3/UL (ref 0–0.1)
IMM GRANULOCYTES NFR BLD AUTO: 0 %
IRON SATN MFR SERPL: 24 % (ref 15–55)
IRON SERPL-MCNC: 73 UG/DL (ref 27–159)
LYMPHOCYTES # BLD AUTO: 1.4 X10E3/UL (ref 0.7–3.1)
LYMPHOCYTES NFR BLD AUTO: 25 %
MCH RBC QN AUTO: 28.8 PG (ref 26.6–33)
MCHC RBC AUTO-ENTMCNC: 31.6 G/DL (ref 31.5–35.7)
MCV RBC AUTO: 91 FL (ref 79–97)
MONOCYTES # BLD AUTO: 0.4 X10E3/UL (ref 0.1–0.9)
MONOCYTES NFR BLD AUTO: 7 %
NEUTROPHILS # BLD AUTO: 3.7 X10E3/UL (ref 1.4–7)
NEUTROPHILS NFR BLD AUTO: 65 %
PLATELET # BLD AUTO: 201 X10E3/UL (ref 150–450)
POTASSIUM SERPL-SCNC: 4 MMOL/L (ref 3.5–5.2)
PROT SERPL-MCNC: 6.2 G/DL (ref 6–8.5)
RBC # BLD AUTO: 4.65 X10E6/UL (ref 3.77–5.28)
SODIUM SERPL-SCNC: 140 MMOL/L (ref 134–144)
TIBC SERPL-MCNC: 305 UG/DL (ref 250–450)
UIBC SERPL-MCNC: 232 UG/DL (ref 131–425)
WBC # BLD AUTO: 5.6 X10E3/UL (ref 3.4–10.8)

## 2025-01-03 DIAGNOSIS — G43.701 CHRONIC MIGRAINE WITHOUT AURA WITH STATUS MIGRAINOSUS, NOT INTRACTABLE: ICD-10-CM

## 2025-01-06 LAB
AMBRY INTERPRETATION REPORT: NORMAL
GENE DIS ANL INTERP-IMP: NORMAL

## 2025-01-06 RX ORDER — HYDROCHLOROTHIAZIDE 25 MG/1
25 TABLET ORAL DAILY
Qty: 30 TABLET | Refills: 1 | Status: SHIPPED | OUTPATIENT
Start: 2025-01-06

## 2025-01-07 ENCOUNTER — LAB (OUTPATIENT)
Dept: LAB | Facility: HOSPITAL | Age: 60
End: 2025-01-07
Payer: COMMERCIAL

## 2025-01-07 ENCOUNTER — OFFICE VISIT (OUTPATIENT)
Dept: ONCOLOGY | Facility: CLINIC | Age: 60
End: 2025-01-07
Payer: COMMERCIAL

## 2025-01-07 ENCOUNTER — TRANSCRIBE ORDERS (OUTPATIENT)
Dept: ADMINISTRATIVE | Facility: HOSPITAL | Age: 60
End: 2025-01-07
Payer: COMMERCIAL

## 2025-01-07 VITALS
WEIGHT: 174.7 LBS | BODY MASS INDEX: 28.08 KG/M2 | DIASTOLIC BLOOD PRESSURE: 78 MMHG | HEART RATE: 64 BPM | HEIGHT: 66 IN | RESPIRATION RATE: 16 BRPM | TEMPERATURE: 97 F | SYSTOLIC BLOOD PRESSURE: 118 MMHG

## 2025-01-07 DIAGNOSIS — M32.19 OTHER ORGAN OR SYSTEM INVOLVEMENT IN SYSTEMIC LUPUS ERYTHEMATOSUS: ICD-10-CM

## 2025-01-07 DIAGNOSIS — R74.8 ELEVATED ALKALINE PHOSPHATASE LEVEL: ICD-10-CM

## 2025-01-07 DIAGNOSIS — Z79.899 NEED FOR PROPHYLACTIC CHEMOTHERAPY: ICD-10-CM

## 2025-01-07 DIAGNOSIS — Z79.899 NEED FOR PROPHYLACTIC CHEMOTHERAPY: Primary | ICD-10-CM

## 2025-01-07 DIAGNOSIS — D69.6 THROMBOCYTOPENIA: ICD-10-CM

## 2025-01-07 DIAGNOSIS — D50.9 IRON DEFICIENCY ANEMIA, UNSPECIFIED IRON DEFICIENCY ANEMIA TYPE: Primary | ICD-10-CM

## 2025-01-07 DIAGNOSIS — D72.819 LEUKOPENIA, UNSPECIFIED TYPE: ICD-10-CM

## 2025-01-07 LAB
ALBUMIN SERPL-MCNC: 4.3 G/DL (ref 3.5–5.2)
ALBUMIN/GLOB SERPL: 1.5 G/DL
ALP SERPL-CCNC: 140 U/L (ref 39–117)
ALT SERPL W P-5'-P-CCNC: 30 U/L (ref 1–33)
ANION GAP SERPL CALCULATED.3IONS-SCNC: 7 MMOL/L (ref 5–15)
AST SERPL-CCNC: 32 U/L (ref 1–32)
BASOPHILS # BLD AUTO: 0.04 10*3/MM3 (ref 0–0.2)
BASOPHILS NFR BLD AUTO: 0.8 % (ref 0–1.5)
BILIRUB SERPL-MCNC: 0.8 MG/DL (ref 0–1.2)
BUN SERPL-MCNC: 10 MG/DL (ref 6–20)
BUN/CREAT SERPL: 14.7 (ref 7–25)
CALCIUM SPEC-SCNC: 9.5 MG/DL (ref 8.6–10.5)
CHLORIDE SERPL-SCNC: 99 MMOL/L (ref 98–107)
CO2 SERPL-SCNC: 32 MMOL/L (ref 22–29)
CREAT SERPL-MCNC: 0.68 MG/DL (ref 0.57–1)
DEPRECATED RDW RBC AUTO: 53.3 FL (ref 37–54)
EGFRCR SERPLBLD CKD-EPI 2021: 100.5 ML/MIN/1.73
EOSINOPHIL # BLD AUTO: 0.14 10*3/MM3 (ref 0–0.4)
EOSINOPHIL NFR BLD AUTO: 2.7 % (ref 0.3–6.2)
ERYTHROCYTE [DISTWIDTH] IN BLOOD BY AUTOMATED COUNT: 16.1 % (ref 12.3–15.4)
GLOBULIN UR ELPH-MCNC: 2.8 GM/DL
GLUCOSE SERPL-MCNC: 87 MG/DL (ref 65–99)
HCT VFR BLD AUTO: 43.7 % (ref 34–46.6)
HGB BLD-MCNC: 14.1 G/DL (ref 12–15.9)
IMM GRANULOCYTES # BLD AUTO: 0.02 10*3/MM3 (ref 0–0.05)
IMM GRANULOCYTES NFR BLD AUTO: 0.4 % (ref 0–0.5)
LYMPHOCYTES # BLD AUTO: 1.23 10*3/MM3 (ref 0.7–3.1)
LYMPHOCYTES NFR BLD AUTO: 23.5 % (ref 19.6–45.3)
MCH RBC QN AUTO: 28.7 PG (ref 26.6–33)
MCHC RBC AUTO-ENTMCNC: 32.3 G/DL (ref 31.5–35.7)
MCV RBC AUTO: 89 FL (ref 79–97)
MONOCYTES # BLD AUTO: 0.41 10*3/MM3 (ref 0.1–0.9)
MONOCYTES NFR BLD AUTO: 7.8 % (ref 5–12)
NEUTROPHILS NFR BLD AUTO: 3.4 10*3/MM3 (ref 1.7–7)
NEUTROPHILS NFR BLD AUTO: 64.8 % (ref 42.7–76)
NRBC BLD AUTO-RTO: 0 /100 WBC (ref 0–0.2)
PLATELET # BLD AUTO: 201 10*3/MM3 (ref 140–450)
PMV BLD AUTO: 11.2 FL (ref 6–12)
POTASSIUM SERPL-SCNC: 3.7 MMOL/L (ref 3.5–5.2)
PROT SERPL-MCNC: 7.1 G/DL (ref 6–8.5)
RBC # BLD AUTO: 4.91 10*6/MM3 (ref 3.77–5.28)
SODIUM SERPL-SCNC: 138 MMOL/L (ref 136–145)
WBC NRBC COR # BLD AUTO: 5.24 10*3/MM3 (ref 3.4–10.8)

## 2025-01-07 PROCEDURE — 36415 COLL VENOUS BLD VENIPUNCTURE: CPT

## 2025-01-07 PROCEDURE — 85025 COMPLETE CBC W/AUTO DIFF WBC: CPT

## 2025-01-07 PROCEDURE — 80053 COMPREHEN METABOLIC PANEL: CPT

## 2025-01-07 PROCEDURE — 99214 OFFICE O/P EST MOD 30 MIN: CPT | Performed by: NURSE PRACTITIONER

## 2025-01-07 NOTE — PROGRESS NOTES
[facility] HEMATOLOGY & ONCOLOGY [place]  [address]  [facility] [address] [phone]    Patient Name: Kelli Pedro  Encounter Date: 01/07/2025   YOB: 1965  Patient Number: [id number]    PROGRESS NOTES    HISTORY OF PRESENT ILLNESS: Kelli Pedro is a 59 y.o. female followed for decreased platelets    She has health history significant for hypothyroidism, Graves, Sjorgens, Lupus, Hx of DVT approx 2014, venous insufficiency and LE edema. She has family history for thrombus including her both parents.    She complains of persistent edema, increased bruising, petechia, increased fatigue, headaches for the past 2 years.  She reports her platelet count is low intermittently.  There are no petechia noted today however patient did show photos of a prior occurrence.     She was seen by Hematology at [facility] on 10/18/22, LAQUITA Schwab    Pt had an abnormal mammogram on 11/2/22.   Follow up mammogram and ultrasound of left breast with BI-RADS 4A, low suspicion with recommendation for biopsy.  Pt has been scheduled for Mammogram in 6 months per Dr. Jarvis.  Pt reports her mother had breast cancer diagnosed at age 75.  TC Score on consultation was 21.    Saw Dr. Washington 3/13/24:   thoracic aortic aneurysm in both the aortic root and ascending opposition. thoracic aortic aneurysm in both the aortic root and ascending opposition. On review of her imaging, her aneurysm is stable in size with her ascending aorta measuring primarily 4.7 cm in size. We did discuss that there can be some variation in measurements on an interval between 2 different studies. I explained the rationale and my interpretation as to why that may be observed. At this point, I recommend remaining conservative from an aneurysm point of view. She does have ongoing chest pain and shortness of breath. The description she provides to me is not consistent with an acute  aortic syndrome. We have discussed previously what signs and symptoms will be present for an acute aortic syndrome. 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 There is a description of vasospasm as well. It is noted in Dr. Bay's note to consider coronary artery bypass grafting at the time of aneurysm surgery. It was Ms. Kelli espino that she would be recommended coronary artery bypass grafting and fix her aneurysm today. To that, I spent some time discussing that typically myocardial bridging is not addressed with coronary artery bypass grafting in 2024. If it is recommended, which is very uncommonly, so we usually proceed with a unroofing procedure. This could be performed at the time of aneurysectomy. That being said, after discussion of physiology that would suggest that should not have impaired coronary flow. At this point, I struggled to suggest that this is her cause of chest pain. It is not associated with activity. It is noted primarily in the evening when she wakes up is my understanding. She does have apparently 2 to 3 bouts of this per evening. To that end, I did discuss that from an aneurysm point of view. Once again, I recommend a CT of the chest in 6 months with me. That being said, I will reach out to Dr. Bay and discuss the raise question as to potential need for bypass grafting. On review of angiography of the LAD, there is no stenosis, but I do agree there is myocardial bridging. I discussed I will reach out and discuss with her hopefully by this Friday with a reconciled consideration. Separately, she asked as to vein interventions to her leg having recently undergone a vein ablation by Dr. Huynh and was told that they could not do any more unless she has a clearance letter for an aneurysm because of her presence of an aneurysm. To that end, I did state that if it was recommended that, I would be happy  to provide a letter that would say that she is safe to undergo such a procedure. But, on further discussion, it does appear that the discussion was raised that she may need cardiac surgery and therefore, there may be benefit to preserve some  (28:19-could not dictate) for the consideration of future anticipated coronary artery bypass grafting. At this point, she will discuss with her vascular providers and I am happy to ascertain her risk from an aneurysm point of view for undergoing any vein procedures. All questions were answered to the best of my ability, and she is agreeable to the aforementioned plan. Total time is 33 minutes with greater percent time counseling the patient and including construction of this note.     3/6/24:  Per Dr. Bay Consider bypass of  left anterior descending coronary artery when has aortic surgery given abnormal SPECT     Per the medical record, she had ascending hemiarch replacement, AVR (Inspiris 25 mm), myocardial bridge resection with Dr. Wolf on 6/18/2024 at [facility] in [place]. Her postoperative course was complicated by postoperative bleeding requiring take back to the operating room for exploration. Once stabilized, she continued to progress. However, she developed intermittent AF with CHB. EP was consulted and ultimately a PPM was placed on 06/26/2024. She has recovered well and is back to work.        INTERVAL HISTORY    Pt presents to clinic today for continued follow up and to review labs.     She has no acute health issues today.  She voices inability to get medications r/t cost which is frustrating for her.     LABS    Lab Results - Last 18 Months   Lab Units 01/07/25  0814 12/26/24  0820 11/06/24  0614 11/01/24  0715 10/07/24  1046 09/20/24  1147 06/26/24  2034 06/18/24  1752 06/11/24  0806   HEMOGLOBIN g/dL 14.1 13.4 12.6 11.9* 11.7* 11.4* 9.1*   < > 12.6   HEMATOCRIT % 43.7 42.4 41.0 37.8 38.8 38.0 27.8*   < > 37.6   MCV fL 89.0 91 86.9 86.5 87.0 89.0 91.4   <  > 94.0   WBC 10*3/mm3 5.24 5.6 4.83 4.30 4.50 4.98 6.5   < > 3.66   RDW % 16.1* 16.3* 17.3* 17.2* 15.2 14.6  --   --  13.4   MPV fL 11.2  --  11.1 11.4 11.4 12.3* 12.1   < > 11.4   PLATELETS 10*3/mm3 201 201 187 145 241 238 194   < > 178   IMM GRAN % % 0.4  --  0.2 0.2 0.2 0.2  --   --  0.3   NEUTROS ABS 10*3/mm3 3.40 3.7 2.97 2.83 2.70 3.07  --   --  2.37   LYMPHS ABS 10*3/mm3 1.23 1.4 1.21 0.82 1.18 1.26  --   --  0.89   MONOS ABS 10*3/mm3 0.41 0.4 0.47 0.44 0.39 0.42  --   --  0.30   EOS ABS 10*3/mm3 0.14 0.1 0.13 0.17 0.19 0.18  --   --  0.06   BASOS ABS 10*3/mm3 0.04 0.0 0.04 0.03 0.03 0.04  --   --  0.03   IMMATURE GRANS (ABS) 10*3/mm3 0.02  --  0.01 0.01 0.01 0.01  --   --  0.01   NRBC /100 WBC 0.0  --  0.0 0.0 0.0 0.0 0.00   < > 0.0    < > = values in this interval not displayed.       Lab Results - Last 18 Months   Lab Units 01/07/25  0814 12/26/24  0820 11/06/24  0614 11/01/24  0715 10/07/24  1046 09/20/24  1147 06/26/24  2034 06/18/24  1752 06/11/24  0806   GLUCOSE mg/dL 87 84 84 89 92 76  --   --  72   SODIUM mmol/L 138 140 137 138 137 140 136   < > 139   POTASSIUM mmol/L 3.7 4.0 4.3 3.7 3.9 3.3* 4.7   < > 3.7   TOTAL CO2 mmol/L  --   --   --   --   --   --  27   < >  --    CO2 mmol/L 32.0* 28 29.0 28.0 28.0 29.0  --   --  31.0*   CHLORIDE mmol/L 99 100 100 100 103 103 101   < > 100   ANION GAP mmol/L 7.0  --  8.0 10.0 6.0 8.0 8   < > 8.0   CREATININE mg/dL 0.68 0.87 0.69 0.64 0.60 0.63 0.80   < > 0.74   BUN mg/dL 10 11 17 13 18 17 10   < > 12   BUN / CREAT RATIO  14.7 13 24.6 20.3 30.0* 27.0*  --   --  16.2   CALCIUM mg/dL 9.5 9.1 9.4 8.8 9.1 8.8 8.3*   < > 9.2   ALK PHOS U/L 140* 130* 160* 160* 185* 189*  --   --  103   TOTAL PROTEIN g/dL 7.1  --  7.2 6.2 6.9 6.8  --   --  6.7   ALT (SGPT) U/L 30 32 27 28 51* 23  --   --  13   AST (SGOT) U/L 32 31 28 34* 70* 29  --   --  16   BILIRUBIN mg/dL 0.8 0.6 0.6 0.5 0.3 0.3  --   --  0.4   ALBUMIN g/dL 4.3 4.2 4.4 3.7 3.9 4.1  --   --  4.2   GLOBULIN gm/dL 2.8   "--  2.8 2.5 3.0 2.7  --   --  2.5    < > = values in this interval not displayed.       No results for input(s): \"MSPIKE\", \"KAPPALAMB\", \"IGLFLC\", \"URICACID\", \"FREEKAPPAL\", \"CEA\", \"LDH\", \"REFLABREPO\" in the last [id number] hours.    Lab Results - Last 18 Months   Lab Units 12/26/24  0820 11/06/24  0614 09/20/24  1147 06/11/24  0806 05/22/24  0900 03/19/24  0823 12/13/23  0757 11/04/23  1805 10/05/23  1432 09/27/23  1013 09/15/23  1255   IRON mcg/dL  --  88 40 98  --  88  --   --   --  83  --    TIBC ug/dL 305 425 468 381  --  451  --   --   --  456  --    IRON SATURATION % 24  --   --   --   --   --   --   --   --   --   --    IRON SATURATION (TSAT) %  --  21 9* 26  --  19*  --   --   --  18*  --    FERRITIN ng/mL 109 132.40 14.48 60.74  --  26.83  --   --   --  18.96  --    T4 TOTAL ug/dL  --   --   --   --   --   --   --   --   --   --  7.5   TSH uIU/mL  --   --  1.460 1.990 1.190  --   --  6.2532* 3.940  --  1.130   FOLATE ng/mL  --   --   --   --   --   --  6.96  --   --   --   --          PAST MEDICAL HISTORY:  ALLERGIES:  Allergies   Allergen Reactions    Clavulanic Acid Nausea And Vomiting    Gadolinium Other (See Comments)     \"passed out and was shaking all over. Had to spend the night at hospital\"  NAUSEA/POS SYNCOPE  \"passed out and was shaking all over. Had to spend the night at hospital\"  NAUSEA/POS SYNCOPE  \"passed out and was shaking all over. Had to spend the night at hospital\"  \"passed out and was shaking all over. Had to spend the night at hospital\"  NAUSEA/POS SYNCOPE  NAUSEA/POS SYNCOPE      Verapamil Hcl Er Anaphylaxis    Zoster Vac Recomb Adjuvanted Swelling    Flagyl [Metronidazole] Diarrhea     Abdominal pain     Arava [Leflunomide] Rash     Abnormal labs     Estradiol Rash    Hydroxychloroquine GI Intolerance     Abdominal pain     Metformin Unknown - Low Severity    Prednisone Other (See Comments)     Increase in BP    Protonix [Pantoprazole] Nausea Only and Myalgia    Tetracyclines & Related " Rash    Trazodone Itching, Other (See Comments) and Unknown (See Comments)     Blurred vision  Blurred vision  Blurred vision  Blurred vision       CURRENT MEDICATIONS:  Outpatient Encounter Medications as of 1/7/2025   Medication Sig Dispense Refill    aspirin 81 MG chewable tablet Chew 1 tablet Daily.      atorvastatin (LIPITOR) 40 MG tablet Take 1 tablet by mouth Daily.      Continuous Glucose  (FreeStyle Aydin 2 Cheriton) device Use 1 each Continuous.      Continuous Glucose Sensor (FreeStyle Aydin 2 Sensor) misc Use 1 each Every 14 (Fourteen) Days.      eszopiclone (LUNESTA) 3 MG tablet Take 1 tablet by mouth Every Night. Take immediately before bedtime 90 tablet 1    ferrous sulfate 325 (65 FE) MG tablet Take 1 tablet by mouth 3 (Three) Times a Week. 36 tablet 1    fluticasone (FLONASE) 50 MCG/ACT nasal spray 2 sprays into the nostril(s) as directed by provider Daily. 16 g 1    gabapentin (NEURONTIN) 300 MG capsule Take 1 capsule by mouth 3 (Three) Times a Day. 90 capsule 1    Galcanezumab-gnlm (Emgality) 120 MG/ML solution prefilled syringe Inject 1 mL under the skin into the appropriate area as directed Every 30 (Thirty) Days. 1 mL 5    hydroCHLOROthiazide 25 MG tablet Take 1 tablet by mouth Daily. 30 tablet 1    hydrOXYzine (ATARAX) 25 MG tablet Take 1 tablet by mouth Every 6 (Six) Hours As Needed for Anxiety. 120 tablet 1    lansoprazole (PREVACID) 30 MG capsule Take 1 capsule by mouth Daily. 90 capsule 3    levothyroxine (Synthroid) 75 MCG tablet Take 1/2 tablet on Wednesdays. Skip Sunday dose. Take one tablet daily Kytomm-Sqifktl-Qblfdjac-Friday-Saturday. 90 tablet 3    lidocaine (LIDODERM) 5 % Place 1 patch on the skin as directed by provider Daily. Remove & Discard patch within 12 hours or as directed by MD 14 each 1    linaclotide (Linzess) 145 MCG capsule capsule Take 1 capsule by mouth Every Morning Before Breakfast. 90 capsule 1    magnesium oxide (MAG-OX) 400 MG tablet Take 1 tablet by mouth  Daily. 90 tablet 1    metaxalone (SKELAXIN) 800 MG tablet Take 1 tablet by mouth 3 (Three) Times a Day As Needed for Muscle Spasms. 90 tablet 5    mycophenolate (CELLCEPT) 500 MG tablet Take 1 tablet by mouth Daily.      Narcan 4 MG/0.1ML nasal spray Administer 1 spray into the nostril(s) as directed by provider As Needed.      nitroglycerin (NITROSTAT) 0.4 MG SL tablet Place 1 tablet under the tongue Every 5 (Five) Minutes As Needed for Chest Pain. Take no more than 3 doses in 15 minutes. 100 tablet 2    ondansetron ODT (ZOFRAN-ODT) 4 MG disintegrating tablet PLACE ONE TABLET ON THE TOP OF TONGUE EVERY 8 HOURS AS NEEDED FOR NAUSEA OR FOR VOMITING 30 tablet 5    PARoxetine (Paxil) 20 MG tablet Take 1 tablet by mouth Every Morning. 90 tablet 1    pilocarpine (SALAGEN) 5 MG tablet Take 1 tablet by mouth 3 (Three) Times a Day.      potassium chloride (K-DUR,KLOR-CON) 10 MEQ CR tablet Take 1 tablet by mouth 2 (Two) Times a Day.      [START ON 1/16/2025] Semaglutide, 1 MG/DOSE, (OZEMPIC) 4 MG/3ML solution pen-injector Inject 1 mg under the skin into the appropriate area as directed 1 (One) Time Per Week. 3 mL 0    ubrogepant (UBRELVY) 100 MG tablet Take 1 tablet at the onset of headache. May repeat dose x1 in 2 hours if headache is not resolved. Max dose 200 mg/24 hours. 30 tablet 2     No facility-administered encounter medications on file as of 1/7/2025.     ADULT ILLNESSES:  Patient Active Problem List   Diagnosis Code    Hashimoto's disease E06.3    Vitamin D deficiency E55.9    Overweight (BMI 25.0-29.9) E66.3    Breast calcifications R92.1    Pedal edema R60.0    Venous insufficiency I87.2    GONZALEZ (dyspnea on exertion) R06.09    Bicuspid aortic valve Q23.81    Family history of early CAD Z82.49    Abnormal ECG R94.31    RBBB I45.10    Precordial pain R07.2    Thoracic aortic aneurysm without rupture I71.20    Status post laparoscopic sleeve gastrectomy Z98.84    Hypertension I10    Abnormal nuclear stress test  R94.39    Stable angina I20.89    Chest pain, atypical R07.89    Coronary artery vasospasm I20.1    Myocardial bridge Q24.5    Other insomnia G47.09    Anxiety and depression F41.9, F32.A    Neuropathy G62.9    Lumbar facet arthropathy M47.816    Vitamin B 12 deficiency E53.8    Prediabetes R73.03    Positive STEVE (antinuclear antibody) R76.8    Polyarthralgia M25.50    H/O heart surgery Z98.890    Presence of cardiac pacemaker Z95.0    Iron deficiency anemia D50.9    Pleurisy R09.1    Right-sided chest pain R07.9    S/P AVR (aortic valve replacement) Z95.2    Mixed hyperlipidemia E78.2    CHB (complete heart block) I44.2       HEALTH MAINTENANCE ITEMS:  Health Maintenance Due   Topic Date Due    DIABETIC EYE EXAM  Never done    COVID-19 Vaccine (6 - 2024-25 season) 09/01/2024    BMI FOLLOWUP  11/27/2024    URINE MICROALBUMIN  12/13/2024       <no information>  Last Completed Colonoscopy            COLORECTAL CANCER SCREENING (COLONOSCOPY - Every 10 Years) Tentatively due on 4/24/2033 04/24/2023  SCANNED - COLONOSCOPY    06/04/2021  COLONOSCOPY (Done - in care everywhere)    06/04/2021  Outside Procedure: COLONOSCOPY    01/01/2021  Outside Procedure: COLONOSCOPY    04/04/2016  Outside Procedure: COLONOSCOPY    Only the first 5 history entries have been loaded, but more history exists.                  Immunization History   Administered Date(s) Administered    COVID-19 (PFIZER) BIVALENT 12+YRS 11/18/2022    COVID-19 (PFIZER) Purple Cap Monovalent 01/23/2021, 02/13/2021, 01/22/2022, 06/13/2022    Flu Vaccine Quad PF >36MO 03/13/2023    Flu Vaccine Split Quad 10/15/2016, 09/21/2021    Flublok 18+yrs 10/01/2019    Fluzone  >6mos 10/15/2016, 10/15/2017, 09/26/2024    Fluzone (or Fluarix & Flulaval for VFC) >6mos 09/16/2015, 09/11/2018, 11/04/2020, 12/13/2023    Hepatitis B Adult/Adolescent IM 08/17/2013, 10/08/2013, 02/13/2014    Influenza Whole 10/03/2016    Influenza, Unspecified 09/16/2015, 10/15/2016,  10/15/2017, 09/11/2018, 10/01/2019, 11/04/2020, 09/21/2021, 10/05/2022    PPD Test 08/19/2014, 09/02/2014    Pneumococcal Conjugate 13-Valent (PCV13) 07/15/2019    Pneumococcal Conjugate 20-Valent (PCV20) 09/26/2024    Pneumococcal Polysaccharide (PPSV23) 10/23/2016    Pneumococcal, Unspecified 10/23/2016    Shingrix 02/08/2019    Tdap 03/27/2013, 05/21/2023    Tetanus 01/01/2000    Tetanus Toxoid 01/01/2000    Tetanus Toxoid, Not Adsorbed 01/01/2000     Last Completed Mammogram            Ordered - MAMMOGRAM (Every 2 Years) Ordered on 1/12/2024 12/20/2024  Mammo Screening Digital Tomosynthesis Bilateral With CAD    12/13/2023  Mammo Diagnostic Digital Tomosynthesis Bilateral With CAD    05/15/2023  Mammo Diagnostic Digital Tomosynthesis Left With CAD    12/06/2022  Mammo Diagnostic Digital Tomosynthesis Left With CAD    11/08/2022  Mammo diagnostic digital tomosynthesis left w CAD    Only the first 5 history entries have been loaded, but more history exists.                      FAMILY HISTORY:  Family History   Problem Relation Age of Onset    Breast cancer Mother 70    Diabetes Mother     Hearing loss Mother     Heart disease Mother     Hyperlipidemia Mother     Vision loss Mother     Clotting disorder Mother     Fainting Mother     Hypertension Mother     Cancer Mother     Migraines Mother     Inflammatory bowel disease Mother     Scleroderma Father     Diabetes Father     Hearing loss Father     Heart disease Father     Hyperlipidemia Father     Vision loss Father     Clotting disorder Father     Fainting Father     Heart attack Father     Hypertension Father     Cancer Father     Stroke Father     Heart valve disorder Father     Colon polyps Father     Inflammatory bowel disease Father     Ulcerative colitis Father     Diabetes Sister     Hyperlipidemia Sister     Vision loss Sister     Asthma Sister     Hypertension Sister     Thyroid disease Sister     Hyperlipidemia Brother     Vision loss Brother      "Asthma Brother     Hypertension Brother     Diabetes Brother     Early death Brother     Colon cancer Neg Hx      SOCIAL HISTORY:  Social History     Socioeconomic History    Marital status:    Tobacco Use    Smoking status: Former     Current packs/day: 0.25     Average packs/day: 0.3 packs/day for 43.6 years (10.9 ttl pk-yrs)     Types: Cigarettes     Start date: 1/1/1991     Quit date: 1/1/1987     Passive exposure: Past    Smokeless tobacco: Never    Tobacco comments:     [id number] for 6 months only   Vaping Use    Vaping status: Never Used   Substance and Sexual Activity    Alcohol use: Not Currently     Comment: Socially very rare    Drug use: Never    Sexual activity: Yes     Partners: Male     Birth control/protection: None, Partner of same sex     Comment: Thats one male patner i live with       REVIEW OF SYSTEMS:  Review of Systems   Constitutional:  Positive for fatigue. Negative for fever.   HENT:  Negative for trouble swallowing.    Respiratory:  Negative for cough and shortness of breath.    Cardiovascular:  Positive for leg swelling. Negative for chest pain and palpitations.   Gastrointestinal:  Negative for nausea and vomiting.   Genitourinary:  Negative for hematuria.   Musculoskeletal:  Negative for arthralgias and myalgias.   Skin:  Positive for bruise. Negative for rash, skin lesions and wound.   Neurological:  Negative for dizziness, syncope, memory problem and confusion.   Psychiatric/Behavioral:  Negative for suicidal ideas and depressed mood. The patient is not nervous/anxious.        /78   Pulse 64   Temp 97 °F (36.1 °C)   Resp 16   Ht 167.6 cm (66\")   Wt 79.2 kg (174 lb 11.2 oz)   BMI 28.20 kg/m²  Body surface area is 1.89 meters squared.    Pain Score    01/07/25 0826   PainSc: 0-No pain         Physical Exam  Constitutional:       Appearance: Normal appearance.   HENT:      Head: Normocephalic and atraumatic.   Cardiovascular:      Rate and Rhythm: Normal rate and " regular rhythm.   Pulmonary:      Effort: Pulmonary effort is normal.      Breath sounds: Normal breath sounds.   Abdominal:      General: Bowel sounds are normal.      Palpations: Abdomen is soft.   Musculoskeletal:      Right lower leg: No edema.      Left lower leg: No edema.   Skin:     General: Skin is warm and dry.   Neurological:      Mental Status: She is alert and oriented to person, place, and time.   Psychiatric:         Attention and Perception: Attention normal.         Mood and Affect: Mood normal.         Judgment: Judgment normal.         Kelli Pedro reports a pain score of 0.  Given her pain assessment as noted, treatment options were discussed and the following options were decided upon as a follow-up plan to address the patient's pain: use of non-medical modalities (ice, heat, stretching and/or behavior modifications) and NO intervention indicated .      ASSESSMENT / PLAN:    1. Iron deficiency anemia, unspecified iron deficiency anemia type    2. Leukopenia, unspecified type    3. Thrombocytopenia    4. Elevated alkaline phosphatase level        S/p ascending hemiarch replacement, AVR (Inspiris 25 mm), myocardial bridge resection with Dr. Wolf on 6/18/2024 and pacemaker placement on  6/26/24       1.  Thrombocytopenia  2.  Leukopenia   -All work up was WNL other than Factor VIII Activity elevated at 162  -Pt has hx of DVT.  States she was on ASA for 6 months   -CBC WNL today.     3. Anemia   -Received Ferrlecit 250 mg on 10/3, 8, 15, 2025  -Iron has been stable since.   -Labs are WNL today.     4.  Elevated Alk Phos  -Pt is followed by Gastro     Pt sees Dr. Sr in [place], [place] for Rheumatology Has appt next week.     Pt was given Patient Assistance Applications for a few of her medications.  We discussed how to complete them.  She is aware she should take them to the appropriate prescriber for completion before submission.      PLAN:   Continue current medications, treatment plans  and follow up with PCP and any other providers  Labs only in 6  months.  Return to office in 1 year  Pre-office labs for CBC, CMP, Iron Profile, Ferritin   Care discussed with patient.  Understanding expressed.  Patient agreeable with plan.          Leticia Flores, LAQUITA  01/07/2025

## 2025-01-10 RX ORDER — UBROGEPANT 100 MG/1
TABLET ORAL
Qty: 10 TABLET | Refills: 0 | Status: SHIPPED | OUTPATIENT
Start: 2025-01-10 | End: 2025-02-11

## 2025-01-10 NOTE — TELEPHONE ENCOUNTER
Sherry Shila has requested a refill on her medication.      Last office visit : 3/6/2024   Next office visit : Visit date not found   Last medication refill :2/21/2024         Requested Prescriptions     Pending Prescriptions Disp Refills    UBRELVY 100 MG TABS [Pharmacy Med Name: Ubrelvy 100 mg tablet] 10 tablet 10     Sig: TAKE ONE TABLET BY MOUTH AS NEEDED. MAX OF TWO TABLETS IN 24 HOURS.

## 2025-01-12 DIAGNOSIS — R11.0 NAUSEA: ICD-10-CM

## 2025-01-13 RX ORDER — HYDROCHLOROTHIAZIDE 25 MG/1
25 TABLET ORAL DAILY
Qty: 30 TABLET | Refills: 1 | OUTPATIENT
Start: 2025-01-13

## 2025-01-13 RX ORDER — ONDANSETRON 4 MG/1
4 TABLET, ORALLY DISINTEGRATING ORAL EVERY 8 HOURS PRN
Qty: 30 TABLET | Refills: 5 | Status: SHIPPED | OUTPATIENT
Start: 2025-01-13

## 2025-01-14 RX ORDER — PROMETHAZINE HYDROCHLORIDE 25 MG/1
25 TABLET ORAL EVERY 6 HOURS PRN
Qty: 30 TABLET | Refills: 1 | Status: SHIPPED | OUTPATIENT
Start: 2025-01-14

## 2025-01-21 ENCOUNTER — TELEPHONE (OUTPATIENT)
Dept: INTERNAL MEDICINE | Facility: CLINIC | Age: 60
End: 2025-01-21

## 2025-01-23 NOTE — TELEPHONE ENCOUNTER
Spoke to patient and she is going to go to Urgent Care due to no availability with preferred provider. Offered appt 1/27/25 however patient going to Urgent Care

## 2025-01-23 NOTE — TELEPHONE ENCOUNTER
"  Hub staff attempted to follow warm transfer process and was unsuccessful     Caller: Kelli Pedro \"Carli\"    Relationship to patient: Self    Best call back number: [phone]     Patient is needing: PATIENT WOULD LIKE TO BE SEEN TOMORROW 01.24.2025 BY DR. GARCIA ONLY FOR RIGHT SIDE BACK PAIN AND NOT URINATING MUCH. SHE ONLY WANTS TO SPEAK TO KAIA ABOUT GETTING SCHEDULED FOR TOMORROW.     PLEASE CALL TO SCHEDULE APPOINTMENT  "

## 2025-01-24 ENCOUNTER — OFFICE VISIT (OUTPATIENT)
Dept: PRIMARY CARE CLINIC | Age: 60
End: 2025-01-24
Payer: COMMERCIAL

## 2025-01-24 VITALS
TEMPERATURE: 97.7 F | WEIGHT: 168 LBS | BODY MASS INDEX: 27.12 KG/M2 | OXYGEN SATURATION: 97 % | SYSTOLIC BLOOD PRESSURE: 120 MMHG | DIASTOLIC BLOOD PRESSURE: 64 MMHG | HEART RATE: 73 BPM

## 2025-01-24 DIAGNOSIS — J06.9 UPPER RESPIRATORY TRACT INFECTION, UNSPECIFIED TYPE: Primary | ICD-10-CM

## 2025-01-24 DIAGNOSIS — R19.7 DIARRHEA, UNSPECIFIED TYPE: ICD-10-CM

## 2025-01-24 PROCEDURE — 1036F TOBACCO NON-USER: CPT

## 2025-01-24 PROCEDURE — G8419 CALC BMI OUT NRM PARAM NOF/U: HCPCS

## 2025-01-24 PROCEDURE — 99213 OFFICE O/P EST LOW 20 MIN: CPT

## 2025-01-24 PROCEDURE — 3017F COLORECTAL CA SCREEN DOC REV: CPT

## 2025-01-24 PROCEDURE — G8427 DOCREV CUR MEDS BY ELIG CLIN: HCPCS

## 2025-01-24 RX ORDER — ALBUTEROL SULFATE 90 UG/1
2 INHALANT RESPIRATORY (INHALATION) 4 TIMES DAILY PRN
Qty: 18 G | Refills: 0 | Status: SHIPPED | OUTPATIENT
Start: 2025-01-24

## 2025-01-24 RX ORDER — METHYLPREDNISOLONE 4 MG/1
TABLET ORAL
Qty: 1 KIT | Refills: 0 | Status: SHIPPED | OUTPATIENT
Start: 2025-01-24 | End: 2025-01-30

## 2025-01-24 RX ORDER — AZITHROMYCIN 250 MG/1
TABLET, FILM COATED ORAL
Qty: 6 TABLET | Refills: 0 | Status: SHIPPED | OUTPATIENT
Start: 2025-01-24 | End: 2025-02-03

## 2025-01-24 ASSESSMENT — ENCOUNTER SYMPTOMS
WHEEZING: 1
RHINORRHEA: 0
VOMITING: 0
SINUS PRESSURE: 1
NAUSEA: 1
DIARRHEA: 1
SORE THROAT: 0
ABDOMINAL PAIN: 0
COUGH: 1
SHORTNESS OF BREATH: 0

## 2025-01-24 NOTE — PATIENT INSTRUCTIONS
- Antibiotic sent to the pharmacy, take as directed.  - Steroid pack sent to the pharmacy, side effects discussed.  - Albuterol inhaler as needed for wheezing.  - OTC Flonase and Afrin as needed- do not use Afrin more than 3 days in a row.  - OTC oral antihistamine and Mucinex as needed.  - Rest.  - Increase fluid intake.  - Tylenol/Motrin as needed.  - May use saline saltwater nasal washes.  - Breathe warm, moist air. You can use a steamy shower, a hot bath, or a sink filled with hot water.   - Avoid cold, dry air.  - Place humidifier next to bed while sleeping.   - Return to the clinic or follow up with PCP if symptoms worsen or fail to improve.

## 2025-01-24 NOTE — PROGRESS NOTES
[facility]  [facility] [address]  [address]  [place] [address]  Dept: [phone]  Dept Fax: [phone]  Loc: [phone]    Sherry Fuchs is a 59 y.o. female who presents today for her medical conditions/complaints as noted below.  Sherry Fuchs is c/o of Congestion, Diarrhea, Headache, Nausea, and Cough        HPI:     Sherry Fuchs presents with complaints of nasal congestion headache and cough. Reports symptoms started over 1 week ago. Patient is now having diarrhea and nausea started 2-3 days ago. Denies any OTC treatment. No known sick contact.    Denies any recent antibiotic or steroid administration.      Past Medical History:   Diagnosis Date    Anxiety     Bicuspid aortic valve     Deep vein thrombosis (HCC)     Diabetes mellitus (HCC)     GERD (gastroesophageal reflux disease)     H/O Sjogren's disease (HCC)     Hashimoto's thyroiditis     Headache     Heart disease     Hyperthyroidism     Lupus     Myasthenia gravis (HCC)      Past Surgical History:   Procedure Laterality Date    BACK SURGERY      cage, jethro and screws     CARDIAC SURGERY      CARPAL TUNNEL RELEASE Bilateral     COLONOSCOPY  2021    [facility]    ESOPHAGEAL DILATATION      GASTRIC RESTRICTION SURGERY      sleeve    HYSTERECTOMY, VAGINAL      SHOULDER SURGERY      SPINAL FUSION      UPPER GASTROINTESTINAL ENDOSCOPY  2019    In [place]       Family History   Problem Relation Age of Onset    Coronary Art Dis Mother     Breast Cancer Mother     Colon Polyps Mother     Colon Polyps Father     Prostate Cancer Father     Diabetes Father     Coronary Art Dis Father     High Blood Pressure Father     High Cholesterol Father     High Blood Pressure Sister     High Blood Pressure Brother     COPD Maternal Aunt     Breast Cancer Paternal Aunt     COPD Maternal Cousin     Lung Cancer Maternal Cousin        Social History     Tobacco Use    Smoking status: Never    Smokeless tobacco: Never

## 2025-01-27 ENCOUNTER — TELEPHONE (OUTPATIENT)
Dept: OTOLARYNGOLOGY | Facility: CLINIC | Age: 60
End: 2025-01-27
Payer: COMMERCIAL

## 2025-01-27 NOTE — TELEPHONE ENCOUNTER
"    Hub staff attempted to follow warm transfer process and was unsuccessful     Caller: Kelli Pedro \"Carli\"    Relationship to patient: Self    Best call back number: [phone] (home)       Patient is needing: REQUESTING SOONER APPT THAN 2.17.25. DUE TO BEING ABLE TO HEAR HER SELF TALK. EARWAX IS  WORSENING.      "

## 2025-01-28 NOTE — TELEPHONE ENCOUNTER
Spoke to patient and offered appointment for tomorrow 1/29 and patient declined she states she has to work but ear is feeling alittle better today. She has upper respiratory infection and thinks the meds she is on for that is helping.

## 2025-01-30 DIAGNOSIS — F06.4 ANXIETY DISORDER DUE TO MEDICAL CONDITION: ICD-10-CM

## 2025-01-30 DIAGNOSIS — G43.701 CHRONIC MIGRAINE WITHOUT AURA WITH STATUS MIGRAINOSUS, NOT INTRACTABLE: ICD-10-CM

## 2025-01-30 RX ORDER — PAROXETINE 20 MG/1
20 TABLET, FILM COATED ORAL EVERY MORNING
Qty: 90 TABLET | Refills: 1 | Status: SHIPPED | OUTPATIENT
Start: 2025-01-30

## 2025-01-30 RX ORDER — HYDROXYZINE HYDROCHLORIDE 25 MG/1
25 TABLET, FILM COATED ORAL EVERY 6 HOURS PRN
Qty: 120 TABLET | Refills: 1 | Status: SHIPPED | OUTPATIENT
Start: 2025-01-30

## 2025-02-05 ENCOUNTER — OFFICE VISIT (OUTPATIENT)
Dept: INTERNAL MEDICINE | Facility: CLINIC | Age: 60
End: 2025-02-05
Payer: COMMERCIAL

## 2025-02-05 VITALS
TEMPERATURE: 97.2 F | WEIGHT: 164 LBS | SYSTOLIC BLOOD PRESSURE: 128 MMHG | BODY MASS INDEX: 26.36 KG/M2 | DIASTOLIC BLOOD PRESSURE: 86 MMHG | OXYGEN SATURATION: 98 % | HEART RATE: 74 BPM | HEIGHT: 66 IN

## 2025-02-05 DIAGNOSIS — R06.09 DYSPNEA ON EXERTION: Primary | ICD-10-CM

## 2025-02-05 PROCEDURE — 99213 OFFICE O/P EST LOW 20 MIN: CPT

## 2025-02-05 NOTE — PROGRESS NOTES
Subjective     Chief Complaint:  Shortness of breath    HPI:  Patient presents today for the above problems. Please see assessment and plan below.    Past Medical History:   Past Medical History:   Diagnosis Date    AAA (abdominal aortic aneurysm)     Abnormal ECG 03/14/2023    Allergic     Anemia     Angina pectoris     Anxiety     Anxiety and depression 06/25/2024    Aortic valve replaced 07/29/2024    Arthritis     Asthma     Bicuspid aortic valve 03/14/2023    Cataract     Surgery 2022    Cholelithiasis     Chronic kidney disease     Clotting disorder     Seeing Leticia Flores at [facility] currently    Colon polyp     Coronary artery disease     Deep vein thrombosis     Dental disease     Diabetes mellitus     Diverticulitis of colon     Diverticulosis     GERD (gastroesophageal reflux disease)     Headache     Heart murmur     Hernia     History of medical problems     Aortic aneurysms    HL (hearing loss)     Hypertension     Hyperthyroidism     Hypothyroid     Irritable bowel syndrome     Kidney stone     Previous haf lithotripsy    Low back pain     Lupus     Mitral valve prolapse     In [facility] Dr. Claudio Gibson    Mixed hyperlipidemia 11/18/2024    Neuromuscular disorder     NSTEMI (non-ST elevated myocardial infarction) 12/26/2023    Obesity     Osteopenia     Pancreatitis     Peptic ulcer disease     Peptic ulceration     Pneumonia     RBBB 03/14/2023    Scoliosis     Sinusitis     Sjogren's disease     Sleep apnea     Years ago, but no longer have since Gastric Sleeve Surgery    Tinnitus     Ulcerative colitis     Urinary tract infection     Visual impairment      Past Surgical History:  Past Surgical History:   Procedure Laterality Date    ABDOMINAL AORTIC ANEURYSM REPAIR  06/18/2024    ABDOMINAL SURGERY      ANKLE SURGERY      ASCENDING ARCH/HEMIARCH REPLACEMENT  06/18/2024    BACK SURGERY      BARIATRIC SURGERY      CARDIAC CATHETERIZATION N/A 12/26/2023    Procedure: Left  "Heart Cath;  Surgeon: Pablito De Leon DO;  Location:  [facility];  Service: Cardiovascular;  Laterality: N/A;    CARDIAC CATHETERIZATION  12/26/2023    CARDIAC SURGERY  06/2024    CARDIAC VALVE REPLACEMENT  06/2024    CARPAL TUNNEL RELEASE Bilateral     CHOLECYSTECTOMY      COLONOSCOPY  04/24/2023    normal colon consistent with ibs    ENDOSCOPY  04/24/2023    large hitial hernia,chronic gastritis    EYE SURGERY  2022    Cataracts both eyes    FLEXIBLE SIGMOIDOSCOPY      FOOT SURGERY      GALLBLADDER SURGERY      HYSTERECTOMY      PACEMAKER IMPLANTATION  06/2024    SHOULDER SURGERY      SUBTOTAL HYSTERECTOMY      TUBAL ABDOMINAL LIGATION      UPPER GASTROINTESTINAL ENDOSCOPY      VEIN SURGERY      [facility] MyChart       Allergies:  Allergies   Allergen Reactions    Clavulanic Acid Nausea And Vomiting    Gadolinium Other (See Comments)     \"passed out and was shaking all over. Had to spend the night at hospital\"  NAUSEA/POS SYNCOPE  \"passed out and was shaking all over. Had to spend the night at hospital\"  NAUSEA/POS SYNCOPE  \"passed out and was shaking all over. Had to spend the night at hospital\"  \"passed out and was shaking all over. Had to spend the night at hospital\"  NAUSEA/POS SYNCOPE  NAUSEA/POS SYNCOPE      Verapamil Hcl Er Anaphylaxis    Zoster Vac Recomb Adjuvanted Swelling    Flagyl [Metronidazole] Diarrhea     Abdominal pain     Arava [Leflunomide] Rash     Abnormal labs     Estradiol Rash    Hydroxychloroquine GI Intolerance     Abdominal pain     Metformin Unknown - Low Severity    Prednisone Other (See Comments)     Increase in BP    Protonix [Pantoprazole] Nausea Only and Myalgia    Tetracyclines & Related Rash    Trazodone Itching, Other (See Comments) and Unknown (See Comments)     Blurred vision  Blurred vision  Blurred vision  Blurred vision       Medications:  Prior to Admission medications    Medication Sig Start Date End Date Taking? Authorizing " Provider   aspirin 81 MG chewable tablet Chew 1 tablet Daily. 6/18/24 6/29/25 Yes Liss English MD   atorvastatin (LIPITOR) 40 MG tablet Take 1 tablet by mouth Daily. 6/18/24 6/28/25 Yes Liss English MD   Continuous Glucose  (FreeStyle Aydin 2 He[place]in) device Use 1 each Continuous.   Yes Liss English MD   Continuous Glucose Sensor (FreeStyle Aydin 2 Sensor) misc Use 1 each Every 14 (Fourteen) Days.   Yes Liss English MD   eszopiclone (LUNESTA) 3 MG tablet Take 1 tablet by mouth Every Night. Take immediately before bedtime 9/20/24  Yes ELLEN Tidwell DO   ferrous sulfate 325 (65 FE) MG tablet Take 1 tablet by mouth 3 (Three) Times a Week. 11/8/24  Yes Leticia Flores APRN   fluticasone (FLONASE) 50 MCG/ACT nasal spray 2 sprays into the nostril(s) as directed by provider Daily. 2/12/24  Yes ELLEN Tidwell DO   Galcanezumab-gnlm (Emgality) 120 MG/ML solution prefilled syringe Inject 1 mL under the skin into the appropriate area as directed Every 30 (Thirty) Days. 1/6/25  Yes ELLEN Tidwell DO   hydroCHLOROthiazide 25 MG tablet Take 1 tablet by mouth Daily. 1/6/25  Yes ELLEN Tidwell DO   hydrOXYzine (ATARAX) 25 MG tablet Take 1 tablet by mouth Every 6 (Six) Hours As Needed for Anxiety. 1/30/25  Yes ELLEN Tidwell DO   lansoprazole (PREVACID) 30 MG capsule Take 1 capsule by mouth Daily. 4/30/24  Yes ELLEN Tidwell DO   levothyroxine (Synthroid) 75 MCG tablet Take 1/2 tablet on Wednesdays. Skip Sunday dose. Take one tablet daily Ngmdii-Mfhhfum-Lodslfza-Friday-Saturday. 8/22/24  Yes ELLEN Tidwell DO   linaclotide (Linzess) 145 MCG capsule capsule Take 1 capsule by mouth Every Morning Before Breakfast. 1/31/25  Yes ELLEN Tidwell DO   magnesium oxide (MAG-OX) 400 MG tablet Take 1 tablet by mouth Daily. 4/30/24  Yes ELLEN Tidwell DO   mycophenolate (CELLCEPT) 500 MG tablet Take 1 tablet by mouth  Daily. 11/7/24  Yes Liss English MD   Narcan 4 MG/0.1ML nasal spray Administer 1 spray into the nostril(s) as directed by provider As Needed. 7/1/24  Yes Liss English MD   nitroglycerin (NITROSTAT) 0.4 MG SL tablet Place 1 tablet under the tongue Every 5 (Five) Minutes As Needed for Chest Pain. Take no more than 3 doses in 15 minutes. 3/18/24  Yes Gavino Bay MD   ondansetron ODT (ZOFRAN-ODT) 4 MG disintegrating tablet Place 1 tablet on the tongue Every 8 (Eight) Hours As Needed for Nausea or Vomiting. 1/13/25  Yes ELLEN Tidwell DO   PARoxetine (Paxil) 20 MG tablet Take 1 tablet by mouth Every Morning. 1/30/25  Yes ELLEN Tidwell DO   pilocarpine (SALAGEN) 5 MG tablet Take 1 tablet by mouth 3 (Three) Times a Day.   Yes Liss Englsih MD   potassium chloride (K-DUR,KLOR-CON) 10 MEQ CR tablet Take 1 tablet by mouth 2 (Two) Times a Day.   Yes Liss English MD   promethazine (PHENERGAN) 25 MG tablet Take 1 tablet by mouth Every 6 (Six) Hours As Needed for Nausea or Vomiting. 1/14/25  Yes ELLEN Tidwell DO   Semaglutide, 1 MG/DOSE, (OZEMPIC) 4 MG/3ML solution pen-injector Inject 1 mg under the skin into the appropriate area as directed 1 (One) Time Per Week. 1/30/25  Yes ELLEN Tidwell DO   ubrogepant (UBRELVY) 100 MG tablet Take 1 tablet at the onset of headache. May repeat dose x1 in 2 hours if headache is not resolved. Max dose 200 mg/24 hours. 2/12/24  Yes ELLEN Tidwell DO   gabapentin (NEURONTIN) 300 MG capsule Take 1 capsule by mouth 3 (Three) Times a Day. 12/6/24 2/5/25  ELLEN Tidwell DO   lidocaine (LIDODERM) 5 % Place 1 patch on the skin as directed by provider Daily. Remove & Discard patch within 12 hours or as directed by MD 12/18/24 2/5/25  ELLEN Tidwell, DO   metaxalone (SKELAXIN) 800 MG tablet Take 1 tablet by mouth 3 (Three) Times a Day As Needed for Muscle Spasms. 7/25/24 2/5/25  ELLEN Tidwell, DO  "      Objective     Vital Signs: /86 (BP Location: Left arm, Patient Position: Sitting, Cuff Size: Adult)   Pulse 74   Temp 97.2 °F (36.2 °C) (Temporal)   Ht 167.6 cm (65.98\")   Wt 74.4 kg (164 lb)   SpO2 98%   BMI 26.48 kg/m²   Physical Exam  Constitutional:       Appearance: Normal appearance.   HENT:      Right Ear: Tympanic membrane normal.      Left Ear: Tympanic membrane normal.      Mouth/Throat:      Pharynx: Posterior oropharyngeal erythema present.   Cardiovascular:      Rate and Rhythm: Normal rate.   Pulmonary:      Effort: Pulmonary effort is normal. No respiratory distress.      Breath sounds: Normal breath sounds. No wheezing or rales.   Neurological:      Mental Status: She is alert and oriented to person, place, and time. Mental status is at baseline.      Motor: No weakness.       Results Reviewed:  Reviewed pacemaker interrogation report from today which was normal.    Assessment / Plan     Assessment/Plan:  Diagnoses and all orders for this visit:    1. Dyspnea on exertion (Primary)  -     XR Chest PA & Lateral; Future       History of Present Illness  The patient is a 59-year-old female who presents today with complaints of shortness of breath.    She reports that her symptoms began approximately 2 weeks ago, prompting a visit to [facility] on 01/24/2024. At that time, her illness had persisted for about a week.  She was treated with azithromycin and Medrol Dosepak.  Since then, she has been experiencing shortness of breath, particularly during physical exertion, accompanied by sweating, pallor, and decreased oxygen saturation. She monitors her oxygen levels using a pulse oximeter at work. Over the past week, she has developed a dry cough, which has resulted in a sore throat. Her shortness of breath is primarily exertional, but she also experiences it at rest occasionally. She reports no leg swelling, which has improved since her vein procedure. She admits to wheezing at times " and having pain around her pacemaker, although a recent device check revealed no issues. She was advised to seek medical attention for possible pneumonia. She does not experience chest pain during exercise or exertion, but notes pain around her pacemaker.     Assessment & Plan  1. Dyspnea.  Her symptoms, including shortness of breath, excessive sweating, and pain around the pacemaker, necessitate further investigation to rule out pneumonia.  Lungs are clear on exam today.  A chest x-ray will be ordered for this purpose. She has been informed that she will be contacted with the results once they are available. If the chest x-ray does not reveal any abnormalities, would recommend further evaluation by cardiology.    Return for Next scheduled follow up. unless patient needs to be seen sooner or acute issues arise.    Patient or patient representative verbalized consent for the use of Ambient Listening during the visit with  LAQUITA Reagan for chart documentation. 2/5/2025  16:10 CST    I have discussed the patient results/orders and and plan/recommendation with them at today's visit.      LAQUITA Reagan   02/05/2025

## 2025-02-06 ENCOUNTER — HOSPITAL ENCOUNTER (OUTPATIENT)
Dept: GENERAL RADIOLOGY | Facility: HOSPITAL | Age: 60
Discharge: HOME | End: 2025-02-06
Payer: COMMERCIAL

## 2025-02-06 DIAGNOSIS — R06.09 DYSPNEA ON EXERTION: ICD-10-CM

## 2025-02-06 PROCEDURE — 71046 X-RAY EXAM CHEST 2 VIEWS: CPT

## 2025-02-07 DIAGNOSIS — I10 ESSENTIAL HYPERTENSION: ICD-10-CM

## 2025-02-07 DIAGNOSIS — F06.4 ANXIETY DISORDER DUE TO MEDICAL CONDITION: ICD-10-CM

## 2025-02-07 RX ORDER — HYDROXYZINE HYDROCHLORIDE 25 MG/1
25 TABLET, FILM COATED ORAL EVERY 6 HOURS PRN
Qty: 120 TABLET | Refills: 1 | OUTPATIENT
Start: 2025-02-07

## 2025-02-07 RX ORDER — LOSARTAN POTASSIUM 25 MG/1
12.5 TABLET ORAL DAILY
Qty: 45 TABLET | Refills: 1 | OUTPATIENT
Start: 2025-02-07

## 2025-02-11 ENCOUNTER — OFFICE VISIT (OUTPATIENT)
Dept: CARDIOLOGY | Facility: CLINIC | Age: 60
End: 2025-02-11
Payer: COMMERCIAL

## 2025-02-11 VITALS
OXYGEN SATURATION: 96 % | DIASTOLIC BLOOD PRESSURE: 64 MMHG | BODY MASS INDEX: 26.2 KG/M2 | HEART RATE: 80 BPM | WEIGHT: 163 LBS | SYSTOLIC BLOOD PRESSURE: 122 MMHG | HEIGHT: 66 IN

## 2025-02-11 DIAGNOSIS — G43.809 OTHER MIGRAINE WITHOUT STATUS MIGRAINOSUS, NOT INTRACTABLE: Primary | ICD-10-CM

## 2025-02-11 DIAGNOSIS — Q24.5 MYOCARDIAL BRIDGE: ICD-10-CM

## 2025-02-11 DIAGNOSIS — R00.2 PALPITATIONS: Primary | ICD-10-CM

## 2025-02-11 DIAGNOSIS — Z95.0 PRESENCE OF CARDIAC PACEMAKER: ICD-10-CM

## 2025-02-11 DIAGNOSIS — Z95.2 S/P AVR (AORTIC VALVE REPLACEMENT): ICD-10-CM

## 2025-02-11 DIAGNOSIS — I71.21 ANEURYSM OF ASCENDING AORTA WITHOUT RUPTURE: ICD-10-CM

## 2025-02-11 PROCEDURE — 93000 ELECTROCARDIOGRAM COMPLETE: CPT | Performed by: NURSE PRACTITIONER

## 2025-02-11 PROCEDURE — 99214 OFFICE O/P EST MOD 30 MIN: CPT | Performed by: NURSE PRACTITIONER

## 2025-02-11 RX ORDER — UBROGEPANT 100 MG/1
TABLET ORAL
Qty: 10 TABLET | Refills: 5 | Status: SHIPPED | OUTPATIENT
Start: 2025-02-11

## 2025-02-11 NOTE — TELEPHONE ENCOUNTER
Requested Prescriptions     Pending Prescriptions Disp Refills    UBRELVY 100 MG TABS [Pharmacy Med Name: Ubrelvy 100 mg tablet] 10 tablet 0     Sig: TAKE ONE TABLET BY MOUTH AS NEEDED. MAX OF TWO TABLETS IN 24 HOURS.       Last Office Visit: 3/6/2024  Next Office Visit: Visit date not found  Last Medication Refill: 1/10/25

## 2025-02-11 NOTE — PROGRESS NOTES
"  Subjective:     Encounter Date:02/11/2025      Patient ID: Kelli Pedro is a 59 y.o. female.    Chief Complaint:\"palpitations and GONZALEZ with exertion\"  Heart Murmur  This is a chronic problem. The current episode started more than 1 month ago. The problem occurs daily. The problem has been waxing and waning. Pertinent negatives include no chest pain, coughing, rash or weakness.   Leg Swelling  This is a chronic problem. The current episode started more than 1 year ago. The problem occurs daily. The problem has been waxing and waning. Pertinent negatives include no chest pain, coughing, rash or weakness.   Heart Problem  Symptoms are chronic.   Onset was 6 to12 months.   Pertinent negative symptoms include no chest pain, no cough, no rash and no weakness.     Patient presents today with complaints of palpitations and GONZALEZ. She is followed by Dr. Bay for ascending aortic aneurysm s/p ascending hemiarch replacement with AVR (Inspiris 25 mm) and myocardial bridge resection 6/18/2024 at [facility] and post procedure complete heart block status post pacemaker. Her last pacemaker interrogation was completed on 2/5 and revealed no arrhythmias.     She presents today and reports over the last 3-4 weeks she has developed exertional palpitations and dyspnea. She notes she had an URI prior to symptom onset and has since completed a course of antibiotics and steroids. 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 She does not have palpitations or dyspnea while at home as she is less active than when at work. She reports her leg swelling is better since she had vein surgery. Of note, her propranolol was stopped on 12/19/24 per her PCP.        The following portions of the patient's history were reviewed and updated as appropriate: allergies, current medications, past family history, past medical " "history, past social history, past surgical history and problem list.    Allergies   Allergen Reactions    Clavulanic Acid Nausea And Vomiting    Gadolinium Other (See Comments)     \"passed out and was shaking all over. Had to spend the night at hospital\"  NAUSEA/POS SYNCOPE  \"passed out and was shaking all over. Had to spend the night at hospital\"  NAUSEA/POS SYNCOPE  \"passed out and was shaking all over. Had to spend the night at hospital\"  \"passed out and was shaking all over. Had to spend the night at hospital\"  NAUSEA/POS SYNCOPE  NAUSEA/POS SYNCOPE      Verapamil Hcl Er Anaphylaxis    Zoster Vac Recomb Adjuvanted Swelling    Flagyl [Metronidazole] Diarrhea     Abdominal pain     Arava [Leflunomide] Rash     Abnormal labs     Estradiol Rash    Hydroxychloroquine GI Intolerance     Abdominal pain     Metformin Unknown - Low Severity    Prednisone Other (See Comments)     Increase in BP    Protonix [Pantoprazole] Nausea Only and Myalgia    Tetracyclines & Related Rash    Trazodone Itching, Other (See Comments) and Unknown (See Comments)     Blurred vision  Blurred vision  Blurred vision  Blurred vision         Current Outpatient Medications:     aspirin 81 MG chewable tablet, Chew 1 tablet Daily., Disp: , Rfl:     atorvastatin (LIPITOR) 40 MG tablet, Take 1 tablet by mouth Daily., Disp: , Rfl:     Continuous Glucose  (FreeStyle Aydin 2 Uehling) device, Use 1 each Continuous., Disp: , Rfl:     Continuous Glucose Sensor (FreeStyle Aydin 2 Sensor) misc, Use 1 each Every 14 (Fourteen) Days., Disp: , Rfl:     eszopiclone (LUNESTA) 3 MG tablet, Take 1 tablet by mouth Every Night. Take immediately before bedtime, Disp: 90 tablet, Rfl: 1    ferrous sulfate 325 (65 FE) MG tablet, Take 1 tablet by mouth 3 (Three) Times a Week., Disp: 36 tablet, Rfl: 1    fluticasone (FLONASE) 50 MCG/ACT nasal spray, 2 sprays into the nostril(s) as directed by provider Daily., Disp: 16 g, Rfl: 1    Galcanezumab-gnlm (Emgality) 120 " MG/ML solution prefilled syringe, Inject 1 mL under the skin into the appropriate area as directed Every 30 (Thirty) Days., Disp: 1 mL, Rfl: 5    hydroCHLOROthiazide 25 MG tablet, Take 1 tablet by mouth Daily., Disp: 30 tablet, Rfl: 1    hydrOXYzine (ATARAX) 25 MG tablet, Take 1 tablet by mouth Every 6 (Six) Hours As Needed for Anxiety., Disp: 120 tablet, Rfl: 1    lansoprazole (PREVACID) 30 MG capsule, Take 1 capsule by mouth Daily., Disp: 90 capsule, Rfl: 3    levothyroxine (Synthroid) 75 MCG tablet, Take 1/2 tablet on Wednesdays. Skip Sunday dose. Take one tablet daily Slfqbi-Idrvusq-Pvtgbiek-Friday-Saturday., Disp: 90 tablet, Rfl: 3    linaclotide (Linzess) 145 MCG capsule capsule, Take 1 capsule by mouth Every Morning Before Breakfast., Disp: 90 capsule, Rfl: 1    magnesium oxide (MAG-OX) 400 MG tablet, Take 1 tablet by mouth Daily., Disp: 90 tablet, Rfl: 1    mycophenolate (CELLCEPT) 500 MG tablet, Take 1 tablet by mouth Daily., Disp: , Rfl:     Narcan 4 MG/0.1ML nasal spray, Administer 1 spray into the nostril(s) as directed by provider As Needed., Disp: , Rfl:     nitroglycerin (NITROSTAT) 0.4 MG SL tablet, Place 1 tablet under the tongue Every 5 (Five) Minutes As Needed for Chest Pain. Take no more than 3 doses in 15 minutes., Disp: 100 tablet, Rfl: 2    ondansetron ODT (ZOFRAN-ODT) 4 MG disintegrating tablet, Place 1 tablet on the tongue Every 8 (Eight) Hours As Needed for Nausea or Vomiting., Disp: 30 tablet, Rfl: 5    PARoxetine (Paxil) 20 MG tablet, Take 1 tablet by mouth Every Morning., Disp: 90 tablet, Rfl: 1    pilocarpine (SALAGEN) 5 MG tablet, Take 1 tablet by mouth 3 (Three) Times a Day., Disp: , Rfl:     potassium chloride (K-DUR,KLOR-CON) 10 MEQ CR tablet, Take 1 tablet by mouth 2 (Two) Times a Day., Disp: , Rfl:     promethazine (PHENERGAN) 25 MG tablet, Take 1 tablet by mouth Every 6 (Six) Hours As Needed for Nausea or Vomiting., Disp: 30 tablet, Rfl: 1    Semaglutide, 1 MG/DOSE, (OZEMPIC) 4  MG/3ML solution pen-injector, Inject 1 mg under the skin into the appropriate area as directed 1 (One) Time Per Week., Disp: 3 mL, Rfl: 0    ubrogepant (UBRELVY) 100 MG tablet, Take 1 tablet at the onset of headache. May repeat dose x1 in 2 hours if headache is not resolved. Max dose 200 mg/24 hours., Disp: 30 tablet, Rfl: 2  Past Medical History:   Diagnosis Date    AAA (abdominal aortic aneurysm)     Abnormal ECG 03/14/2023    Allergic     Anemia     Angina pectoris     Anxiety     Anxiety and depression 06/25/2024    Aortic valve replaced 07/29/2024    Arthritis     Asthma     Bicuspid aortic valve 03/14/2023    Cataract     Surgery 2022    Cholelithiasis     Chronic kidney disease     Clotting disorder     Seeing Leticia Flores at [facility] currently    Colon polyp     Coronary artery disease     Deep vein thrombosis     Dental disease     Diabetes mellitus     Diverticulitis of colon     Diverticulosis     GERD (gastroesophageal reflux disease)     Headache     Heart murmur     Hernia     History of medical problems     Aortic aneurysms    HL (hearing loss)     Hypertension     Hyperthyroidism     Hypothyroid     Irritable bowel syndrome     Kidney stone     Previous haf lithotripsy    Low back pain     Lupus     Mitral valve prolapse     In the [facility] Dr. Claudio Gibson    Mixed hyperlipidemia 11/18/2024    Neuromuscular disorder     NSTEMI (non-ST elevated myocardial infarction) 12/26/2023    Obesity     Osteopenia     Pancreatitis     Peptic ulcer disease     Peptic ulceration     Pneumonia     RBBB 03/14/2023    Scoliosis     Sinusitis     Sjogren's disease     Sleep apnea     Years ago, but no longer have since Gastric Sleeve Surgery    Tinnitus     Ulcerative colitis     Urinary tract infection     Visual impairment        Social History     Socioeconomic History    Marital status:    Tobacco Use    Smoking status: Former     Current packs/day: 0.25     Average packs/day: 0.2 packs/day  for 43.7 years (10.9 ttl pk-yrs)     Types: Cigarettes     Start date: 1/1/1991     Quit date: 1/1/1987     Passive exposure: Past    Smokeless tobacco: Never    Tobacco comments:     [id number] for 6 months only   Vaping Use    Vaping status: Never Used   Substance and Sexual Activity    Alcohol use: Not Currently     Comment: Socially very rare    Drug use: Never    Sexual activity: Yes     Partners: Male     Birth control/protection: None, Partner of same sex     Comment: Thats one male patner i live with       Review of Systems   Constitutional: Negative for malaise/fatigue, weight gain and weight loss.   Cardiovascular:  Positive for dyspnea on exertion, near-syncope and palpitations. Negative for chest pain, irregular heartbeat, leg swelling, orthopnea, paroxysmal nocturnal dyspnea and syncope.   Respiratory:  Negative for cough, shortness of breath, sleep disturbances due to breathing, sputum production and wheezing.    Skin:  Negative for dry skin, flushing, itching and rash.   Gastrointestinal:  Negative for hematemesis and hematochezia.   Neurological:  Positive for dizziness and light-headedness. Negative for loss of balance and weakness.   All other systems reviewed and are negative.         Objective:     Vitals reviewed.   Constitutional:       General: Not in acute distress.     Appearance: Healthy appearance. Well-developed. Not diaphoretic.   Eyes:      General: No scleral icterus.     Conjunctiva/sclera: Conjunctivae normal.      Pupils: Pupils are equal, round, and reactive to light.   HENT:      Head: Normocephalic.    Mouth/Throat:      Pharynx: No oropharyngeal exudate.   Neck:      Vascular: No JVR.   Pulmonary:      Effort: Pulmonary effort is normal. No respiratory distress.      Breath sounds: Normal breath sounds. No wheezing. No rhonchi. No rales.   Chest:      Chest wall: Not tender to palpatation.   Cardiovascular:      Normal rate. Regular rhythm.      Murmurs: There is a grade 2/6  "systolic murmur.   Pulses:     Intact distal pulses.   Edema:     Peripheral edema present.     Ankle: bilateral 1+ non-pitting edema of the ankle.     Feet: bilateral 1+ non-pitting edema of the feet.  Abdominal:      General: Bowel sounds are normal. There is no distension.      Palpations: Abdomen is soft.      Tenderness: There is no abdominal tenderness.   Musculoskeletal: Normal range of motion.      Cervical back: Normal range of motion and neck supple. Skin:     General: Skin is warm and dry.      Coloration: Skin is not pale.      Findings: No erythema or rash.   Neurological:      Mental Status: Alert, oriented to person, place, and time and oriented to person, place and time.      Deep Tendon Reflexes: Reflexes are normal and symmetric.   Psychiatric:         Behavior: Behavior normal.             ECG 12 Lead    Date/Time: 2/11/2025 9:01 AM  Performed by: Sharon Maddox APRN    Authorized by: Sharon Maddox APRN  Comparison: compared with previous ECG from 10/7/2024  Comparison to previous ECG: V pacing has replaced AV pacing  Rhythm: paced  Rate: normal  BPM: 70  Conduction: non-specific intraventricular conduction delay  QRS axis: left  Other findings: T wave abnormality  Pacing: ventricular pacing  Clinical impression: abnormal EKG        /64 (BP Location: Right arm, Patient Position: Sitting, Cuff Size: Adult)   Pulse 80   Ht 167.6 cm (66\")   Wt 73.9 kg (163 lb)   SpO2 96%   BMI 26.31 kg/m²     Lab Review:   I have reviewed previous office notes, device interrogations, recent labs and recent cardiac testing.     2/5/25:      Echo 6/2024:          Assessment:          Diagnosis Plan   1. Palpitations  Holter Monitor - 72 Hour Up To 15 Days      2. S/P AVR (aortic valve replacement)        3. Aneurysm of ascending aorta without rupture        4. Myocardial bridge        5. Presence of cardiac pacemaker                 Plan:       1 Palpitations- normal device interrogation with no " arrhythmias on 2/5. Will place in a 7-day holter for symptom-rhythm correlation. I assume her palpitations are secondary to sinus tachycardia and worse since her Propranolol was stopped in December.   2. S/p AVR- due to bicuspid valve. Postop echo on 6/24/24 noted normal bioprosthetic valve.   3. Aneurysm of ascending aorta- s/p replacement at time of AVR.   4. Myocardial bridging- of mid LAD per cath in 12/2023. No current complaints of chest pain.    5. Pacemaker- normal device function per interrogation on 2/5.       Follow up in 6 weeks or sooner if symptoms worsen. .     I spent 30 minutes caring for Kelli on this date of service. This time includes time spent by me in the following activities:preparing for the visit, reviewing tests, obtaining and/or reviewing a separately obtained history, performing a medically appropriate examination and/or evaluation , counseling and educating the patient/family/caregiver, ordering medications, tests, or procedures, documenting information in the medical record, and care coordination    I spent 2 minutes on the separately reported service of EKG interpretation. This time is not included in the time used to support the E/M service also reported today.

## 2025-02-17 ENCOUNTER — OFFICE VISIT (OUTPATIENT)
Dept: OTOLARYNGOLOGY | Facility: CLINIC | Age: 60
End: 2025-02-17
Payer: COMMERCIAL

## 2025-02-17 VITALS — BODY MASS INDEX: 26.2 KG/M2 | HEIGHT: 66 IN | WEIGHT: 163 LBS

## 2025-02-17 DIAGNOSIS — H61.23 BILATERAL IMPACTED CERUMEN: Primary | ICD-10-CM

## 2025-02-17 PROCEDURE — 69210 REMOVE IMPACTED EAR WAX UNI: CPT | Performed by: NURSE PRACTITIONER

## 2025-02-17 NOTE — PROGRESS NOTES
Ear Cerumen Removal    Date/Time: 2/17/2025 9:54 AM    Performed by: Ellen Soria APRN  Authorized by: Ellen Soria APRN  Consent: Verbal consent obtained.  Consent given by: patient  Patient identity confirmed: verbally with patient    Anesthesia:  Local Anesthetic: none  Location details: left ear and right ear  Patient tolerance: patient tolerated the procedure well with no immediate complications  Procedure type: instrumentation, curette

## 2025-02-21 RX ORDER — NITROGLYCERIN 0.4 MG/1
TABLET SUBLINGUAL
Qty: 50 TABLET | Refills: 2 | OUTPATIENT
Start: 2025-02-21

## 2025-02-21 RX ORDER — NITROGLYCERIN 0.4 MG/1
0.4 TABLET SUBLINGUAL
Qty: 100 TABLET | Refills: 2 | Status: SHIPPED | OUTPATIENT
Start: 2025-02-21

## 2025-02-26 ENCOUNTER — TELEPHONE (OUTPATIENT)
Dept: CARDIOLOGY | Facility: CLINIC | Age: 60
End: 2025-02-26
Payer: COMMERCIAL

## 2025-02-26 NOTE — TELEPHONE ENCOUNTER
PATIENT WAS CALLED AND GIVEN INFORMATION THAT THE RESULT HAS NOT BEEN READ BUT AS SOON AS IT IS, OUR OFFICE WILL CALL HER WITH RESULTS.  Miguelina Monte MA

## 2025-02-28 ENCOUNTER — TELEPHONE (OUTPATIENT)
Dept: CARDIOLOGY | Facility: CLINIC | Age: 60
End: 2025-02-28
Payer: COMMERCIAL

## 2025-02-28 RX ORDER — METOPROLOL SUCCINATE 25 MG/1
25 TABLET, EXTENDED RELEASE ORAL DAILY
Qty: 30 TABLET | Refills: 11 | Status: SHIPPED | OUTPATIENT
Start: 2025-02-28

## 2025-02-28 NOTE — TELEPHONE ENCOUNTER
----- Message from Sharon Maddox sent at 2/28/2025  8:56 AM CST -----  Order placed  ----- Message -----  From: Miguelina Monte MA  Sent: 2/27/2025   3:11 PM CST  To: LAQUITA Keller    PATIENT IS AGREEABLE TO START TOPROL. PLEASE PLACE ORDER.  PATIENT HAS REQUESTED APPOINTMENT WITH DR. COLBY, SO JESSY SPENCE WAS ASKED TO SCHEDULE HER APPOINTMENT WITH HIM.  Miguelina Monte MA  ----- Message -----  From: Sharon Maddox APRN  Sent: 2/27/2025   1:35 PM CST  To: Miguelina Monte MA    During her valve replacement, they removed the muscle that was covering the artery and causing her chest pain therefore her chest pain is likely not cardiac. With her current symptoms, I don't think a stress test will tell us anything and starting a low dose Toprol 25mg daily will help her more than anything.  ----- Message -----  From: Miguelina Monte MA  Sent: 2/27/2025   1:18 PM CST  To: LAQUITA Keller    Patient notified and verbalized understanding.  Miguelina Monte MA  Patient states she thought the next step would be the Stress test you and her talked about at her last visit.  She states Dr. Tidwell took her off of the Propranolol due to hypotension issues.  She is agreeable to a different medication if it will not drop her BP.  Please advise.  ----- Message -----  From: Sharon Maddox APRN  Sent: 2/27/2025  11:28 AM CST  To: Miguelina Monte MA    Please let patient know her holter results were normal and her symptoms occurred at times of a normal heart rate and at times when her heart rate was a little faster than normal.  If she is still having palpitations I would recommend we either restart her propranolol or put her on a different medication to help with palpitations. I think her dyspnea is related to her recent major cardiac surgery.

## 2025-03-09 DIAGNOSIS — F51.04 PSYCHOPHYSIOLOGICAL INSOMNIA: ICD-10-CM

## 2025-03-09 DIAGNOSIS — F06.4 ANXIETY DISORDER DUE TO MEDICAL CONDITION: ICD-10-CM

## 2025-03-10 RX ORDER — POTASSIUM CHLORIDE 750 MG/1
10 TABLET, EXTENDED RELEASE ORAL 2 TIMES DAILY
Qty: 180 TABLET | Refills: 0 | Status: SHIPPED | OUTPATIENT
Start: 2025-03-10

## 2025-03-10 RX ORDER — ESZOPICLONE 3 MG/1
3 TABLET, FILM COATED ORAL NIGHTLY
Qty: 90 TABLET | Refills: 1 | Status: SHIPPED | OUTPATIENT
Start: 2025-03-10

## 2025-03-10 RX ORDER — HYDROCHLOROTHIAZIDE 25 MG/1
25 TABLET ORAL DAILY
Qty: 90 TABLET | Refills: 1 | Status: SHIPPED | OUTPATIENT
Start: 2025-03-10

## 2025-03-10 RX ORDER — PAROXETINE 20 MG/1
20 TABLET, FILM COATED ORAL EVERY MORNING
Qty: 90 TABLET | Refills: 1 | Status: SHIPPED | OUTPATIENT
Start: 2025-03-10

## 2025-03-10 RX ORDER — MYCOPHENOLATE MOFETIL 500 MG/1
500 TABLET ORAL DAILY
Qty: 90 TABLET | Refills: 1 | Status: SHIPPED | OUTPATIENT
Start: 2025-03-10

## 2025-03-12 ENCOUNTER — TELEPHONE (OUTPATIENT)
Dept: CARDIOLOGY | Facility: CLINIC | Age: 60
End: 2025-03-12
Payer: COMMERCIAL

## 2025-03-12 NOTE — TELEPHONE ENCOUNTER
Patient called upset that she continues to have low BP since starting BB. She is not tolerating metoprolol either. She did not take her dose today. BP still low. She continues with palpitations, hx low BP and left sternal pain. She questions a stress test. See encounter 3/11/25

## 2025-03-13 ENCOUNTER — OFFICE VISIT (OUTPATIENT)
Age: 60
End: 2025-03-13

## 2025-03-13 DIAGNOSIS — M25.562 CHRONIC PAIN OF LEFT KNEE: Primary | ICD-10-CM

## 2025-03-13 DIAGNOSIS — G89.29 CHRONIC PAIN OF LEFT KNEE: Primary | ICD-10-CM

## 2025-03-13 DIAGNOSIS — M17.12 PRIMARY OSTEOARTHRITIS OF LEFT KNEE: ICD-10-CM

## 2025-03-13 RX ORDER — BUPIVACAINE HYDROCHLORIDE 5 MG/ML
3 INJECTION, SOLUTION EPIDURAL; INTRACAUDAL ONCE
Status: COMPLETED | OUTPATIENT
Start: 2025-03-13 | End: 2025-03-13

## 2025-03-13 RX ORDER — BETAMETHASONE SODIUM PHOSPHATE AND BETAMETHASONE ACETATE 3; 3 MG/ML; MG/ML
6 INJECTION, SUSPENSION INTRA-ARTICULAR; INTRALESIONAL; INTRAMUSCULAR; SOFT TISSUE ONCE
Status: COMPLETED | OUTPATIENT
Start: 2025-03-13 | End: 2025-03-13

## 2025-03-13 RX ORDER — LIDOCAINE HYDROCHLORIDE 10 MG/ML
1 INJECTION, SOLUTION INFILTRATION; PERINEURAL ONCE
Status: COMPLETED | OUTPATIENT
Start: 2025-03-13 | End: 2025-03-13

## 2025-03-13 RX ADMIN — LIDOCAINE HYDROCHLORIDE 1 ML: 10 INJECTION, SOLUTION INFILTRATION; PERINEURAL at 13:52

## 2025-03-13 RX ADMIN — BUPIVACAINE HYDROCHLORIDE 15 MG: 5 INJECTION, SOLUTION EPIDURAL; INTRACAUDAL at 13:52

## 2025-03-13 RX ADMIN — BETAMETHASONE SODIUM PHOSPHATE AND BETAMETHASONE ACETATE 6 MG: 3; 3 INJECTION, SUSPENSION INTRA-ARTICULAR; INTRALESIONAL; INTRAMUSCULAR; SOFT TISSUE at 13:51

## 2025-03-13 NOTE — PROGRESS NOTES
with the patient to include by are not limited to: incomplete resolution of symptoms, local skin irritation, temporary elevation in blood sugar and blood pressure, tendinopathy, chondrotoxicity.     After sterile prep, understanding the risks, benefits and alternatives, Durolane 60 mg / 3 mL was injected with 22-1/2 gauge needle to the left inferior lateral entry portal of knee. Hemostasis was achieved and a Band-Aid applied. The patient tolerated the procedure well.     Greater than 20 minutes were spent with this encounter. Time spent included evaluating the patient's chart prior to arrival.  Evaluating the patient in the office including history, physical examination, imaging reviewing, and counseling on next steps.  Lastly, time was spent discussing orders with my staff as well as providing documentation in the chart.     Return in about 3 months (around 6/13/2025) for Left knee injection.   Orders Placed This Encounter    DRAIN/INJECT LARGE JOINT/BURSA    XR KNEE LEFT (MIN 4 VIEWS)     Standing Status:   Future     Number of Occurrences:   1     Expected Date:   3/13/2025     Expiration Date:   3/13/2026    betamethasone acetate-betamethasone sodium phosphate (CELESTONE) injection 6 mg    lidocaine 1 % injection 1 mL    BUPivacaine (PF) (MARCAINE) 0.5 % injection 15 mg         Electronically signed by TA Fischer CNP on 3/13/2025 at 9:01 PM.    Dragon Disclaimer:   This note was dictated with voice recognition software.  Though review and corrections are routine, we apologize for any errors.

## 2025-03-14 ENCOUNTER — OFFICE VISIT (OUTPATIENT)
Dept: CARDIOLOGY | Facility: CLINIC | Age: 60
End: 2025-03-14
Payer: COMMERCIAL

## 2025-03-14 VITALS
SYSTOLIC BLOOD PRESSURE: 105 MMHG | HEIGHT: 66 IN | HEART RATE: 85 BPM | BODY MASS INDEX: 26.2 KG/M2 | DIASTOLIC BLOOD PRESSURE: 73 MMHG | WEIGHT: 163 LBS

## 2025-03-14 DIAGNOSIS — R06.09 DOE (DYSPNEA ON EXERTION): ICD-10-CM

## 2025-03-14 DIAGNOSIS — R07.9 CHEST PAIN IN ADULT: ICD-10-CM

## 2025-03-14 DIAGNOSIS — Z95.0 PRESENCE OF CARDIAC PACEMAKER: Primary | ICD-10-CM

## 2025-03-14 DIAGNOSIS — I71.21 ANEURYSM OF ASCENDING AORTA WITHOUT RUPTURE: ICD-10-CM

## 2025-03-14 DIAGNOSIS — I44.2 CHB (COMPLETE HEART BLOCK): ICD-10-CM

## 2025-03-14 DIAGNOSIS — Z95.2 S/P AVR (AORTIC VALVE REPLACEMENT): ICD-10-CM

## 2025-03-14 RX ORDER — MIDODRINE HYDROCHLORIDE 2.5 MG/1
2.5 TABLET ORAL
Qty: 90 TABLET | Refills: 3 | Status: SHIPPED | OUTPATIENT
Start: 2025-03-14

## 2025-03-14 RX ORDER — POTASSIUM CHLORIDE 750 MG/1
1 TABLET, EXTENDED RELEASE ORAL EVERY 12 HOURS SCHEDULED
COMMUNITY
Start: 2025-02-12

## 2025-03-14 RX ORDER — RANOLAZINE 500 MG/1
500 TABLET, EXTENDED RELEASE ORAL 2 TIMES DAILY
Qty: 60 TABLET | Refills: 11 | Status: SHIPPED | OUTPATIENT
Start: 2025-03-14

## 2025-03-14 RX ORDER — SODIUM CHLORIDE 9 MG/ML
40 INJECTION, SOLUTION INTRAVENOUS AS NEEDED
OUTPATIENT
Start: 2025-03-14

## 2025-03-14 RX ORDER — PROMETHAZINE HYDROCHLORIDE 25 MG/1
25 TABLET ORAL EVERY 6 HOURS PRN
Qty: 30 TABLET | Refills: 1 | Status: SHIPPED | OUTPATIENT
Start: 2025-03-14

## 2025-03-14 RX ORDER — SODIUM CHLORIDE 0.9 % (FLUSH) 0.9 %
10 SYRINGE (ML) INJECTION EVERY 12 HOURS SCHEDULED
OUTPATIENT
Start: 2025-03-14

## 2025-03-14 RX ORDER — SODIUM CHLORIDE 0.9 % (FLUSH) 0.9 %
10 SYRINGE (ML) INJECTION AS NEEDED
OUTPATIENT
Start: 2025-03-14

## 2025-03-14 NOTE — PROGRESS NOTES
Kelli Pedro  [id number]  1965  59 y.o.  female    Referring Provider: ELLEN Tidwell DO    Reason for  Visit: Here for routine follow up     Subjective    Mild chronic exertional shortness of breath on exertion relieved with rest  No significant cough or wheezing  No palpitations  Associated chest pain especially with exertion  But can wake up with chest pain   Chest pain non pleuritic  Chest pain non positional and non gustatory   Easy fatiguability and increasing tired  Feels energy levels running low    No significant pedal edema  No fever or chills  No significant expectoration  No hemoptysis  No presyncope or syncope  Tolerating current medications well with no untoward side effects   Compliant with prescribed medication regimen. Tries to adhere to cardiac diet.   Prior AVR and myocardial bridging s/p release at [facility]   S/L NTG helps subside the chest pain within 5 mins of taking   Gets dizzy spells     History of present illness:  Kelli Pedro is a 59 y.o. yo female with Myocardial bridging s/p surgery and AVR, s/p pacer [facility] who presents today for   Chief Complaint   Patient presents with    Palpitations     2 mo     Dizziness   .    History  Past Medical History:   Diagnosis Date    AAA (abdominal aortic aneurysm)     Abnormal ECG 03/14/2023    Allergic     Anemia     Angina pectoris     Anxiety     Anxiety and depression 06/25/2024    Aortic valve replaced 07/29/2024    Arthritis     Asthma     Bicuspid aortic valve 03/14/2023    Cataract     Surgery 2022    Cholelithiasis     Chronic kidney disease     Clotting disorder     Seeing Leticia Flores at [facility] currently    Colon polyp     Coronary artery disease     Deep vein thrombosis     Dental disease     Diabetes mellitus     Diverticulitis of colon     Diverticulosis     GERD (gastroesophageal reflux disease)     Headache     Heart murmur     Hernia     History of medical problems     Aortic aneurysms    HL (hearing loss)      Hypertension     Hyperthyroidism     Hypothyroid     Irritable bowel syndrome     Kidney stone     Previous haf lithotripsy    Low back pain     Lupus     Mitral valve prolapse     In the [facility] MyChart Dr. Claudio Gibson    Mixed hyperlipidemia 11/18/2024    Neuromuscular disorder     NSTEMI (non-ST elevated myocardial infarction) 12/26/2023    Obesity     Osteopenia     Pancreatitis     Peptic ulcer disease     Peptic ulceration     Pneumonia     RBBB 03/14/2023    Scoliosis     Sinusitis     Sjogren's disease     Sleep apnea     Years ago, but no longer have since Gastric Sleeve Surgery    Tinnitus     Ulcerative colitis     Urinary tract infection     Visual impairment    ,   Past Surgical History:   Procedure Laterality Date    ABDOMINAL AORTIC ANEURYSM REPAIR  06/18/2024    ABDOMINAL SURGERY      ANKLE SURGERY      ASCENDING ARCH/HEMIARCH REPLACEMENT  06/18/2024    BACK SURGERY      BARIATRIC SURGERY      CARDIAC CATHETERIZATION N/A 12/26/2023    Procedure: Left Heart Cath;  Surgeon: Pablito De Leon DO;  Location:  [facility] CATH INVASIVE LOCATION;  Service: Cardiovascular;  Laterality: N/A;    CARDIAC CATHETERIZATION  12/26/2023    CARDIAC SURGERY  06/2024    CARDIAC VALVE REPLACEMENT  06/2024    CARPAL TUNNEL RELEASE Bilateral     CHOLECYSTECTOMY      COLONOSCOPY  04/24/2023    normal colon consistent with ibs    ENDOSCOPY  04/24/2023    large hitial hernia,chronic gastritis    EYE SURGERY  2022    Cataracts both eyes    FLEXIBLE SIGMOIDOSCOPY      FOOT SURGERY      GALLBLADDER SURGERY      HYSTERECTOMY      PACEMAKER IMPLANTATION  06/2024    SHOULDER SURGERY      SUBTOTAL HYSTERECTOMY      TUBAL ABDOMINAL LIGATION      UPPER GASTROINTESTINAL ENDOSCOPY      VEIN SURGERY      [facility]   ,   Family History   Problem Relation Age of Onset    Breast cancer Mother 70    Diabetes Mother     Hearing loss Mother     Heart disease Mother     Hyperlipidemia Mother     Vision loss  Mother     Clotting disorder Mother     Fainting Mother     Hypertension Mother     Cancer Mother     Migraines Mother     Inflammatory bowel disease Mother     Scleroderma Father     Diabetes Father     Hearing loss Father     Heart disease Father     Hyperlipidemia Father     Vision loss Father     Clotting disorder Father     Fainting Father     Heart attack Father     Hypertension Father     Cancer Father     Stroke Father     Heart valve disorder Father     Colon polyps Father     Inflammatory bowel disease Father     Ulcerative colitis Father     Diabetes Sister     Hyperlipidemia Sister     Vision loss Sister     Asthma Sister     Hypertension Sister     Thyroid disease Sister     Hyperlipidemia Brother     Vision loss Brother     Asthma Brother     Hypertension Brother     Diabetes Brother     Early death Brother     Colon cancer Neg Hx    ,   Social History     Tobacco Use    Smoking status: Former     Current packs/day: 0.25     Average packs/day: 0.3 packs/day for 43.8 years (10.9 ttl pk-yrs)     Types: Cigarettes     Start date: 1/1/1991     Quit date: 1/1/1987     Passive exposure: Past    Smokeless tobacco: Never    Tobacco comments:     [id number] for 6 months only   Vaping Use    Vaping status: Never Used   Substance Use Topics    Alcohol use: Not Currently     Comment: Socially very rare    Drug use: Never   ,     Medications  Current Outpatient Medications   Medication Sig Dispense Refill    aspirin 81 MG chewable tablet Chew 1 tablet Daily.      atorvastatin (LIPITOR) 40 MG tablet Take 1 tablet by mouth Daily.      Continuous Glucose  (FreeStyle Aydin 2 [place]) device Use 1 each Continuous.      Continuous Glucose Sensor (FreeStyle Aydin 2 Sensor) misc Use 1 each Every 14 (Fourteen) Days.      eszopiclone (LUNESTA) 3 MG tablet Take 1 tablet by mouth Every Night. Take immediately before bedtime 90 tablet 1    ferrous sulfate 325 (65 FE) MG tablet Take 1 tablet by mouth 3 (Three) Times a  Week. 36 tablet 1    fluticasone (FLONASE) 50 MCG/ACT nasal spray 2 sprays into the nostril(s) as directed by provider Daily. 16 g 1    Galcanezumab-gnlm (Emgality) 120 MG/ML solution prefilled syringe Inject 1 mL under the skin into the appropriate area as directed Every 30 (Thirty) Days. 1 mL 5    hydroCHLOROthiazide 25 MG tablet Take 1 tablet by mouth Daily. 90 tablet 1    hydrOXYzine (ATARAX) 25 MG tablet Take 1 tablet by mouth Every 6 (Six) Hours As Needed for Anxiety. 120 tablet 1    lansoprazole (PREVACID) 30 MG capsule Take 1 capsule by mouth Daily. 90 capsule 3    levothyroxine (Synthroid) 75 MCG tablet Take 1/2 tablet on Wednesdays. Skip Sunday dose. Take one tablet daily Udlvbd-Pmrswic-Szjsqntu-Friday-Saturday. 90 tablet 3    linaclotide (Linzess) 145 MCG capsule capsule Take 1 capsule by mouth Every Morning Before Breakfast. 90 capsule 1    magnesium oxide (MAG-OX) 400 MG tablet Take 1 tablet by mouth Daily. 90 tablet 1    mycophenolate (CELLCEPT) 500 MG tablet Take 1 tablet by mouth Daily. 90 tablet 1    Narcan 4 MG/0.1ML nasal spray Administer 1 spray into the nostril(s) as directed by provider As Needed.      nitroglycerin (NITROSTAT) 0.4 MG SL tablet Place 1 tablet under the tongue Every 5 (Five) Minutes As Needed for Chest Pain. Take no more than 3 doses in 15 minutes. 100 tablet 2    ondansetron ODT (ZOFRAN-ODT) 4 MG disintegrating tablet Place 1 tablet on the tongue Every 8 (Eight) Hours As Needed for Nausea or Vomiting. 30 tablet 5    PARoxetine (Paxil) 20 MG tablet Take 1 tablet by mouth Every Morning. 90 tablet 1    pilocarpine (SALAGEN) 5 MG tablet Take 1 tablet by mouth 3 (Three) Times a Day.      potassium chloride (KLOR-CON M10) 10 MEQ CR tablet Take 1 tablet by mouth 2 (Two) Times a Day. 180 tablet 0    potassium chloride 10 MEQ CR tablet Take 1 tablet by mouth Every 12 (Twelve) Hours.      promethazine (PHENERGAN) 25 MG tablet TAKE ONE TABLET BY MOUTH EVERY 6 HOURS AS NEEDED FOR NAUSEA  "AND VOMITING 30 tablet 1    Semaglutide, 1 MG/DOSE, (OZEMPIC) 4 MG/3ML solution pen-injector Inject 1 mg under the skin into the appropriate area as directed 1 (One) Time Per Week. 3 mL 0    ubrogepant (UBRELVY) 100 MG tablet Take 1 tablet at the onset of headache. May repeat dose x1 in 2 hours if headache is not resolved. Max dose 200 mg/24 hours. 30 tablet 2    metoprolol succinate XL (TOPROL-XL) 25 MG 24 hr tablet Take 1 tablet by mouth Daily. (Patient not taking: Reported on 3/14/2025) 30 tablet 11    midodrine (PROAMATINE) 2.5 MG tablet Take 1 tablet by mouth 3 (Three) Times a Day Before Meals. 90 tablet 3    ranolazine (Ranexa) 500 MG 12 hr tablet Take 1 tablet by mouth 2 (Two) Times a Day. 60 tablet 11     No current facility-administered medications for this visit.       Allergies:  Clavulanic acid, Gadolinium, Verapamil hcl er, Zoster vac recomb adjuvanted, Flagyl [metronidazole], Arava [leflunomide], Estradiol, Hydroxychloroquine, Metformin, Prednisone, Protonix [pantoprazole], Tetracyclines & related, and Trazodone    Review of Systems  Review of Systems   Constitutional: Negative.   HENT: Negative.     Eyes: Negative.    Cardiovascular:  Positive for chest pain, dyspnea on exertion and palpitations. Negative for claudication, cyanosis, irregular heartbeat, leg swelling, near-syncope, orthopnea, paroxysmal nocturnal dyspnea and syncope.   Respiratory: Negative.     Endocrine: Negative.    Hematologic/Lymphatic: Negative.    Skin: Negative.    Gastrointestinal:  Negative for anorexia.   Genitourinary: Negative.    Neurological: Negative.    Psychiatric/Behavioral: Negative.         Objective     Physical Exam:  /73   Pulse 85   Ht 167.6 cm (66\")   Wt 73.9 kg (163 lb)   BMI 26.31 kg/m²     Physical Exam  Constitutional:       Appearance: Normal appearance.   HENT:      Head: Normocephalic.   Eyes:      General: Lids are normal.   Neck:      Vascular: No carotid bruit.   Cardiovascular:      Rate " and Rhythm: Regular rhythm.      Heart sounds: Normal heart sounds, S1 normal and S2 normal.      No systolic murmur is present with a grade of 2/6.   Pulmonary:      Effort: Pulmonary effort is normal.   Abdominal:      Palpations: Abdomen is soft.   Neurological:      Mental Status: She is alert.   Psychiatric:         Speech: Speech normal.         Behavior: Behavior normal.         Thought Content: Thought content normal.         Results Review:    Narrative & Impression   EXAMINATION: CT ANGIOGRAM CHEST-      10/7/2024 10:36 AM     HISTORY: Right-sided chest pain radiating to back.  Please comment on  aorta as well.  History of DVT     In order to have a CT radiation dose as low as reasonably achievable  Automated Exposure Control was utilized for adjustment of the mA and/or  KV according to patient size.     Total DLP = 391.33 mGy.cm     CT angiography of the chest tube performed after intravenous contrast  enhancement.     Images are acquired in axial plane is subsequent reconstruction in  coronal and sagittal planes.     The comparison is made with the previous study dated 4/1/2024.     An aortic valve prosthesis is in place. This was not noted in the  previous study.     Atheromatous change of the thoracic aorta are seen. No aneurysmal  dilatation or dissection. The maximum lumen of the ascending thoracic  aorta measures 3.1 cm. It measured 4.4 cm at the same level in the  previous study. Sinus of Valsalva measures 3.7 cm in the coronal plane  images.     Normal opacification of the pulmonary arteries and branches bilaterally.  No filling defects in the visualized/opacified pulmonary arterial bed.     Atheromatous changes of the coronary arteries are seen. There is  evidence of prior CABG.     There is no evidence of mediastinal or hilar mass or lymphadenopathy.     There is normal origin course and caliber of the brachiocephalic, left  common carotid and left subclavian arteries.     Limited visualized  soft tissue of the neck are unremarkable.  Incompletely visualized thyroid gland appears unremarkable. No nodules.     The lungs are moderately well-expanded.     No areas of focal consolidation or acute infiltrate.     No lung nodules.     Central airway is patent and clear. No endobronchial lesions.     There is a small sliding-type hiatal hernia. There are postsurgical  changes of the stomach.     Limited visualized abdomen appears unremarkable. Images reviewed in bone  window show no acute bony abnormality. There is mild dextroscoliosis of  thoracic spine. A cardiac pacer is seen in place.     IMPRESSION:  1. No acute abnormality of the chest to account for patient's symptoms.  2. Atheromatous disease of the thoracic aorta. No evidence of aneurysmal  dilatation. The aortic lumen is normal and the previously seen  dilatation of the ascending aorta is not visualized in this study. There  is interval placement of aortic valvular prosthesis.  3. The lungs are unremarkable.          Results for orders placed during the hospital encounter of 11/02/23    Adult Transthoracic Echo Complete w/ Color, Spectral and Contrast if necessary per protocol    Interpretation Summary    Left ventricular systolic function is normal. Calculated left ventricular EF = 64.4% Left ventricular ejection fraction appears to be 61 - 65%.    Left ventricular diastolic function was normal.    Mild aortic valve stenosis is present.  Calcified bicuspid aortic valve noted    Estimated right ventricular systolic pressure from tricuspid regurgitation is normal (<35 mmHg).    Thoracic aortic aneurysm that measures 4.6 cm in diameter    Normal global longitudinal LV strain (GLS) = -18.2%.        ____________________________________________________________________________________________________________________________________________  Health maintenance and recommendations    Low salt/ HTN/ Heart healthy carbohydrate restricted cardiac diet   The  patient is advised to reduce or avoid caffeine or other cardiac stimulants.   Minimize or avoid  NSAID-type medications      Monitor for any signs of bleeding including red or dark stools. Fall precautions.   Advised staying uptodate with immunizations per established standard guidelines.    Offered to give patient  a copy of my notes     Questions were encouraged, asked and answered to the patient's  understanding and satisfaction. Questions if any regarding current medications and side effects, need for refills and importance of compliance to medications stressed.    Reviewed available prior notes, consults, prior visits, laboratory findings, radiology and cardiology relevant reports. Updated chart as applicable. I have reviewed the patient's medical history in detail and updated the computerized patient record as relevant.      Updated patient regarding any new or relevant abnormalities on review of records or any new findings on physical exam. Mentioned to patient about purpose of visit and desirable health short and long term goals and objectives.    Primary to monitor CBC CMP Lipid panel and TSH as applicable    ___________________________________________________________________________________________________________________________________________   Procedures    Assessment & Plan   Diagnoses and all orders for this visit:    1. Presence of cardiac pacemaker (Primary)  -     Ambulatory Referral to Cardiac Electrophysiology    2. CHB (complete heart block)  -     Ambulatory Referral to Cardiac Electrophysiology    3. Aneurysm of ascending aorta without rupture    4. S/P AVR (aortic valve replacement)    5. GONZALEZ (dyspnea on exertion)    6. Chest pain in adult  -     Stress Test With PET Myocardial Perfusion; Future  -     NM PET Cardiac Stress Radiology Interpretation; Future  -     No Caffeine, Decaffeinated Coffee, or Nicotine 12 hrs Prior to Cardiac PET CT Appointment  -     Nothing to Eat or Drink 6 Hours  Prior to Cardiac PET CT Appointment  -     No Theophylline or Products Containing Theophylline for 12 Hours prior Cardiac PET CT Appointment  -     No Oral Diabetic Medications After Midnight Prior to Cardiac PET CT Appointment, Hold Half of Insulin Dose Morning of Cardiac PET CT Appointment  -     Insert Peripheral IV; Standing  -     Saline Lock & Maintain IV Access; Standing  -     sodium chloride 0.9 % flush 10 mL  -     sodium chloride 0.9 % flush 10 mL  -     sodium chloride 0.9 % infusion 40 mL    Other orders  -     ranolazine (Ranexa) 500 MG 12 hr tablet; Take 1 tablet by mouth 2 (Two) Times a Day.  Dispense: 60 tablet; Refill: 11  -     midodrine (PROAMATINE) 2.5 MG tablet; Take 1 tablet by mouth 3 (Three) Times a Day Before Meals.  Dispense: 90 tablet; Refill: 3          Plan    OK stop beta blocker therapy     Orders Placed This Encounter   Procedures    NM PET Cardiac Stress Radiology Interpretation     Standing Status:   Future     Expected Date:   3/17/2025     Expiration Date:   6/14/2026     Reason for Exam::   cp     Release to patient:   Routine Release [[id number]]    Ambulatory Referral to Cardiac Electrophysiology     Referral Priority:   Routine     Referral Type:   Consultation     Referral Reason:   Specialty Services Required     Requested Specialty:   Cardiac Electrophysiology     Number of Visits Requested:   1    No Caffeine, Decaffeinated Coffee, or Nicotine 12 hrs Prior to Cardiac PET CT Appointment    Nothing to Eat or Drink 6 Hours Prior to Cardiac PET CT Appointment    No Theophylline or Products Containing Theophylline for 12 Hours prior Cardiac PET CT Appointment    No Oral Diabetic Medications After Midnight Prior to Cardiac PET CT Appointment, Hold Half of Insulin Dose Morning of Cardiac PET CT Appointment    Stress Test With PET Myocardial Perfusion     Standing Status:   Future     Expected Date:   3/19/2025     Expiration Date:   6/14/2026     Reason for exam?:   Chest Pain      Release to patient:   Routine Release [[id number]]      Requested Prescriptions     Signed Prescriptions Disp Refills    ranolazine (Ranexa) 500 MG 12 hr tablet 60 tablet 11     Sig: Take 1 tablet by mouth 2 (Two) Times a Day.    midodrine (PROAMATINE) 2.5 MG tablet 90 tablet 3     Sig: Take 1 tablet by mouth 3 (Three) Times a Day Before Meals.      Start low dose Midodrine and see if BP improves    Monitor for any signs of bleeding including red or dark stools as well as easy bruisabilty. Fall precautions.             Return in about 4 weeks (around 4/11/2025).

## 2025-03-17 ENCOUNTER — HOSPITAL ENCOUNTER (OUTPATIENT)
Dept: CARDIOLOGY | Facility: HOSPITAL | Age: 60
Discharge: HOME | End: 2025-03-17
Admitting: INTERNAL MEDICINE
Payer: COMMERCIAL

## 2025-03-17 VITALS
HEIGHT: 66 IN | BODY MASS INDEX: 26.2 KG/M2 | SYSTOLIC BLOOD PRESSURE: 105 MMHG | WEIGHT: 163 LBS | DIASTOLIC BLOOD PRESSURE: 73 MMHG

## 2025-03-17 DIAGNOSIS — R07.9 CHEST PAIN IN ADULT: ICD-10-CM

## 2025-03-17 DIAGNOSIS — R06.09 DOE (DYSPNEA ON EXERTION): ICD-10-CM

## 2025-03-17 PROCEDURE — 93356 MYOCRD STRAIN IMG SPCKL TRCK: CPT

## 2025-03-17 PROCEDURE — 93306 TTE W/DOPPLER COMPLETE: CPT

## 2025-03-18 DIAGNOSIS — G43.701 CHRONIC MIGRAINE WITHOUT AURA WITH STATUS MIGRAINOSUS, NOT INTRACTABLE: ICD-10-CM

## 2025-03-19 DIAGNOSIS — F06.4 ANXIETY DISORDER DUE TO MEDICAL CONDITION: ICD-10-CM

## 2025-03-19 RX ORDER — PAROXETINE 40 MG/1
40 TABLET, FILM COATED ORAL EVERY MORNING
Qty: 90 TABLET | Refills: 1 | Status: SHIPPED | OUTPATIENT
Start: 2025-03-19

## 2025-03-21 ENCOUNTER — HOSPITAL ENCOUNTER (OUTPATIENT)
Facility: HOSPITAL | Age: 60
Discharge: HOME | End: 2025-03-21
Payer: COMMERCIAL

## 2025-03-21 ENCOUNTER — LAB (OUTPATIENT)
Dept: LAB | Facility: HOSPITAL | Age: 60
End: 2025-03-21
Payer: COMMERCIAL

## 2025-03-21 ENCOUNTER — TRANSCRIBE ORDERS (OUTPATIENT)
Dept: ADMINISTRATIVE | Facility: HOSPITAL | Age: 60
End: 2025-03-21
Payer: COMMERCIAL

## 2025-03-21 ENCOUNTER — PATIENT MESSAGE (OUTPATIENT)
Dept: CARDIOLOGY | Facility: CLINIC | Age: 60
End: 2025-03-21
Payer: COMMERCIAL

## 2025-03-21 VITALS
HEART RATE: 60 BPM | DIASTOLIC BLOOD PRESSURE: 68 MMHG | SYSTOLIC BLOOD PRESSURE: 134 MMHG | HEIGHT: 66 IN | BODY MASS INDEX: 26.03 KG/M2 | WEIGHT: 162 LBS

## 2025-03-21 DIAGNOSIS — Z79.899 HIGH RISK MEDICATION USE: ICD-10-CM

## 2025-03-21 DIAGNOSIS — Z79.899 HIGH RISK MEDICATION USE: Primary | ICD-10-CM

## 2025-03-21 DIAGNOSIS — G05.3 LUPUS ENCEPHALITIS: ICD-10-CM

## 2025-03-21 DIAGNOSIS — M32.19 LUPUS ENCEPHALITIS: ICD-10-CM

## 2025-03-21 DIAGNOSIS — R07.9 CHEST PAIN IN ADULT: ICD-10-CM

## 2025-03-21 LAB
ALBUMIN SERPL-MCNC: 4.2 G/DL (ref 3.5–5.2)
ALBUMIN/GLOB SERPL: 1.5 G/DL
ALP SERPL-CCNC: 144 U/L (ref 39–117)
ALT SERPL W P-5'-P-CCNC: 38 U/L (ref 1–33)
ANION GAP SERPL CALCULATED.3IONS-SCNC: 10 MMOL/L (ref 5–15)
AST SERPL-CCNC: 36 U/L (ref 1–32)
BASOPHILS # BLD AUTO: 0.04 10*3/MM3 (ref 0–0.2)
BASOPHILS NFR BLD AUTO: 0.7 % (ref 0–1.5)
BH CV NUCLEAR PRIOR STUDY: 3
BH CV REST NUCLEAR ISOTOPE DOSE: 19.1 MCI
BH CV STRESS BP STAGE 1: NORMAL
BH CV STRESS COMMENTS STAGE 1: NORMAL
BH CV STRESS DOSE REGADENOSON STAGE 1: 0.4
BH CV STRESS DURATION MIN STAGE 1: 0
BH CV STRESS DURATION SEC STAGE 1: 10
BH CV STRESS HR STAGE 1: 77
BH CV STRESS NUCLEAR ISOTOPE DOSE: 19.1 MCI
BH CV STRESS PROTOCOL 1: NORMAL
BH CV STRESS RECOVERY BP: NORMAL MMHG
BH CV STRESS RECOVERY HR: 66 BPM
BH CV STRESS STAGE 1: 1
BILIRUB SERPL-MCNC: 0.9 MG/DL (ref 0–1.2)
BUN SERPL-MCNC: 12 MG/DL (ref 6–20)
BUN/CREAT SERPL: 13.8 (ref 7–25)
C3 SERPL-MCNC: 164 MG/DL (ref 82–167)
C4 SERPL-MCNC: 27 MG/DL (ref 14–44)
CALCIUM SPEC-SCNC: 9.2 MG/DL (ref 8.6–10.5)
CHLORIDE SERPL-SCNC: 96 MMOL/L (ref 98–107)
CO2 SERPL-SCNC: 30 MMOL/L (ref 22–29)
CREAT SERPL-MCNC: 0.87 MG/DL (ref 0.57–1)
DEPRECATED RDW RBC AUTO: 42.5 FL (ref 37–54)
EGFRCR SERPLBLD CKD-EPI 2021: 76.9 ML/MIN/1.73
EOSINOPHIL # BLD AUTO: 0.13 10*3/MM3 (ref 0–0.4)
EOSINOPHIL NFR BLD AUTO: 2.4 % (ref 0.3–6.2)
ERYTHROCYTE [DISTWIDTH] IN BLOOD BY AUTOMATED COUNT: 13 % (ref 12.3–15.4)
ERYTHROCYTE [SEDIMENTATION RATE] IN BLOOD: 9 MM/HR (ref 0–30)
GLOBULIN UR ELPH-MCNC: 2.8 GM/DL
GLUCOSE SERPL-MCNC: 94 MG/DL (ref 65–99)
HCT VFR BLD AUTO: 42.5 % (ref 34–46.6)
HGB BLD-MCNC: 14.2 G/DL (ref 12–15.9)
IMM GRANULOCYTES # BLD AUTO: 0.01 10*3/MM3 (ref 0–0.05)
IMM GRANULOCYTES NFR BLD AUTO: 0.2 % (ref 0–0.5)
LYMPHOCYTES # BLD AUTO: 1.64 10*3/MM3 (ref 0.7–3.1)
LYMPHOCYTES NFR BLD AUTO: 29.7 % (ref 19.6–45.3)
MAXIMAL PREDICTED HEART RATE: 161 BPM
MCH RBC QN AUTO: 29.7 PG (ref 26.6–33)
MCHC RBC AUTO-ENTMCNC: 33.4 G/DL (ref 31.5–35.7)
MCV RBC AUTO: 88.9 FL (ref 79–97)
MONOCYTES # BLD AUTO: 0.45 10*3/MM3 (ref 0.1–0.9)
MONOCYTES NFR BLD AUTO: 8.1 % (ref 5–12)
NEUTROPHILS NFR BLD AUTO: 3.26 10*3/MM3 (ref 1.7–7)
NEUTROPHILS NFR BLD AUTO: 58.9 % (ref 42.7–76)
NRBC BLD AUTO-RTO: 0 /100 WBC (ref 0–0.2)
PERCENT MAX PREDICTED HR: 47.83 %
PLATELET # BLD AUTO: 227 10*3/MM3 (ref 140–450)
PMV BLD AUTO: 10.6 FL (ref 6–12)
POTASSIUM SERPL-SCNC: 3.6 MMOL/L (ref 3.5–5.2)
PROT SERPL-MCNC: 7 G/DL (ref 6–8.5)
RBC # BLD AUTO: 4.78 10*6/MM3 (ref 3.77–5.28)
SODIUM SERPL-SCNC: 136 MMOL/L (ref 136–145)
SPECT HRT GATED+EF W RNC IV: 69 %
STRESS BASELINE BP: NORMAL MMHG
STRESS BASELINE HR: 60 BPM
STRESS PERCENT HR: 56 %
STRESS POST EXERCISE DUR MIN: 0 MIN
STRESS POST EXERCISE DUR SEC: 10 SEC
STRESS POST PEAK BP: 1977 MMHG
STRESS POST PEAK HR: 77 BPM
STRESS TARGET HR: 137 BPM
WBC NRBC COR # BLD AUTO: 5.53 10*3/MM3 (ref 3.4–10.8)

## 2025-03-21 PROCEDURE — 78430 MYOCRD IMG PET RST/STRS W/CT: CPT

## 2025-03-21 PROCEDURE — 86160 COMPLEMENT ANTIGEN: CPT

## 2025-03-21 PROCEDURE — 34310000005 RUBIDIUM CHLORIDE: Performed by: INTERNAL MEDICINE

## 2025-03-21 PROCEDURE — 36415 COLL VENOUS BLD VENIPUNCTURE: CPT

## 2025-03-21 PROCEDURE — 80053 COMPREHEN METABOLIC PANEL: CPT

## 2025-03-21 PROCEDURE — A9555 RB82 RUBIDIUM: HCPCS | Performed by: INTERNAL MEDICINE

## 2025-03-21 PROCEDURE — 85652 RBC SED RATE AUTOMATED: CPT

## 2025-03-21 PROCEDURE — 25010000002 REGADENOSON 0.4 MG/5ML SOLUTION: Performed by: INTERNAL MEDICINE

## 2025-03-21 PROCEDURE — 93017 CV STRESS TEST TRACING ONLY: CPT

## 2025-03-21 PROCEDURE — 85025 COMPLETE CBC W/AUTO DIFF WBC: CPT | Performed by: INTERNAL MEDICINE

## 2025-03-21 RX ORDER — REGADENOSON 0.08 MG/ML
0.4 INJECTION, SOLUTION INTRAVENOUS ONCE
Status: COMPLETED | OUTPATIENT
Start: 2025-03-21 | End: 2025-03-21

## 2025-03-21 RX ADMIN — REGADENOSON 0.4 MG: 0.08 INJECTION, SOLUTION INTRAVENOUS at 09:32

## 2025-03-24 LAB
AORTIC DIMENSIONLESS INDEX: 0.53 (DI)
AV MEAN PRESS GRAD SYS DOP V1V2: 6 MMHG
AV VMAX SYS DOP: 168 CM/SEC
BH CV ECHO LEFT VENTRICLE GLOBAL LONGITUDINAL STRAIN: -16.2 %
BH CV ECHO MEAS - AO MAX PG: 11.3 MMHG
BH CV ECHO MEAS - AO ROOT DIAM: 3.8 CM
BH CV ECHO MEAS - AO V2 VTI: 36.4 CM
BH CV ECHO MEAS - AVA(I,D): 1.84 CM2
BH CV ECHO MEAS - EDV(CUBED): 41.8 ML
BH CV ECHO MEAS - EDV(MOD-SP2): 52.3 ML
BH CV ECHO MEAS - EDV(MOD-SP4): 82.1 ML
BH CV ECHO MEAS - EF(MOD-SP2): 66.5 %
BH CV ECHO MEAS - EF(MOD-SP4): 67.5 %
BH CV ECHO MEAS - ESV(CUBED): 11.2 ML
BH CV ECHO MEAS - ESV(MOD-SP2): 17.5 ML
BH CV ECHO MEAS - ESV(MOD-SP4): 26.7 ML
BH CV ECHO MEAS - FS: 35.4 %
BH CV ECHO MEAS - IVS/LVPW: 1.29 CM
BH CV ECHO MEAS - IVSD: 1.2 CM
BH CV ECHO MEAS - LA DIMENSION: 3.2 CM
BH CV ECHO MEAS - LAT PEAK E' VEL: 12.6 CM/SEC
BH CV ECHO MEAS - LV DIASTOLIC VOL/BSA (35-75): 44.7 CM2
BH CV ECHO MEAS - LV MASS(C)D: 111.8 GRAMS
BH CV ECHO MEAS - LV MAX PG: 3.6 MMHG
BH CV ECHO MEAS - LV MEAN PG: 2 MMHG
BH CV ECHO MEAS - LV SYSTOLIC VOL/BSA (12-30): 14.5 CM2
BH CV ECHO MEAS - LV V1 MAX: 94.8 CM/SEC
BH CV ECHO MEAS - LV V1 VTI: 19.3 CM
BH CV ECHO MEAS - LVIDD: 3.5 CM
BH CV ECHO MEAS - LVIDS: 2.24 CM
BH CV ECHO MEAS - LVOT AREA: 3.5 CM2
BH CV ECHO MEAS - LVOT DIAM: 2.1 CM
BH CV ECHO MEAS - LVPWD: 0.93 CM
BH CV ECHO MEAS - MED PEAK E' VEL: 9.5 CM/SEC
BH CV ECHO MEAS - MV A MAX VEL: 93.7 CM/SEC
BH CV ECHO MEAS - MV DEC TIME: 0.28 SEC
BH CV ECHO MEAS - MV E MAX VEL: 85.3 CM/SEC
BH CV ECHO MEAS - MV E/A: 0.91
BH CV ECHO MEAS - MV MAX PG: 4 MMHG
BH CV ECHO MEAS - MV MEAN PG: 2 MMHG
BH CV ECHO MEAS - MV V2 VTI: 26.1 CM
BH CV ECHO MEAS - MVA(VTI): 2.6 CM2
BH CV ECHO MEAS - PA V2 MAX: 74.8 CM/SEC
BH CV ECHO MEAS - RAP SYSTOLE: 3 MMHG
BH CV ECHO MEAS - RV MAX PG: 1.16 MMHG
BH CV ECHO MEAS - RV V1 MAX: 53.8 CM/SEC
BH CV ECHO MEAS - RV V1 VTI: 11.7 CM
BH CV ECHO MEAS - RVDD: 2.9 CM
BH CV ECHO MEAS - RVSP: 15.4 MMHG
BH CV ECHO MEAS - SV(LVOT): 66.8 ML
BH CV ECHO MEAS - SV(MOD-SP2): 34.8 ML
BH CV ECHO MEAS - SV(MOD-SP4): 55.4 ML
BH CV ECHO MEAS - SVI(LVOT): 36.4 ML/M2
BH CV ECHO MEAS - SVI(MOD-SP2): 18.9 ML/M2
BH CV ECHO MEAS - SVI(MOD-SP4): 30.2 ML/M2
BH CV ECHO MEAS - TAPSE (>1.6): 1.3 CM
BH CV ECHO MEAS - TR MAX PG: 12.4 MMHG
BH CV ECHO MEAS - TR MAX VEL: 176 CM/SEC
BH CV ECHO MEASUREMENTS AVERAGE E/E' RATIO: 7.72
BH CV XLRA - RV BASE: 3.1 CM
BH CV XLRA - RV LENGTH: 6.4 CM
BH CV XLRA - RV MID: 2.2 CM
BH CV XLRA - TDI S': 8.7 CM/SEC
LEFT ATRIUM VOLUME INDEX: 17.7 ML/M2
LEFT ATRIUM VOLUME: 33 ML
LV EF BIPLANE MOD: 68.6 %
SINUS: 3.1 CM
STJ: 2.46 CM

## 2025-03-25 ENCOUNTER — TELEPHONE (OUTPATIENT)
Dept: CARDIOLOGY | Facility: CLINIC | Age: 60
End: 2025-03-25
Payer: COMMERCIAL

## 2025-03-25 ENCOUNTER — OFFICE VISIT (OUTPATIENT)
Dept: INTERNAL MEDICINE | Facility: CLINIC | Age: 60
End: 2025-03-25
Payer: COMMERCIAL

## 2025-03-25 VITALS
DIASTOLIC BLOOD PRESSURE: 66 MMHG | WEIGHT: 160 LBS | BODY MASS INDEX: 25.71 KG/M2 | HEIGHT: 66 IN | SYSTOLIC BLOOD PRESSURE: 122 MMHG | TEMPERATURE: 97.1 F

## 2025-03-25 DIAGNOSIS — R35.0 URINE FREQUENCY: ICD-10-CM

## 2025-03-25 DIAGNOSIS — E78.2 MIXED HYPERLIPIDEMIA: ICD-10-CM

## 2025-03-25 DIAGNOSIS — R74.8 ELEVATED ALKALINE PHOSPHATASE LEVEL: ICD-10-CM

## 2025-03-25 DIAGNOSIS — Z95.2 STATUS POST AORTIC VALVE REPLACEMENT: ICD-10-CM

## 2025-03-25 DIAGNOSIS — I95.1 ORTHOSTASIS: ICD-10-CM

## 2025-03-25 DIAGNOSIS — Z00.00 ANNUAL PHYSICAL EXAM: Primary | ICD-10-CM

## 2025-03-25 DIAGNOSIS — E11.9 TYPE 2 DIABETES MELLITUS WITHOUT COMPLICATION, WITHOUT LONG-TERM CURRENT USE OF INSULIN: ICD-10-CM

## 2025-03-25 DIAGNOSIS — Z95.828 HISTORY OF AORTIC ARCH REPLACEMENT: ICD-10-CM

## 2025-03-25 DIAGNOSIS — Z95.0 STATUS POST PLACEMENT OF CARDIAC PACEMAKER: ICD-10-CM

## 2025-03-25 DIAGNOSIS — E03.9 ACQUIRED HYPOTHYROIDISM: ICD-10-CM

## 2025-03-25 DIAGNOSIS — R74.01 TRANSAMINITIS: ICD-10-CM

## 2025-03-25 LAB
BILIRUB BLD-MCNC: NEGATIVE MG/DL
CLARITY, POC: ABNORMAL
COLOR UR: ABNORMAL
GLUCOSE UR STRIP-MCNC: NEGATIVE MG/DL
KETONES UR QL: NEGATIVE
LEUKOCYTE EST, POC: ABNORMAL
NITRITE UR-MCNC: NEGATIVE MG/ML
PH UR: 6 [PH] (ref 5–8)
PROT UR STRIP-MCNC: NEGATIVE MG/DL
RBC # UR STRIP: NEGATIVE /UL
SP GR UR: 1.02 (ref 1–1.03)
UROBILINOGEN UR QL: NORMAL

## 2025-03-25 RX ORDER — RANOLAZINE 1000 MG/1
1000 TABLET, EXTENDED RELEASE ORAL 2 TIMES DAILY
Qty: 60 TABLET | Refills: 11 | Status: SHIPPED | OUTPATIENT
Start: 2025-03-25

## 2025-03-25 NOTE — TELEPHONE ENCOUNTER
"Caller: Kelli Pedro \"Carli\"    Relationship: Self    Best call back number: [phone]     Requested Prescriptions:   Requested Prescriptions      No prescriptions requested or ordered in this encounter        RANEXA 1000MG 2X DAILY    Pharmacy where request should be sent: [facility] [place] - [address] - [phone]  - [phone] FX     Last office visit with prescribing clinician: 3/14/2025   Last telemedicine visit with prescribing clinician: Visit date not found   Next office visit with prescribing clinician: Visit date not found     Additional details provided by patient: PATIENT WAS SENT A [employer] MESSAGE THAT THE DOSAGE WOULD BE INCREASED AND SENT TO HER PHARMACY.    Would you like a call back once the refill request has been completed: [x] Yes [] No    If the office needs to give you a call back, can they leave a voicemail: [x] Yes [] No    Arturo Zuleta Rep   03/25/25 10:21 CDT   "

## 2025-03-25 NOTE — PROGRESS NOTES
"    Chief Complaint  Annual Exam, Loss of Consciousness (Patient states she passed out this morning in the bathroom. ), Memory Loss, Dizziness (Patient states last week her blood pressure was  79/47), Nausea (Nausea and vomiting. ), and Urinary Frequency    Subjective        Kelli Pedro presents to [facility]  History of Present Illness  See below.     Objective   Vital Signs:  /66   Temp 97.1 °F (36.2 °C) (Temporal)   Ht 167.6 cm (65.98\")   Wt 72.6 kg (160 lb)   BMI 25.84 kg/m²   Estimated body mass index is 25.84 kg/m² as calculated from the following:    Height as of this encounter: 167.6 cm (65.98\").    Weight as of this encounter: 72.6 kg (160 lb).         Physical Exam  Constitutional:       General: She is not in acute distress.  HENT:      Head: Normocephalic and atraumatic.   Eyes:      Conjunctiva/sclera: Conjunctivae normal.      Pupils: Pupils are equal, round, and reactive to light.   Cardiovascular:      Rate and Rhythm: Normal rate and regular rhythm.      Heart sounds: Normal heart sounds.   Pulmonary:      Effort: Pulmonary effort is normal. No respiratory distress.      Breath sounds: Normal breath sounds.   Musculoskeletal:         General: No swelling.   Skin:     General: Skin is warm and dry.      Findings: No rash.   Neurological:      General: No focal deficit present.      Mental Status: She is alert and oriented to person, place, and time.   Psychiatric:      Comments: Tangential at times.        Result Review :  Labs from 3/21/2025:  1.  CMP showed slight elevations of transaminases with a slight elevation of alkaline phosphatase.  2.  CBC was unremarkable.  3.  Erythrocyte sedimentation rate was 9.  4.  Complement studies were normal.    Stress Test with PET Myocardial Perfusion with CT (Single Study)   Interpretation Summary    Moderate area of ischemia noted in the left ventricular apical region    Total perfusion defect with stress was 9% " and rest was 0.33%    Left ventricular ejection fraction is normal (Calculated EF = 69%).    LAD coronary flow reserve of 4.23, LCx 3.32, RCA 0.60 and total 3.88    Aortic valve replacement noted and pacemaker leads noted    Results for orders placed during the hospital encounter of 03/17/25    Adult Transthoracic Echo Complete W/ Cont if Necessary Per Protocol    Interpretation Summary    Left ventricular systolic function is normal. Calculated left ventricular EF = 68.6% Left ventricular ejection fraction appears to be 66 - 70%.    Left ventricular diastolic function was normal.    Estimated right ventricular systolic pressure from tricuspid regurgitation is normal (<35 mmHg).           Assessment and Plan   Diagnoses and all orders for this visit:    1. Urine frequency (Primary)  -     POC Urinalysis Dipstick, Multipro    2. Acquired hypothyroidism  -     TSH Rfx On Abnormal To Free T4    3. Elevated alkaline phosphatase level  -     Comprehensive metabolic panel  -     Ambulatory Referral to Gastroenterology  -     US Liver; Future    4. Mixed hyperlipidemia  -     Lipid Panel    5. Transaminitis  -     Ambulatory Referral to Gastroenterology  -     US Liver; Future    6. Type 2 diabetes mellitus without complication, without long-term current use of insulin  -     Microalbumin / Creatinine Urine Ratio - Urine, Clean Catch    Other orders  -     Microalbumin / Creatinine Urine Ratio -  -     T4F       Presents today for annual physical exam as well as follow-up of the below.    She again mentioned being on too many medications.  We went through her medications line by line in December and she gave me at least 1-2 reasons to remain on each medication.  I also have not really started any of these medications.  She has been on these medications predating our time together.  She sees multiple specialists.  I often feel like I am not managing very much for her.    She has now also added seeing Dr. Sr in  [place] with rheumatology.  I was unaware that she was even going to go to him.    She wants to discontinue her atorvastatin.  She thinks that it is the culprit for her memory issues and recently slightly elevated transaminases.  We also recently looked at her alkaline phosphatase with [facility].  She is going to get a liver ultrasound and go back to GI as well at her request.    She recently saw Dr. Bay and he stopped her beta-blocker 1.5-2 weeks ago.  She has been having primarily near syncope and fatigue and apparently had an issue with complete syncope this morning.  She had a recent MCOT that was negative.  He put her on midodrine on 3/14.  There was discussion of increasing her Ranexa that he started recently.  This has not been sent to the pharmacy yet, but the messages regarding this were just yesterday.  I will send a message to Dr. Bay to try and discuss her case with him.  He also recently did a myocardial perfusion scan that was abnormal.  She states that he is deciding next steps at this point.    She has seen endocrinology in [place] for her \"diabetes.\"  The highest hemoglobin A1c I have ever seen in her chart was 5.9 in September 2024.  All other hemoglobin A1c values for the last 3 years have been normal.  I would have concerns that some of her symptoms might be related to being on semaglutide.  She does not agree.      Urine dipstick shows no blood or protein.  Trace leukocyte esterase with negative nitrites.  I am not planning to culture this.    We will check the above labs.    She had a negative mammogram in December 2024.    She is status post hysterectomy.    She will be 60 in October and we will get a DEXA at that point in time.    She underwent colonoscopy with Dr. Cody in April 2023, which was normal.  Unclear at this point what her recall will be.    Counseled on appropriate vision and dental screening.  She sees Dr. Ni in [place] for eyes.   [facility] takes care of her " teeth.    Plan to have her back in 3 months or sooner if needed.  I will update her on the above labs when they are available and also try and discuss her case with Dr. Bay.      Follow Up   No follow-ups on file.  Patient was given instructions and counseling regarding her condition or for health maintenance advice. Please see specific information pulled into the AVS if appropriate.      HALLE Tidwell DO       Electronically signed by ELLEN Tidwell DO, 03/25/25, 9:24 AM CDT.

## 2025-03-26 ENCOUNTER — TELEPHONE (OUTPATIENT)
Dept: INTERNAL MEDICINE | Facility: CLINIC | Age: 60
End: 2025-03-26

## 2025-03-26 LAB
ALBUMIN SERPL-MCNC: 4.2 G/DL (ref 3.5–5.2)
ALBUMIN/CREAT UR: 8 MG/G CREAT (ref 0–29)
ALBUMIN/GLOB SERPL: 1.9 G/DL
ALP SERPL-CCNC: 136 U/L (ref 39–117)
ALT SERPL-CCNC: 27 U/L (ref 1–33)
AST SERPL-CCNC: 30 U/L (ref 1–32)
BILIRUB SERPL-MCNC: 0.7 MG/DL (ref 0–1.2)
BUN SERPL-MCNC: 11 MG/DL (ref 6–20)
BUN/CREAT SERPL: 12.1 (ref 7–25)
CALCIUM SERPL-MCNC: 9 MG/DL (ref 8.6–10.5)
CHLORIDE SERPL-SCNC: 93 MMOL/L (ref 98–107)
CHOLEST SERPL-MCNC: 155 MG/DL (ref 0–200)
CO2 SERPL-SCNC: 30.6 MMOL/L (ref 22–29)
CREAT SERPL-MCNC: 0.91 MG/DL (ref 0.57–1)
CREAT UR-MCNC: 145.2 MG/DL
EGFRCR SERPLBLD CKD-EPI 2021: 72.8 ML/MIN/1.73
GLOBULIN SER CALC-MCNC: 2.2 GM/DL
GLUCOSE SERPL-MCNC: 92 MG/DL (ref 65–99)
HDLC SERPL-MCNC: 69 MG/DL (ref 40–60)
LDLC SERPL CALC-MCNC: 74 MG/DL (ref 0–100)
MICROALBUMIN UR-MCNC: 11.7 UG/ML
POTASSIUM SERPL-SCNC: 3.8 MMOL/L (ref 3.5–5.2)
PROT SERPL-MCNC: 6.4 G/DL (ref 6–8.5)
SODIUM SERPL-SCNC: 133 MMOL/L (ref 136–145)
T4 FREE SERPL-MCNC: 1.29 NG/DL (ref 0.93–1.7)
TRIGL SERPL-MCNC: 57 MG/DL (ref 0–150)
TSH SERPL DL<=0.005 MIU/L-ACNC: 5.5 UIU/ML (ref 0.27–4.2)
VLDLC SERPL CALC-MCNC: 12 MG/DL (ref 5–40)

## 2025-03-26 RX ORDER — PROMETHAZINE HYDROCHLORIDE 25 MG/1
TABLET ORAL
Qty: 30 TABLET | Refills: 1 | OUTPATIENT
Start: 2025-03-26

## 2025-03-26 RX ORDER — MIDODRINE HYDROCHLORIDE 2.5 MG/1
2.5 TABLET ORAL
Qty: 90 TABLET | Refills: 3 | OUTPATIENT
Start: 2025-03-26

## 2025-03-26 NOTE — TELEPHONE ENCOUNTER
"  Caller: Kelli Pedro \"Carli\"    Relationship: Self    Best call back number:   Telephone Information:   Mobile [phone]         What is the best time to reach you: ANY    Who are you requesting to speak with (clinical staff, provider,  specific staff member): CLINICAL    What was the call regarding: PT CALLED WANTING TO SPEAK WITH KAIA REGARDING LAB RESULTS AND ULTRASOUND. PLEASE CALL BACK PREFERABLY ASAP      "

## 2025-03-26 NOTE — TELEPHONE ENCOUNTER
"Relay   LVM  \"VM LEFT. SHE NEEDS TO REQUEST PREFERRED PHARMACY CALL [facility] AND THE PRESCRIPTION TRANSFERRED.PLEASE UPDATE HER PREFERRED PHARMACY, IE. A NAME.\"                "

## 2025-03-27 ENCOUNTER — HOSPITAL ENCOUNTER (OUTPATIENT)
Facility: HOSPITAL | Age: 60
Setting detail: OBSERVATION
Discharge: HOME | End: 2025-03-28
Attending: FAMILY MEDICINE | Admitting: INTERNAL MEDICINE
Payer: COMMERCIAL

## 2025-03-27 ENCOUNTER — OFFICE VISIT (OUTPATIENT)
Dept: CARDIOLOGY | Facility: CLINIC | Age: 60
End: 2025-03-27
Payer: COMMERCIAL

## 2025-03-27 ENCOUNTER — HOSPITAL ENCOUNTER (EMERGENCY)
Facility: HOSPITAL | Age: 60
Discharge: HOME | End: 2025-03-27
Attending: EMERGENCY MEDICINE
Payer: COMMERCIAL

## 2025-03-27 VITALS
HEIGHT: 66 IN | BODY MASS INDEX: 26.84 KG/M2 | HEART RATE: 65 BPM | OXYGEN SATURATION: 93 % | RESPIRATION RATE: 16 BRPM | WEIGHT: 167 LBS | TEMPERATURE: 98 F | SYSTOLIC BLOOD PRESSURE: 95 MMHG | DIASTOLIC BLOOD PRESSURE: 68 MMHG

## 2025-03-27 VITALS — BODY MASS INDEX: 26.84 KG/M2 | HEIGHT: 66 IN | WEIGHT: 167 LBS

## 2025-03-27 DIAGNOSIS — E78.2 MIXED HYPERLIPIDEMIA: ICD-10-CM

## 2025-03-27 DIAGNOSIS — R07.89 CHEST PAIN, ATYPICAL: Primary | ICD-10-CM

## 2025-03-27 DIAGNOSIS — I95.1 ORTHOSTASIS: Primary | ICD-10-CM

## 2025-03-27 DIAGNOSIS — J01.90 ACUTE BACTERIAL SINUSITIS: ICD-10-CM

## 2025-03-27 DIAGNOSIS — I10 PRIMARY HYPERTENSION: ICD-10-CM

## 2025-03-27 DIAGNOSIS — R94.39 ABNORMAL NUCLEAR STRESS TEST: ICD-10-CM

## 2025-03-27 DIAGNOSIS — R07.89 ATYPICAL CHEST PAIN: ICD-10-CM

## 2025-03-27 DIAGNOSIS — R06.09 DOE (DYSPNEA ON EXERTION): ICD-10-CM

## 2025-03-27 DIAGNOSIS — B96.89 ACUTE BACTERIAL SINUSITIS: ICD-10-CM

## 2025-03-27 DIAGNOSIS — R07.9 CHEST PAIN, UNSPECIFIED TYPE: ICD-10-CM

## 2025-03-27 DIAGNOSIS — Z95.2 S/P AVR (AORTIC VALVE REPLACEMENT): ICD-10-CM

## 2025-03-27 DIAGNOSIS — R55 SYNCOPE AND COLLAPSE: Primary | ICD-10-CM

## 2025-03-27 LAB
ALBUMIN SERPL-MCNC: 3.9 G/DL (ref 3.5–5.2)
ALBUMIN/GLOB SERPL: 1.6 G/DL
ALP SERPL-CCNC: 121 U/L (ref 39–117)
ALT SERPL W P-5'-P-CCNC: 25 U/L (ref 1–33)
ANION GAP SERPL CALCULATED.3IONS-SCNC: 9 MMOL/L (ref 5–15)
AST SERPL-CCNC: 29 U/L (ref 1–32)
BASOPHILS # BLD AUTO: 0.03 10*3/MM3 (ref 0–0.2)
BASOPHILS NFR BLD AUTO: 0.5 % (ref 0–1.5)
BILIRUB SERPL-MCNC: 0.7 MG/DL (ref 0–1.2)
BUN SERPL-MCNC: 11 MG/DL (ref 6–20)
BUN/CREAT SERPL: 11.7 (ref 7–25)
CALCIUM SPEC-SCNC: 8.6 MG/DL (ref 8.6–10.5)
CHLORIDE SERPL-SCNC: 95 MMOL/L (ref 98–107)
CO2 SERPL-SCNC: 30 MMOL/L (ref 22–29)
CREAT SERPL-MCNC: 0.94 MG/DL (ref 0.57–1)
DEPRECATED RDW RBC AUTO: 42.5 FL (ref 37–54)
EGFRCR SERPLBLD CKD-EPI 2021: 70 ML/MIN/1.73
EOSINOPHIL # BLD AUTO: 0.07 10*3/MM3 (ref 0–0.4)
EOSINOPHIL NFR BLD AUTO: 1.2 % (ref 0.3–6.2)
ERYTHROCYTE [DISTWIDTH] IN BLOOD BY AUTOMATED COUNT: 13.2 % (ref 12.3–15.4)
GLOBULIN UR ELPH-MCNC: 2.4 GM/DL
GLUCOSE SERPL-MCNC: 82 MG/DL (ref 65–99)
HCT VFR BLD AUTO: 38.9 % (ref 34–46.6)
HGB BLD-MCNC: 13.1 G/DL (ref 12–15.9)
IMM GRANULOCYTES # BLD AUTO: 0.02 10*3/MM3 (ref 0–0.05)
IMM GRANULOCYTES NFR BLD AUTO: 0.3 % (ref 0–0.5)
LYMPHOCYTES # BLD AUTO: 1.19 10*3/MM3 (ref 0.7–3.1)
LYMPHOCYTES NFR BLD AUTO: 20.2 % (ref 19.6–45.3)
MAGNESIUM SERPL-MCNC: 1.9 MG/DL (ref 1.6–2.6)
MCH RBC QN AUTO: 29.6 PG (ref 26.6–33)
MCHC RBC AUTO-ENTMCNC: 33.7 G/DL (ref 31.5–35.7)
MCV RBC AUTO: 87.8 FL (ref 79–97)
MONOCYTES # BLD AUTO: 0.4 10*3/MM3 (ref 0.1–0.9)
MONOCYTES NFR BLD AUTO: 6.8 % (ref 5–12)
NEUTROPHILS NFR BLD AUTO: 4.18 10*3/MM3 (ref 1.7–7)
NEUTROPHILS NFR BLD AUTO: 71 % (ref 42.7–76)
NRBC BLD AUTO-RTO: 0 /100 WBC (ref 0–0.2)
PLATELET # BLD AUTO: 200 10*3/MM3 (ref 140–450)
PMV BLD AUTO: 10.7 FL (ref 6–12)
POTASSIUM SERPL-SCNC: 3.3 MMOL/L (ref 3.5–5.2)
PROT SERPL-MCNC: 6.3 G/DL (ref 6–8.5)
RBC # BLD AUTO: 4.43 10*6/MM3 (ref 3.77–5.28)
SODIUM SERPL-SCNC: 134 MMOL/L (ref 136–145)
T4 FREE SERPL-MCNC: 1.26 NG/DL (ref 0.93–1.7)
TSH SERPL DL<=0.05 MIU/L-ACNC: 4.79 UIU/ML (ref 0.27–4.2)
WBC NRBC COR # BLD AUTO: 5.89 10*3/MM3 (ref 3.4–10.8)

## 2025-03-27 PROCEDURE — 99285 EMERGENCY DEPT VISIT HI MDM: CPT

## 2025-03-27 PROCEDURE — 99223 1ST HOSP IP/OBS HIGH 75: CPT | Performed by: INTERNAL MEDICINE

## 2025-03-27 PROCEDURE — 85025 COMPLETE CBC W/AUTO DIFF WBC: CPT | Performed by: EMERGENCY MEDICINE

## 2025-03-27 PROCEDURE — G0378 HOSPITAL OBSERVATION PER HR: HCPCS

## 2025-03-27 PROCEDURE — 96361 HYDRATE IV INFUSION ADD-ON: CPT

## 2025-03-27 PROCEDURE — 96360 HYDRATION IV INFUSION INIT: CPT

## 2025-03-27 PROCEDURE — 99213 OFFICE O/P EST LOW 20 MIN: CPT | Performed by: INTERNAL MEDICINE

## 2025-03-27 PROCEDURE — 25810000003 SODIUM CHLORIDE 0.9 % SOLUTION: Performed by: NURSE PRACTITIONER

## 2025-03-27 PROCEDURE — 99283 EMERGENCY DEPT VISIT LOW MDM: CPT

## 2025-03-27 PROCEDURE — 36415 COLL VENOUS BLD VENIPUNCTURE: CPT

## 2025-03-27 PROCEDURE — 84443 ASSAY THYROID STIM HORMONE: CPT | Performed by: EMERGENCY MEDICINE

## 2025-03-27 PROCEDURE — 83735 ASSAY OF MAGNESIUM: CPT | Performed by: EMERGENCY MEDICINE

## 2025-03-27 PROCEDURE — 80053 COMPREHEN METABOLIC PANEL: CPT | Performed by: EMERGENCY MEDICINE

## 2025-03-27 PROCEDURE — 93005 ELECTROCARDIOGRAM TRACING: CPT | Performed by: EMERGENCY MEDICINE

## 2025-03-27 PROCEDURE — 84439 ASSAY OF FREE THYROXINE: CPT | Performed by: EMERGENCY MEDICINE

## 2025-03-27 PROCEDURE — 93010 ELECTROCARDIOGRAM REPORT: CPT | Performed by: INTERNAL MEDICINE

## 2025-03-27 PROCEDURE — 25810000003 SODIUM CHLORIDE 0.9 % SOLUTION: Performed by: EMERGENCY MEDICINE

## 2025-03-27 RX ORDER — ACETAMINOPHEN 160 MG/5ML
650 SOLUTION ORAL EVERY 4 HOURS PRN
Status: DISCONTINUED | OUTPATIENT
Start: 2025-03-27 | End: 2025-03-28 | Stop reason: HOSPADM

## 2025-03-27 RX ORDER — ASPIRIN 81 MG/1
81 TABLET, CHEWABLE ORAL DAILY
Status: DISCONTINUED | OUTPATIENT
Start: 2025-03-27 | End: 2025-03-28 | Stop reason: HOSPADM

## 2025-03-27 RX ORDER — ZOLPIDEM TARTRATE 5 MG/1
5 TABLET ORAL NIGHTLY
Status: DISCONTINUED | OUTPATIENT
Start: 2025-03-27 | End: 2025-03-28 | Stop reason: HOSPADM

## 2025-03-27 RX ORDER — FERROUS SULFATE 325(65) MG
325 TABLET ORAL 3 TIMES WEEKLY
Status: DISCONTINUED | OUTPATIENT
Start: 2025-03-28 | End: 2025-03-28 | Stop reason: HOSPADM

## 2025-03-27 RX ORDER — FLUTICASONE PROPIONATE 50 MCG
2 SPRAY, SUSPENSION (ML) NASAL DAILY
Status: DISCONTINUED | OUTPATIENT
Start: 2025-03-27 | End: 2025-03-28 | Stop reason: HOSPADM

## 2025-03-27 RX ORDER — PROMETHAZINE HYDROCHLORIDE 25 MG/1
25 TABLET ORAL EVERY 6 HOURS PRN
COMMUNITY

## 2025-03-27 RX ORDER — PROMETHAZINE HYDROCHLORIDE 25 MG/1
25 TABLET ORAL EVERY 6 HOURS PRN
Status: DISCONTINUED | OUTPATIENT
Start: 2025-03-27 | End: 2025-03-28 | Stop reason: HOSPADM

## 2025-03-27 RX ORDER — PANTOPRAZOLE SODIUM 40 MG/1
40 TABLET, DELAYED RELEASE ORAL
Status: DISCONTINUED | OUTPATIENT
Start: 2025-03-28 | End: 2025-03-28 | Stop reason: HOSPADM

## 2025-03-27 RX ORDER — LEVOTHYROXINE SODIUM 75 UG/1
37.5 TABLET ORAL WEEKLY
COMMUNITY

## 2025-03-27 RX ORDER — ACETAMINOPHEN 325 MG/1
650 TABLET ORAL EVERY 4 HOURS PRN
Status: DISCONTINUED | OUTPATIENT
Start: 2025-03-27 | End: 2025-03-28 | Stop reason: HOSPADM

## 2025-03-27 RX ORDER — LUBIPROSTONE 8 UG/1
8 CAPSULE ORAL 2 TIMES DAILY
Status: DISCONTINUED | OUTPATIENT
Start: 2025-03-27 | End: 2025-03-28 | Stop reason: HOSPADM

## 2025-03-27 RX ORDER — SODIUM CHLORIDE 0.9 % (FLUSH) 0.9 %
10 SYRINGE (ML) INJECTION EVERY 12 HOURS SCHEDULED
Status: DISCONTINUED | OUTPATIENT
Start: 2025-03-27 | End: 2025-03-28 | Stop reason: HOSPADM

## 2025-03-27 RX ORDER — NITROGLYCERIN 0.4 MG/1
0.4 TABLET SUBLINGUAL
Status: DISCONTINUED | OUTPATIENT
Start: 2025-03-27 | End: 2025-03-28 | Stop reason: HOSPADM

## 2025-03-27 RX ORDER — RANOLAZINE 500 MG/1
500 TABLET, EXTENDED RELEASE ORAL 2 TIMES DAILY
Status: DISCONTINUED | OUTPATIENT
Start: 2025-03-27 | End: 2025-03-28 | Stop reason: HOSPADM

## 2025-03-27 RX ORDER — ONDANSETRON 4 MG/1
4 TABLET, ORALLY DISINTEGRATING ORAL EVERY 8 HOURS PRN
COMMUNITY

## 2025-03-27 RX ORDER — PILOCARPINE HYDROCHLORIDE 5 MG/1
5 TABLET, FILM COATED ORAL 3 TIMES DAILY
Status: DISCONTINUED | OUTPATIENT
Start: 2025-03-27 | End: 2025-03-28 | Stop reason: HOSPADM

## 2025-03-27 RX ORDER — SODIUM CHLORIDE 0.9 % (FLUSH) 0.9 %
10 SYRINGE (ML) INJECTION AS NEEDED
Status: DISCONTINUED | OUTPATIENT
Start: 2025-03-27 | End: 2025-03-27 | Stop reason: HOSPADM

## 2025-03-27 RX ORDER — ONDANSETRON 4 MG/1
4 TABLET, ORALLY DISINTEGRATING ORAL EVERY 8 HOURS PRN
Status: DISCONTINUED | OUTPATIENT
Start: 2025-03-27 | End: 2025-03-28 | Stop reason: HOSPADM

## 2025-03-27 RX ORDER — HYDROXYZINE HYDROCHLORIDE 25 MG/1
25 TABLET, FILM COATED ORAL EVERY 6 HOURS PRN
Status: DISCONTINUED | OUTPATIENT
Start: 2025-03-27 | End: 2025-03-28 | Stop reason: HOSPADM

## 2025-03-27 RX ORDER — SODIUM CHLORIDE 9 MG/ML
100 INJECTION, SOLUTION INTRAVENOUS CONTINUOUS
Status: DISCONTINUED | OUTPATIENT
Start: 2025-03-27 | End: 2025-03-28 | Stop reason: HOSPADM

## 2025-03-27 RX ORDER — LEVOTHYROXINE SODIUM 75 UG/1
75 TABLET ORAL
Status: DISCONTINUED | OUTPATIENT
Start: 2025-03-28 | End: 2025-03-28 | Stop reason: HOSPADM

## 2025-03-27 RX ORDER — MIDODRINE HYDROCHLORIDE 2.5 MG/1
5 TABLET ORAL
Status: DISCONTINUED | OUTPATIENT
Start: 2025-03-27 | End: 2025-03-28 | Stop reason: HOSPADM

## 2025-03-27 RX ORDER — SODIUM CHLORIDE 0.9 % (FLUSH) 0.9 %
10 SYRINGE (ML) INJECTION AS NEEDED
Status: DISCONTINUED | OUTPATIENT
Start: 2025-03-27 | End: 2025-03-28 | Stop reason: HOSPADM

## 2025-03-27 RX ORDER — ACETAMINOPHEN 650 MG/1
650 SUPPOSITORY RECTAL EVERY 4 HOURS PRN
Status: DISCONTINUED | OUTPATIENT
Start: 2025-03-27 | End: 2025-03-28 | Stop reason: HOSPADM

## 2025-03-27 RX ORDER — PAROXETINE 20 MG/1
40 TABLET, FILM COATED ORAL EVERY MORNING
Status: DISCONTINUED | OUTPATIENT
Start: 2025-03-28 | End: 2025-03-28 | Stop reason: HOSPADM

## 2025-03-27 RX ORDER — LEVOTHYROXINE SODIUM 75 UG/1
75 TABLET ORAL
COMMUNITY

## 2025-03-27 RX ORDER — SODIUM CHLORIDE 9 MG/ML
40 INJECTION, SOLUTION INTRAVENOUS AS NEEDED
Status: DISCONTINUED | OUTPATIENT
Start: 2025-03-27 | End: 2025-03-28 | Stop reason: HOSPADM

## 2025-03-27 RX ORDER — PROCHLORPERAZINE EDISYLATE 5 MG/ML
10 INJECTION INTRAMUSCULAR; INTRAVENOUS ONCE
Status: DISCONTINUED | OUTPATIENT
Start: 2025-03-27 | End: 2025-03-27

## 2025-03-27 RX ORDER — MYCOPHENOLATE MOFETIL 500 MG/1
500 TABLET ORAL DAILY
Status: DISCONTINUED | OUTPATIENT
Start: 2025-03-27 | End: 2025-03-28 | Stop reason: HOSPADM

## 2025-03-27 RX ORDER — POTASSIUM CHLORIDE 1500 MG/1
40 TABLET, EXTENDED RELEASE ORAL EVERY 4 HOURS
Status: DISPENSED | OUTPATIENT
Start: 2025-03-27 | End: 2025-03-27

## 2025-03-27 RX ADMIN — RANOLAZINE 500 MG: 500 TABLET, FILM COATED, EXTENDED RELEASE ORAL at 21:08

## 2025-03-27 RX ADMIN — Medication 400 MG: at 16:37

## 2025-03-27 RX ADMIN — SODIUM CHLORIDE 100 ML/HR: 9 INJECTION, SOLUTION INTRAVENOUS at 16:36

## 2025-03-27 RX ADMIN — MIDODRINE HYDROCHLORIDE 5 MG: 2.5 TABLET ORAL at 16:35

## 2025-03-27 RX ADMIN — POTASSIUM CHLORIDE 40 MEQ: 1500 TABLET, EXTENDED RELEASE ORAL at 16:35

## 2025-03-27 RX ADMIN — PILOCARPINE HYDRCHLORIDE 5 MG: 5 TABLET, FILM COATED ORAL at 21:05

## 2025-03-27 RX ADMIN — HYDROXYZINE HYDROCHLORIDE 25 MG: 25 TABLET ORAL at 21:03

## 2025-03-27 RX ADMIN — PILOCARPINE HYDRCHLORIDE 5 MG: 5 TABLET, FILM COATED ORAL at 16:37

## 2025-03-27 RX ADMIN — ASPIRIN 81 MG: 81 TABLET, CHEWABLE ORAL at 16:37

## 2025-03-27 RX ADMIN — SODIUM CHLORIDE 1000 ML: 9 INJECTION, SOLUTION INTRAVENOUS at 08:28

## 2025-03-27 RX ADMIN — Medication 10 ML: at 21:09

## 2025-03-27 NOTE — ED PROVIDER NOTES
Subjective   History of Present Illness  Patient presents with a complaint of multiple episodes of falling were she seems to get suddenly weak and then passed out briefly she has had this happen to her off and on over the last couple weeks.  It happened again today while she was working she nearly passed out then.  She says she has always had low blood pressure but here lately it has been even lower.  She has talked to Dr. Tidwell and to Dr. Bay and they have been adjusting her medications to try to help this problem but had another episode this morning so she came in to be checked out.  She denies any specific injury from the falls.  She just is worried because she feels she got a pass out all the time because her blood pressure is borderline.    History provided by:  Patient   used: No    Syncope  Episode history:  Multiple  Most recent episode:  More than 2 days ago  Duration:  2 minutes  Timing:  Constant  Progression:  Resolved  Chronicity:  Recurrent  Context: not blood draw, not bowel movement, not dehydration, not exertion, not inactivity, not medication change, not with normal activity, not sight of blood, not sitting down, not standing up and not urination    Witnessed: yes    Relieved by:  Nothing  Worsened by:  Nothing  Ineffective treatments:  None tried  Associated symptoms: dizziness, recent fall and weakness    Associated symptoms: no anxiety, no chest pain, no confusion, no diaphoresis, no difficulty breathing, no fever, no focal sensory loss, no focal weakness, no headaches, no malaise/fatigue, no nausea, no palpitations, no recent injury, no recent surgery, no rectal bleeding, no seizures, no shortness of breath, no visual change and no vomiting    Risk factors: no congenital heart disease, no coronary artery disease, no seizures and no vascular disease        Review of Systems   Constitutional:  Negative for diaphoresis, fever and malaise/fatigue.   HENT: Negative.      Respiratory: Negative.  Negative for shortness of breath.    Cardiovascular:  Positive for syncope. Negative for chest pain and palpitations.   Gastrointestinal: Negative.  Negative for nausea and vomiting.   Genitourinary: Negative.    Musculoskeletal: Negative.    Skin: Negative.    Neurological:  Positive for dizziness and weakness. Negative for focal weakness, seizures and headaches.   Psychiatric/Behavioral:  Negative for confusion. The patient is nervous/anxious.    All other systems reviewed and are negative.      Past Medical History:   Diagnosis Date    AAA (abdominal aortic aneurysm)     Abnormal ECG 03/14/2023    Allergic     Anemia     Angina pectoris     Anxiety     Anxiety and depression 06/25/2024    Aortic valve replaced 07/29/2024    Arthritis     Asthma     Bicuspid aortic valve 03/14/2023    Cataract     Surgery 2022    Cholelithiasis     Chronic kidney disease     Clotting disorder     Seeing Leticia Flores at [facility] currently    Colon polyp     Coronary artery disease     Deep vein thrombosis     Dental disease     Diabetes mellitus     Diverticulitis of colon     Diverticulosis     GERD (gastroesophageal reflux disease)     Hashimoto's thyroiditis     In [facility]    Headache     Heart murmur     Hernia     History of medical problems     Aortic aneurysms    HL (hearing loss)     Hypernatremia     In [facility]    Hypertension     Hyperthyroidism     Hypoglycemia     In [facility]    Hypothyroid     Ingrown toenail     Irritable bowel syndrome     Kidney stone     Previous haf lithotripsy    Low back pain     Lupus     Mitral valve prolapse     In the [facility] Dr. Claudio Gibson    Mixed hyperlipidemia 11/18/2024    Neuromuscular disorder     NSTEMI (non-ST elevated myocardial infarction) 12/26/2023    Obesity     Osteopenia     Pancreatitis     Peptic ulcer disease     Peptic ulceration     Plantar fasciitis     Past    Pneumonia     RBBB 03/14/2023    Scoliosis     Sinusitis      "Sjogren's disease     Sleep apnea     Years ago, but no longer have since Gastric Sleeve Surgery    Tinnitus     Ulcerative colitis     Urinary tract infection     Visual impairment     Vitamin D deficiency     In MyChart. I take Vit D daily       Allergies   Allergen Reactions    Clavulanic Acid Nausea And Vomiting    Gadolinium Other (See Comments)     \"passed out and was shaking all over. Had to spend the night at hospital\"  NAUSEA/POS SYNCOPE  \"passed out and was shaking all over. Had to spend the night at hospital\"  NAUSEA/POS SYNCOPE  \"passed out and was shaking all over. Had to spend the night at hospital\"  \"passed out and was shaking all over. Had to spend the night at hospital\"  NAUSEA/POS SYNCOPE  NAUSEA/POS SYNCOPE      Verapamil Hcl Er Anaphylaxis    Zoster Vac Recomb Adjuvanted Swelling    Flagyl [Metronidazole] Diarrhea     Abdominal pain     Arava [Leflunomide] Rash     Abnormal labs     Estradiol Rash    Hydroxychloroquine GI Intolerance     Abdominal pain     Metformin Unknown - Low Severity    Prednisone Other (See Comments)     Increase in BP    Protonix [Pantoprazole] Nausea Only and Myalgia    Tetracyclines & Related Rash    Trazodone Itching, Other (See Comments) and Unknown (See Comments)     Blurred vision  Blurred vision  Blurred vision  Blurred vision         Past Surgical History:   Procedure Laterality Date    ABDOMINAL AORTIC ANEURYSM REPAIR  06/18/2024    ABDOMINAL SURGERY      ANKLE SURGERY      AORTIC VALVE REPAIR/REPLACEMENT  06/2024    AORTIC VALVE SURGERY      ARTERIAL ANEURYSM REPAIR      ASCENDING ARCH/HEMIARCH REPLACEMENT  06/18/2024    BACK SURGERY      BARIATRIC SURGERY      CARDIAC CATHETERIZATION N/A 12/26/2023    Procedure: Left Heart Cath;  Surgeon: Pablito De Leon DO;  Location:  [facility] CATH [facility]ASIVE [facility];  Service: Cardiovascular;  Laterality: N/A;    CARDIAC CATHETERIZATION  12/26/2023    CARDIAC SURGERY  06/2024    CARDIAC VALVE REPLACEMENT  06/2024    " CARPAL TUNNEL RELEASE Bilateral     CHOLECYSTECTOMY      COLONOSCOPY  04/24/2023    normal colon consistent with ibs    ENDOSCOPY  04/24/2023    large hitial hernia,chronic gastritis    EYE SURGERY  2022    Cataracts both eyes    FLEXIBLE SIGMOIDOSCOPY      FOOT SURGERY      GALLBLADDER SURGERY      GASTRIC RESTRICTION SURGERY  2017    HYSTERECTOMY      INSERT / REPLACE / REMOVE PACEMAKER      KIDNEY STONE SURGERY      LITHOTRIPSY      PACEMAKER IMPLANTATION  06/2024    SHOULDER SURGERY      SUBTOTAL HYSTERECTOMY      TOENAIL EXCISION      TRICUSPID VALVE REPLACEMENT      TUBAL ABDOMINAL LIGATION      UPPER GASTROINTESTINAL ENDOSCOPY      VEIN SURGERY      [facility]       Family History   Problem Relation Age of Onset    Breast cancer Mother 70    Diabetes Mother     Hearing loss Mother     Heart disease Mother     Hyperlipidemia Mother     Vision loss Mother     Clotting disorder Mother     Fainting Mother     Hypertension Mother     Cancer Mother     Migraines Mother     Inflammatory bowel disease Mother     Scleroderma Father     Diabetes Father     Hearing loss Father     Heart disease Father     Hyperlipidemia Father     Vision loss Father     Clotting disorder Father     Fainting Father     Heart attack Father     Hypertension Father     Cancer Father     Stroke Father     Heart valve disorder Father     Colon polyps Father     Inflammatory bowel disease Father     Ulcerative colitis Father     Obesity Father     Diabetes Sister     Hyperlipidemia Sister     Vision loss Sister     Asthma Sister     Hypertension Sister     Thyroid disease Sister     Obesity Sister     Hyperlipidemia Brother     Vision loss Brother     Asthma Brother     Hypertension Brother     Diabetes Brother     Early death Brother     Obesity Brother     Colon cancer Neg Hx        Social History     Socioeconomic History    Marital status:    Tobacco Use    Smoking status: Former     Current packs/day: 0.25      Average packs/day: 0.3 packs/day for 43.8 years (11.0 ttl pk-yrs)     Types: Cigarettes     Start date: 1/1/1991     Quit date: 1/1/1987     Passive exposure: Past    Smokeless tobacco: Never    Tobacco comments:     [id number] for 6 months only   Vaping Use    Vaping status: Never Used   Substance and Sexual Activity    Alcohol use: Not Currently     Comment: Socially very rare    Drug use: Never    Sexual activity: Yes     Partners: Male     Birth control/protection: None, Partner of same sex     Comment: Thats one male patner i live with       Prior to Admission medications    Medication Sig Start Date End Date Taking? Authorizing Provider   aspirin 81 MG chewable tablet Chew 1 tablet Daily. 6/18/24 6/29/25  ProviderLiss MD   Continuous Glucose  (FreeStyle Aydin 2 [place]) device Use 1 each Continuous.    Liss English MD   Continuous Glucose Sensor (FreeStyle Aydin 2 Sensor) misc Use 1 each Every 14 (Fourteen) Days.    Liss English MD   eszopiclone (LUNESTA) 3 MG tablet Take 1 tablet by mouth Every Night. Take immediately before bedtime 3/10/25   ELLEN Tidwell,    ferrous sulfate 325 (65 FE) MG tablet Take 1 tablet by mouth 3 (Three) Times a Week. 11/8/24   Leticia Flores APRN   fluticasone (FLONASE) 50 MCG/ACT nasal spray 2 sprays into the nostril(s) as directed by provider Daily. 2/12/24   ELLEN Tidwell DO   Galcanezumab-gnlm (Emgality) 120 MG/ML solution prefilled syringe Inject 1 mL under the skin into the appropriate area as directed Every 30 (Thirty) Days. 1/6/25   ELLEN Tidwell DO   hydroCHLOROthiazide 25 MG tablet Take 1 tablet by mouth Daily. 3/10/25   ELLEN Tidwell DO   hydrOXYzine (ATARAX) 25 MG tablet Take 1 tablet by mouth Every 6 (Six) Hours As Needed for Anxiety. 1/30/25   ELLEN Tidwell DO   lansoprazole (PREVACID) 30 MG capsule Take 1 capsule by mouth Daily. 4/30/24   ELLEN Tidwell DO   levothyroxine  (Synthroid) 75 MCG tablet Take 1/2 tablet on Wednesdays. Skip Sunday dose. Take one tablet daily Qofpdg-Ipualyz-Bacbxvjk-Friday-Saturday. 8/22/24   ELLEN Tidwell DO   linaclotide (Linzess) 145 MCG capsule capsule Take 1 capsule by mouth Every Morning Before Breakfast. 3/10/25   ELLEN Tidwell DO   magnesium oxide (MAG-OX) 400 MG tablet Take 1 tablet by mouth Daily. 4/30/24   ELLEN Tidwell DO   midodrine (PROAMATINE) 2.5 MG tablet Take 1 tablet by mouth 3 (Three) Times a Day Before Meals. 3/14/25   Gavino Bay MD   mycophenolate (CELLCEPT) 500 MG tablet Take 1 tablet by mouth Daily. 3/10/25   ELLEN Tidwell DO   Narcan 4 MG/0.1ML nasal spray Administer 1 spray into the nostril(s) as directed by provider As Needed. 7/1/24   Liss English MD   nitroglycerin (NITROSTAT) 0.4 MG SL tablet Place 1 tablet under the tongue Every 5 (Five) Minutes As Needed for Chest Pain. Take no more than 3 doses in 15 minutes. 2/21/25   Sharon Maddox APRN   ondansetron ODT (ZOFRAN-ODT) 4 MG disintegrating tablet Place 1 tablet on the tongue Every 8 (Eight) Hours As Needed for Nausea or Vomiting. 1/13/25   ELLEN Tidwell DO   PARoxetine (Paxil) 40 MG tablet Take 1 tablet by mouth Every Morning. 3/19/25   ELLEN Tidwell DO   pilocarpine (SALAGEN) 5 MG tablet Take 1 tablet by mouth 3 (Three) Times a Day.    Liss English MD   potassium chloride (KLOR-CON M10) 10 MEQ CR tablet Take 1 tablet by mouth 2 (Two) Times a Day. 3/10/25   ELLEN Tidwell DO   potassium chloride 10 MEQ CR tablet Take 1 tablet by mouth Every 12 (Twelve) Hours. 2/12/25   Liss English MD   promethazine (PHENERGAN) 25 MG tablet TAKE ONE TABLET BY MOUTH EVERY 6 HOURS AS NEEDED FOR NAUSEA AND VOMITING 3/14/25   ELLEN Tidwell,    ranolazine (Ranexa) 1000 MG 12 hr tablet Take 1 tablet by mouth 2 (Two) Times a Day. 3/25/25   Sharon Maddox APRN   Semaglutide, 1 MG/DOSE,  (OZEMPIC) 4 MG/3ML solution pen-injector Inject 1 mg under the skin into the appropriate area as directed 1 (One) Time Per Week. 3/10/25   ELLEN Tidwell, DO   ubrogepant (UBRELVY) 100 MG tablet Take 1 tablet at the onset of headache. May repeat dose x1 in 2 hours if headache is not resolved. Max dose 200 mg/24 hours. 3/19/25   ELLEN Tidwell, DO       Medications   sodium chloride 0.9 % flush 10 mL (has no administration in time range)   sodium chloride 0.9 % bolus 1,000 mL (0 mL Intravenous Stopped 3/27/25 0940)       Vitals:    03/27/25 1016   BP: 95/68   Pulse: 65   Resp:    Temp:    SpO2: 93%         Objective   Physical Exam  Vitals and nursing note reviewed.   Constitutional:       Appearance: Normal appearance.   HENT:      Head: Normocephalic and atraumatic.   Eyes:      Extraocular Movements: Extraocular movements intact.      Pupils: Pupils are equal, round, and reactive to light.   Cardiovascular:      Rate and Rhythm: Normal rate and regular rhythm.   Pulmonary:      Effort: Pulmonary effort is normal.      Breath sounds: Normal breath sounds.   Abdominal:      General: Abdomen is flat.      Palpations: Abdomen is soft.   Musculoskeletal:         General: Normal range of motion.      Cervical back: Normal range of motion.   Skin:     General: Skin is warm and dry.   Neurological:      General: No focal deficit present.      Mental Status: She is alert and oriented to person, place, and time.   Psychiatric:         Mood and Affect: Mood normal.         Behavior: Behavior normal.         Procedures         Lab Results (last 24 hours)       Procedure Component Value Units Date/Time    CBC & Differential [[id number]]  (Normal) Collected: 03/27/25 0827    Specimen: Blood Updated: 03/27/25 0840    Narrative:      The following orders were created for panel order CBC & Differential.  Procedure                               Abnormality         Status                     ---------                                -----------         ------                     CBC Auto Differential[[id number]]        Normal              Final result                 Please view results for these tests on the individual orders.    Comprehensive Metabolic Panel [[id number]]  (Abnormal) Collected: 03/27/25 0827    Specimen: Blood Updated: 03/27/25 0859     Glucose 82 mg/dL      BUN 11 mg/dL      Creatinine 0.94 mg/dL      Sodium 134 mmol/L      Potassium 3.3 mmol/L      Chloride 95 mmol/L      CO2 30.0 mmol/L      Calcium 8.6 mg/dL      Total Protein 6.3 g/dL      Albumin 3.9 g/dL      ALT (SGPT) 25 U/L      AST (SGOT) 29 U/L      Alkaline Phosphatase 121 U/L      Total Bilirubin 0.7 mg/dL      Globulin 2.4 gm/dL      A/G Ratio 1.6 g/dL      BUN/Creatinine Ratio 11.7     Anion Gap 9.0 mmol/L      eGFR 70.0 mL/min/1.73     Narrative:      GFR Categories in Chronic Kidney Disease (CKD)      GFR Category          GFR (mL/min/1.73)    Interpretation  G1                     90 or greater         Normal or high (1)  G2                      60-89                Mild decrease (1)  G3a                   45-59                Mild to moderate decrease  G3b                   30-44                Moderate to severe decrease  G4                    15-29                Severe decrease  G5                    14 or less           Kidney failure          (1)In the absence of evidence of kidney disease, neither GFR category G1 or G2 fulfill the criteria for CKD.    eGFR calculation 2021 CKD-EPI creatinine equation, which does not include race as a factor    Magnesium [[id number]]  (Normal) Collected: 03/27/25 0827    Specimen: Blood Updated: 03/27/25 0859     Magnesium 1.9 mg/dL     CBC Auto Differential [[id number]]  (Normal) Collected: 03/27/25 0827    Specimen: Blood Updated: 03/27/25 0840     WBC 5.89 10*3/mm3      RBC 4.43 10*6/mm3      Hemoglobin 13.1 g/dL      Hematocrit 38.9 %      MCV 87.8 fL      MCH 29.6 pg      MCHC 33.7 g/dL      RDW 13.2 %       RDW-SD 42.5 fl      MPV 10.7 fL      Platelets 200 10*3/mm3      Neutrophil % 71.0 %      Lymphocyte % 20.2 %      Monocyte % 6.8 %      Eosinophil % 1.2 %      Basophil % 0.5 %      Immature Grans % 0.3 %      Neutrophils, Absolute 4.18 10*3/mm3      Lymphocytes, Absolute 1.19 10*3/mm3      Monocytes, Absolute 0.40 10*3/mm3      Eosinophils, Absolute 0.07 10*3/mm3      Basophils, Absolute 0.03 10*3/mm3      Immature Grans, Absolute 0.02 10*3/mm3      nRBC 0.0 /100 WBC     TSH [[id number]]  (Abnormal) Collected: 03/27/25 0827    Specimen: Blood Updated: 03/27/25 0905     TSH 4.790 uIU/mL     T4, Free [[id number]]  (Normal) Collected: 03/27/25 0827    Specimen: Blood Updated: 03/27/25 0905     Free T4 1.26 ng/dL             No orders to display       ED Course          MDM  Number of Diagnoses or Management Options  Orthostasis: new and requires workup  Diagnosis management comments: Patient is lab work right now is okay.  Her EKG reveals good pacemaker function.  She is very concerned about the syncopal episode she has.  What she is describing fits very well with orthostasis where she gets a low blood pressure and then passes out.  She has had changes in her medication to try to address this but has not helped as yet.  I did speak with Dr. Sierra of cardiology and he spoke with Dr. Bay's midlevel practitioner who advised that they will try to get the patient quickly to see Dr. Bay this evening as needed to be done.  In the meantime he is advised was to increase her midodrine so we have done that.  She is very frustrated and I think not completely happy with the advice of given her here.  However I do not like there is any easy solution of this or quick solution which is what she is wanting.  He will be a matter of medication changes and further workup is indicated.  She is discharged in stable condition.       Amount and/or Complexity of Data Reviewed  Clinical lab tests: ordered and reviewed  Tests in the  medicine section of CPT®: reviewed and ordered  Discuss the patient with other providers: yes    Risk of Complications, Morbidity, and/or Mortality  Presenting problems: moderate  Diagnostic procedures: moderate  Management options: moderate    Patient Progress  Patient progress: stable        Final diagnoses:   Orthostasis          Moe Estrada Jr., MD  03/27/25 1025

## 2025-03-27 NOTE — PROGRESS NOTES
Kelli Pedro  [id number]  1965  59 y.o.  female    Referring Provider: ELLEN Tidwell DO    Reason for  Visit: Here for routine follow up     Subjective  Went to ER   Called to be seen urgently in office   I came to see her despite being on call   Feels dismissed by health care professionals   BP as below     Prior history as below     Mild chronic exertional shortness of breath on exertion relieved with rest  No significant cough or wheezing  No palpitations  Associated chest pain especially with exertion  But can wake up with chest pain   Chest pain non pleuritic  Chest pain non positional and non gustatory   Easy fatiguability and increasing tired  Feels energy levels running low    No significant pedal edema  No fever or chills  No significant expectoration  No hemoptysis  No presyncope or syncope  Tolerating current medications well with no untoward side effects   Compliant with prescribed medication regimen. Tries to adhere to cardiac diet.   Prior AVR and myocardial bridging s/p release at [place]   S/L NTG helps subside the chest pain within 5 mins of taking   Gets dizzy spells     History of present illness:  Kelli Pedro is a 59 y.o. yo female with Myocardial bridging s/p surgery and AVR, s/p pacer [place] who presents today for   No chief complaint on file.  .    History  Past Medical History:   Diagnosis Date    AAA (abdominal aortic aneurysm)     Abnormal ECG 03/14/2023    Allergic     Anemia     Angina pectoris     Anxiety     Anxiety and depression 06/25/2024    Aortic valve replaced 07/29/2024    Arthritis     Asthma     Bicuspid aortic valve 03/14/2023    Cataract     Surgery 2022    Cholelithiasis     Chronic kidney disease     Clotting disorder     Seeing Leticia Flores at [facility] currently    Colon polyp     Coronary artery disease     Deep vein thrombosis     Dental disease     Diabetes mellitus     Diverticulitis of colon     Diverticulosis     GERD (gastroesophageal reflux  disease)     Hashimoto's thyroiditis     In [facility]    Headache     Heart murmur     Hernia     History of medical problems     Aortic aneurysms    HL (hearing loss)     Hypernatremia     In [facility]    Hypertension     Hyperthyroidism     Hypoglycemia     In [facility]    Hypothyroid     Ingrown toenail     Irritable bowel syndrome     Kidney stone     Previous haf lithotripsy    Low back pain     Lupus     Mitral valve prolapse     In [facility] Claudio [facility]    Mixed hyperlipidemia 11/18/2024    Neuromuscular disorder     NSTEMI (non-ST elevated myocardial infarction) 12/26/2023    Obesity     Osteopenia     Pancreatitis     Peptic ulcer disease     Peptic ulceration     Plantar fasciitis     Past    Pneumonia     RBBB 03/14/2023    Scoliosis     Sinusitis     Sjogren's disease     Sleep apnea     Years ago, but no longer have since Gastric Sleeve Surgery    Tinnitus     Ulcerative colitis     Urinary tract infection     Visual impairment     Vitamin D deficiency     In [facility]. I take Vit D daily   ,   Past Surgical History:   Procedure Laterality Date    ABDOMINAL AORTIC ANEURYSM REPAIR  06/18/2024    ABDOMINAL SURGERY      ANKLE SURGERY      AORTIC VALVE REPAIR/REPLACEMENT  06/2024    AORTIC VALVE SURGERY      ARTERIAL ANEURYSM REPAIR      ASCENDING ARCH/HEMIARCH REPLACEMENT  06/18/2024    BACK SURGERY      BARIATRIC SURGERY      CARDIAC CATHETERIZATION N/A 12/26/2023    Procedure: Left Heart Cath;  Surgeon: Pablito De Leon DO;  Location:  [facility] CATH [facility]ASIVE [facility];  Service: Cardiovascular;  Laterality: N/A;    CARDIAC CATHETERIZATION  12/26/2023    CARDIAC SURGERY  06/2024    CARDIAC VALVE REPLACEMENT  06/2024    CARPAL TUNNEL RELEASE Bilateral     CHOLECYSTECTOMY      COLONOSCOPY  04/24/2023    normal colon consistent with ibs    ENDOSCOPY  04/24/2023    large hitial hernia,chronic gastritis    EYE SURGERY  2022    Cataracts both eyes    FLEXIBLE SIGMOIDOSCOPY      FOOT  SURGERY      GALLBLADDER SURGERY      GASTRIC RESTRICTION SURGERY  2017    HYSTERECTOMY      INSERT / REPLACE / REMOVE PACEMAKER      KIDNEY STONE SURGERY      LITHOTRIPSY      PACEMAKER IMPLANTATION  06/2024    SHOULDER SURGERY      SUBTOTAL HYSTERECTOMY      TOENAIL EXCISION      TRICUSPID VALVE REPLACEMENT      TUBAL ABDOMINAL LIGATION      UPPER GASTROINTESTINAL ENDOSCOPY      VEIN SURGERY      [facility] MyChart   ,   Family History   Problem Relation Age of Onset    Breast cancer Mother 70    Diabetes Mother     Hearing loss Mother     Heart disease Mother     Hyperlipidemia Mother     Vision loss Mother     Clotting disorder Mother     Fainting Mother     Hypertension Mother     Cancer Mother     Migraines Mother     Inflammatory bowel disease Mother     Scleroderma Father     Diabetes Father     Hearing loss Father     Heart disease Father     Hyperlipidemia Father     Vision loss Father     Clotting disorder Father     Fainting Father     Heart attack Father     Hypertension Father     Cancer Father     Stroke Father     Heart valve disorder Father     Colon polyps Father     Inflammatory bowel disease Father     Ulcerative colitis Father     Obesity Father     Diabetes Sister     Hyperlipidemia Sister     Vision loss Sister     Asthma Sister     Hypertension Sister     Thyroid disease Sister     Obesity Sister     Hyperlipidemia Brother     Vision loss Brother     Asthma Brother     Hypertension Brother     Diabetes Brother     Early death Brother     Obesity Brother     Colon cancer Neg Hx    ,   Social History     Tobacco Use    Smoking status: Former     Current packs/day: 0.25     Average packs/day: 0.3 packs/day for 43.8 years (11.0 ttl pk-yrs)     Types: Cigarettes     Start date: 1/1/1991     Quit date: 1/1/1987     Passive exposure: Past    Smokeless tobacco: Never    Tobacco comments:     [id number] for 6 months only   Vaping Use    Vaping status: Never Used   Substance Use Topics     Alcohol use: Not Currently     Comment: Socially very rare    Drug use: Never   ,     Medications  Current Outpatient Medications   Medication Sig Dispense Refill    aspirin 81 MG chewable tablet Chew 1 tablet Daily.      Continuous Glucose  (FreeStyle Aydin 2 [place]) device Use 1 each Continuous.      Continuous Glucose Sensor (FreeStyle Aydin 2 Sensor) misc Use 1 each Every 14 (Fourteen) Days.      eszopiclone (LUNESTA) 3 MG tablet Take 1 tablet by mouth Every Night. Take immediately before bedtime 90 tablet 1    ferrous sulfate 325 (65 FE) MG tablet Take 1 tablet by mouth 3 (Three) Times a Week. 36 tablet 1    fluticasone (FLONASE) 50 MCG/ACT nasal spray 2 sprays into the nostril(s) as directed by provider Daily. 16 g 1    Galcanezumab-gnlm (Emgality) 120 MG/ML solution prefilled syringe Inject 1 mL under the skin into the appropriate area as directed Every 30 (Thirty) Days. 1 mL 5    hydroCHLOROthiazide 25 MG tablet Take 1 tablet by mouth Daily. 90 tablet 1    hydrOXYzine (ATARAX) 25 MG tablet Take 1 tablet by mouth Every 6 (Six) Hours As Needed for Anxiety. 120 tablet 1    lansoprazole (PREVACID) 30 MG capsule Take 1 capsule by mouth Daily. 90 capsule 3    levothyroxine (Synthroid) 75 MCG tablet Take 1/2 tablet on Wednesdays. Skip Sunday dose. Take one tablet daily Amjfxs-Hkedvss-Ijzlvovn-Friday-Saturday. 90 tablet 3    linaclotide (Linzess) 145 MCG capsule capsule Take 1 capsule by mouth Every Morning Before Breakfast. 90 capsule 1    magnesium oxide (MAG-OX) 400 MG tablet Take 1 tablet by mouth Daily. 90 tablet 1    midodrine (PROAMATINE) 2.5 MG tablet Take 1 tablet by mouth 3 (Three) Times a Day Before Meals. 90 tablet 3    mycophenolate (CELLCEPT) 500 MG tablet Take 1 tablet by mouth Daily. 90 tablet 1    Narcan 4 MG/0.1ML nasal spray Administer 1 spray into the nostril(s) as directed by provider As Needed.      nitroglycerin (NITROSTAT) 0.4 MG SL tablet Place 1 tablet under the tongue Every 5  (Five) Minutes As Needed for Chest Pain. Take no more than 3 doses in 15 minutes. 100 tablet 2    ondansetron ODT (ZOFRAN-ODT) 4 MG disintegrating tablet Place 1 tablet on the tongue Every 8 (Eight) Hours As Needed for Nausea or Vomiting. 30 tablet 5    PARoxetine (Paxil) 40 MG tablet Take 1 tablet by mouth Every Morning. 90 tablet 1    pilocarpine (SALAGEN) 5 MG tablet Take 1 tablet by mouth 3 (Three) Times a Day.      potassium chloride (KLOR-CON M10) 10 MEQ CR tablet Take 1 tablet by mouth 2 (Two) Times a Day. 180 tablet 0    potassium chloride 10 MEQ CR tablet Take 1 tablet by mouth Every 12 (Twelve) Hours.      promethazine (PHENERGAN) 25 MG tablet TAKE ONE TABLET BY MOUTH EVERY 6 HOURS AS NEEDED FOR NAUSEA AND VOMITING 30 tablet 1    ranolazine (Ranexa) 1000 MG 12 hr tablet Take 1 tablet by mouth 2 (Two) Times a Day. 60 tablet 11    Semaglutide, 1 MG/DOSE, (OZEMPIC) 4 MG/3ML solution pen-injector Inject 1 mg under the skin into the appropriate area as directed 1 (One) Time Per Week. 3 mL 0    ubrogepant (UBRELVY) 100 MG tablet Take 1 tablet at the onset of headache. May repeat dose x1 in 2 hours if headache is not resolved. Max dose 200 mg/24 hours. 30 tablet 2     No current facility-administered medications for this visit.       Allergies:  Clavulanic acid, Gadolinium, Verapamil hcl er, Zoster vac recomb adjuvanted, Flagyl [metronidazole], Arava [leflunomide], Estradiol, Hydroxychloroquine, Metformin, Prednisone, Protonix [pantoprazole], Tetracyclines & related, and Trazodone    Review of Systems  Review of Systems   Constitutional: Negative.   HENT: Negative.     Eyes: Negative.    Cardiovascular:  Positive for chest pain, dyspnea on exertion and palpitations. Negative for claudication, cyanosis, irregular heartbeat, leg swelling, near-syncope, orthopnea, paroxysmal nocturnal dyspnea and syncope.   Respiratory: Negative.     Endocrine: Negative.    Hematologic/Lymphatic: Negative.    Skin: Negative.   "  Gastrointestinal:  Negative for anorexia.   Genitourinary: Negative.    Neurological: Negative.    Psychiatric/Behavioral: Negative.         Objective     Physical Exam:  Ht 167.6 cm (66\")   Wt 75.8 kg (167 lb)   BMI 26.95 kg/m²     Physical Exam  Constitutional:       Appearance: Normal appearance.   HENT:      Head: Normocephalic.   Eyes:      General: Lids are normal.   Neck:      Vascular: No carotid bruit.   Cardiovascular:      Rate and Rhythm: Regular rhythm.      Heart sounds: S1 normal and S2 normal. Murmur heard.      Systolic murmur is present with a grade of 2/6.   Pulmonary:      Effort: Pulmonary effort is normal.   Abdominal:      Palpations: Abdomen is soft.   Neurological:      Mental Status: She is alert.   Psychiatric:         Speech: Speech normal.         Behavior: Behavior normal.         Thought Content: Thought content normal.         Results Review:    Stress Test with PET Myocardial Perfusion with CT (Single Study)    Accession Number: [id number]   Date of Study: 3/21/25   Ordering Provider: Gavino Bay MD   Clinical Indications: Chest Pain        [facility]  Performing Staff   ECG [place], SPECT [place]: Gavino Bay MD    Tech: Yasmine Layton    Support Staff: Helene Lopez RN             Interpretation Summary         Moderate area of ischemia noted in the left ventricular apical region    Total perfusion defect with stress was 9% and rest was 0.33%    Left ventricular ejection fraction is normal (Calculated EF = 69%).    LAD coronary flow reserve of 4.23, LCx 3.32, RCA 0.60 and total 3.88    Aortic valve replacement noted and pacemaker leads noted       Narrative & Impression   EXAMINATION: CT ANGIOGRAM CHEST-      10/7/2024 10:36 AM     HISTORY: Right-sided chest pain radiating to back.  Please comment on  aorta as well.  History of DVT     In order to have a CT radiation dose as low as reasonably achievable  Automated Exposure Control was utilized for adjustment of the mA " and/or  KV according to patient size.     Total DLP = 391.33 mGy.cm     CT angiography of the chest tube performed after intravenous contrast  enhancement.     Images are acquired in axial plane is subsequent reconstruction in  coronal and sagittal planes.     The comparison is made with the previous study dated 4/1/2024.     An aortic valve prosthesis is in place. This was not noted in the  previous study.     Atheromatous change of the thoracic aorta are seen. No aneurysmal  dilatation or dissection. The maximum lumen of the ascending thoracic  aorta measures 3.1 cm. It measured 4.4 cm at the same level in the  previous study. Sinus of Valsalva measures 3.7 cm in the coronal plane  images.     Normal opacification of the pulmonary arteries and branches bilaterally.  No filling defects in the visualized/opacified pulmonary arterial bed.     Atheromatous changes of the coronary arteries are seen. There is  evidence of prior CABG.     There is no evidence of mediastinal or hilar mass or lymphadenopathy.     There is normal origin course and caliber of the brachiocephalic, left  common carotid and left subclavian arteries.     Limited visualized soft tissue of the neck are unremarkable.  Incompletely visualized thyroid gland appears unremarkable. No nodules.     The lungs are moderately well-expanded.     No areas of focal consolidation or acute infiltrate.     No lung nodules.     Central airway is patent and clear. No endobronchial lesions.     There is a small sliding-type hiatal hernia. There are postsurgical  changes of the stomach.     Limited visualized abdomen appears unremarkable. Images reviewed in bone  window show no acute bony abnormality. There is mild dextroscoliosis of  thoracic spine. A cardiac pacer is seen in place.     IMPRESSION:  1. No acute abnormality of the chest to account for patient's symptoms.  2. Atheromatous disease of the thoracic aorta. No evidence of aneurysmal  dilatation. The aortic  lumen is normal and the previously seen  dilatation of the ascending aorta is not visualized in this study. There  is interval placement of aortic valvular prosthesis.  3. The lungs are unremarkable.          Results for orders placed during the hospital encounter of 03/17/25    Adult Transthoracic Echo Complete W/ Cont if Necessary Per Protocol    Interpretation Summary    Left ventricular systolic function is normal. Calculated left ventricular EF = 68.6% Left ventricular ejection fraction appears to be 66 - 70%.    Left ventricular diastolic function was normal.    Estimated right ventricular systolic pressure from tricuspid regurgitation is normal (<35 mmHg).        ____________________________________________________________________________________________________________________________________________  Health maintenance and recommendations    Low salt/ HTN/ Heart healthy carbohydrate restricted cardiac diet   The patient is advised to reduce or avoid caffeine or other cardiac stimulants.   Minimize or avoid  NSAID-type medications      Monitor for any signs of bleeding including red or dark stools. Fall precautions.   Advised staying uptodate with immunizations per established standard guidelines.    Offered to give patient  a copy of my notes     Questions were encouraged, asked and answered to the patient's  understanding and satisfaction. Questions if any regarding current medications and side effects, need for refills and importance of compliance to medications stressed.    Reviewed available prior notes, consults, prior visits, laboratory findings, radiology and cardiology relevant reports. Updated chart as applicable. I have reviewed the patient's medical history in detail and updated the computerized patient record as relevant.      Updated patient regarding any new or relevant abnormalities on review of records or any new findings on physical exam. Mentioned to patient about purpose of visit and  desirable health short and long term goals and objectives.    Primary to monitor CBC CMP Lipid panel and TSH as applicable    ___________________________________________________________________________________________________________________________________________   Procedures    Assessment & Plan   Diagnoses and all orders for this visit:    1. Chest pain, atypical (Primary)    2. GONZALEZ (dyspnea on exertion)    3. S/P AVR (aortic valve replacement)    4. Primary hypertension    5. Mixed hyperlipidemia            Plan    Will admit for hydration    CC/ hour  Increase Midodrine to 5 mg TID and then 10 mg TID   May need repeat cath if gets exertional chest pain   Moderate ischemia on cardiac PET   Stop HCTZ   Monitor for any signs of bleeding including red or dark stools as well as easy bruisabilty. Fall precautions.   Discussed cardiac PET and echo results   Recommend work for cardiac amyloid     Keep follow up as scheduled or PRN sooner if any new or worsening problem.

## 2025-03-27 NOTE — PLAN OF CARE
Goal Outcome Evaluation:         Pt is A&Ox4 and afebrile. She is on room air and up ad graeme. She will be NPO at midnight for a heart cath in the AM.

## 2025-03-27 NOTE — ED PROVIDER NOTES
Subjective   History of Present Illness  Patient returns with a complaint of needing admission.  She was here earlier today for a syncopal episode which has been a recurrent problem for her secondary to low blood pressure.  She has had some medication changes but still had another episode today so she came in and was checked out.  Her testing at the present time was okay.  I spoke with cardiology again and they advised to follow-up as an outpatient but she was very concerned.  After she was discharged she called Dr. Bay's office and went to see him in his office.  She comes back now saying she was sent from his office here to be admitted and that we should read his notes.    History provided by:  Patient   used: No    Syncope  Episode history:  Single  Most recent episode:  Today  Duration:  2 minutes  Timing:  Constant  Progression:  Resolved  Chronicity:  Recurrent  Context: normal activity and standing up    Context: not blood draw, not bowel movement, not dehydration, not exertion, not inactivity, not medication change, not sight of blood, not sitting down and not urination    Witnessed: yes    Relieved by:  Nothing  Worsened by:  Nothing  Ineffective treatments:  None tried  Associated symptoms: no anxiety, no chest pain, no confusion, no diaphoresis, no difficulty breathing, no dizziness, no fever, no focal sensory loss, no focal weakness, no headaches, no malaise/fatigue, no nausea, no palpitations, no recent fall, no recent injury, no recent surgery, no rectal bleeding, no seizures, no shortness of breath, no visual change, no vomiting and no weakness    Risk factors: no congenital heart disease, no coronary artery disease, no seizures and no vascular disease        Review of Systems   Constitutional: Negative.  Negative for diaphoresis, fever and malaise/fatigue.   HENT: Negative.     Respiratory: Negative.  Negative for shortness of breath.    Cardiovascular:  Positive for syncope.  Negative for chest pain and palpitations.   Gastrointestinal: Negative.  Negative for nausea and vomiting.   Genitourinary: Negative.    Musculoskeletal: Negative.    Skin: Negative.    Neurological:  Negative for dizziness, focal weakness, seizures, weakness and headaches.   Psychiatric/Behavioral:  Negative for confusion.    All other systems reviewed and are negative.      Past Medical History:   Diagnosis Date    AAA (abdominal aortic aneurysm)     Abnormal ECG 03/14/2023    Allergic     Anemia     Angina pectoris     Anxiety     Anxiety and depression 06/25/2024    Aortic valve replaced 07/29/2024    Arthritis     Asthma     Bicuspid aortic valve 03/14/2023    Cataract     Surgery 2022    Cholelithiasis     Chronic kidney disease     Clotting disorder     Seeing Leticia Flores at [facility] currently    Colon polyp     Coronary artery disease     Deep vein thrombosis     Dental disease     Diabetes mellitus     Diverticulitis of colon     Diverticulosis     GERD (gastroesophageal reflux disease)     Hashimoto's thyroiditis     In [facility]    Headache     Heart murmur     Hernia     History of medical problems     Aortic aneurysms    HL (hearing loss)     Hypernatremia     In [facility]    Hypertension     Hyperthyroidism     Hypoglycemia     In [facility]    Hypothyroid     Ingrown toenail     Irritable bowel syndrome     Kidney stone     Previous haf lithotripsy    Low back pain     Lupus     Mitral valve prolapse     In the [facility] Dr. Claudio Gibson    Mixed hyperlipidemia 11/18/2024    Neuromuscular disorder     NSTEMI (non-ST elevated myocardial infarction) 12/26/2023    Obesity     Osteopenia     Pancreatitis     Peptic ulcer disease     Peptic ulceration     Plantar fasciitis     Past    Pneumonia     RBBB 03/14/2023    Scoliosis     Sinusitis     Sjogren's disease     Sleep apnea     Years ago, but no longer have since Gastric Sleeve Surgery    Tinnitus     Ulcerative colitis     Urinary tract  "infection     Visual impairment     Vitamin D deficiency     In MyChart. I take Vit D daily       Allergies   Allergen Reactions    Clavulanic Acid Nausea And Vomiting    Gadolinium Other (See Comments)     \"passed out and was shaking all over. Had to spend the night at hospital\"  NAUSEA/POS SYNCOPE  \"passed out and was shaking all over. Had to spend the night at hospital\"  NAUSEA/POS SYNCOPE  \"passed out and was shaking all over. Had to spend the night at hospital\"  \"passed out and was shaking all over. Had to spend the night at hospital\"  NAUSEA/POS SYNCOPE  NAUSEA/POS SYNCOPE      Verapamil Hcl Er Anaphylaxis    Zoster Vac Recomb Adjuvanted Swelling    Flagyl [Metronidazole] Diarrhea     Abdominal pain     Arava [Leflunomide] Rash     Abnormal labs     Estradiol Rash    Hydroxychloroquine GI Intolerance     Abdominal pain     Metformin Unknown - Low Severity    Prednisone Other (See Comments)     Increase in BP    Protonix [Pantoprazole] Nausea Only and Myalgia    Tetracyclines & Related Rash    Trazodone Itching, Other (See Comments) and Unknown (See Comments)     Blurred vision  Blurred vision  Blurred vision  Blurred vision         Past Surgical History:   Procedure Laterality Date    ABDOMINAL AORTIC ANEURYSM REPAIR  06/18/2024    ABDOMINAL SURGERY      ANKLE SURGERY      AORTIC VALVE REPAIR/REPLACEMENT  06/2024    AORTIC VALVE SURGERY      ARTERIAL ANEURYSM REPAIR      ASCENDING ARCH/HEMIARCH REPLACEMENT  06/18/2024    BACK SURGERY      BARIATRIC SURGERY      CARDIAC CATHETERIZATION N/A 12/26/2023    Procedure: Left Heart Cath;  Surgeon: Pablito De Leon DO;  Location:  [facility];  Service: Cardiovascular;  Laterality: N/A;    CARDIAC CATHETERIZATION  12/26/2023    CARDIAC SURGERY  06/2024    CARDIAC VALVE REPLACEMENT  06/2024    CARPAL TUNNEL RELEASE Bilateral     CHOLECYSTECTOMY      COLONOSCOPY  04/24/2023    normal colon consistent with ibs    ENDOSCOPY  04/24/2023    large " hitial hernia,chronic gastritis    EYE SURGERY  2022    Cataracts both eyes    FLEXIBLE SIGMOIDOSCOPY      FOOT SURGERY      GALLBLADDER SURGERY      GASTRIC RESTRICTION SURGERY  2017    HYSTERECTOMY      INSERT / REPLACE / REMOVE PACEMAKER      KIDNEY STONE SURGERY      LITHOTRIPSY      PACEMAKER IMPLANTATION  06/2024    SHOULDER SURGERY      SUBTOTAL HYSTERECTOMY      TOENAIL EXCISION      TRICUSPID VALVE REPLACEMENT      TUBAL ABDOMINAL LIGATION      UPPER GASTROINTESTINAL ENDOSCOPY      VEIN SURGERY      [facility] MyChart       Family History   Problem Relation Age of Onset    Breast cancer Mother 70    Diabetes Mother     Hearing loss Mother     Heart disease Mother     Hyperlipidemia Mother     Vision loss Mother     Clotting disorder Mother     Fainting Mother     Hypertension Mother     Cancer Mother     Migraines Mother     Inflammatory bowel disease Mother     Scleroderma Father     Diabetes Father     Hearing loss Father     Heart disease Father     Hyperlipidemia Father     Vision loss Father     Clotting disorder Father     Fainting Father     Heart attack Father     Hypertension Father     Cancer Father     Stroke Father     Heart valve disorder Father     Colon polyps Father     Inflammatory bowel disease Father     Ulcerative colitis Father     Obesity Father     Diabetes Sister     Hyperlipidemia Sister     Vision loss Sister     Asthma Sister     Hypertension Sister     Thyroid disease Sister     Obesity Sister     Hyperlipidemia Brother     Vision loss Brother     Asthma Brother     Hypertension Brother     Diabetes Brother     Early death Brother     Obesity Brother     Colon cancer Neg Hx        Social History     Socioeconomic History    Marital status:    Tobacco Use    Smoking status: Former     Current packs/day: 0.25     Average packs/day: 0.3 packs/day for 43.8 years (11.0 ttl pk-yrs)     Types: Cigarettes     Start date: 1/1/1991     Quit date: 1/1/1987     Passive  exposure: Past    Smokeless tobacco: Never    Tobacco comments:     [id number] for 6 months only   Vaping Use    Vaping status: Never Used   Substance and Sexual Activity    Alcohol use: Not Currently     Comment: Socially very rare    Drug use: Never    Sexual activity: Yes     Partners: Male     Birth control/protection: None, Partner of same sex     Comment: Thats one male patner i live with       Prior to Admission medications    Medication Sig Start Date End Date Taking? Authorizing Provider   aspirin 81 MG chewable tablet Chew 1 tablet Daily. 6/18/24 6/29/25  Liss English MD   Continuous Glucose  (FreeStyle Aydin 2 [place]) device Use 1 each Continuous.    Liss English MD   Continuous Glucose Sensor (FreeStyle Aydin 2 Sensor) misc Use 1 each Every 14 (Fourteen) Days.    Liss English MD   eszopiclone (LUNESTA) 3 MG tablet Take 1 tablet by mouth Every Night. Take immediately before bedtime 3/10/25   ELLEN Tidwell,    ferrous sulfate 325 (65 FE) MG tablet Take 1 tablet by mouth 3 (Three) Times a Week. 11/8/24   Leticia Flores APRN   fluticasone (FLONASE) 50 MCG/ACT nasal spray 2 sprays into the nostril(s) as directed by provider Daily. 2/12/24   ELLEN Tidwell DO   Galcanezumab-gnlm (Emgality) 120 MG/ML solution prefilled syringe Inject 1 mL under the skin into the appropriate area as directed Every 30 (Thirty) Days. 1/6/25   ELLEN Tidwell DO   hydroCHLOROthiazide 25 MG tablet Take 1 tablet by mouth Daily. 3/10/25   ELLEN Tidwell DO   hydrOXYzine (ATARAX) 25 MG tablet Take 1 tablet by mouth Every 6 (Six) Hours As Needed for Anxiety. 1/30/25   ELLEN Tidwell DO   lansoprazole (PREVACID) 30 MG capsule Take 1 capsule by mouth Daily. 4/30/24   ELLEN Tidwell DO   levothyroxine (Synthroid) 75 MCG tablet Take 1/2 tablet on Wednesdays. Skip Sunday dose. Take one tablet daily Srecuw-Dtivrgw-Gfxcsshw-Friday-Saturday. 8/22/24    ELLEN Tidwell DO   linaclotide (Linzess) 145 MCG capsule capsule Take 1 capsule by mouth Every Morning Before Breakfast. 3/10/25   ELLEN Tidwell DO   magnesium oxide (MAG-OX) 400 MG tablet Take 1 tablet by mouth Daily. 4/30/24   ELLEN Tidwell DO   midodrine (PROAMATINE) 2.5 MG tablet Take 1 tablet by mouth 3 (Three) Times a Day Before Meals. 3/14/25   Gavino Bay MD   mycophenolate (CELLCEPT) 500 MG tablet Take 1 tablet by mouth Daily. 3/10/25   ELLEN Tidwell DO   Narcan 4 MG/0.1ML nasal spray Administer 1 spray into the nostril(s) as directed by provider As Needed. 7/1/24   Liss English MD   nitroglycerin (NITROSTAT) 0.4 MG SL tablet Place 1 tablet under the tongue Every 5 (Five) Minutes As Needed for Chest Pain. Take no more than 3 doses in 15 minutes. 2/21/25   Sharon Maddox APRN   ondansetron ODT (ZOFRAN-ODT) 4 MG disintegrating tablet Place 1 tablet on the tongue Every 8 (Eight) Hours As Needed for Nausea or Vomiting. 1/13/25   ELLEN Tidwell DO   PARoxetine (Paxil) 40 MG tablet Take 1 tablet by mouth Every Morning. 3/19/25   ELLEN Tidwell DO   pilocarpine (SALAGEN) 5 MG tablet Take 1 tablet by mouth 3 (Three) Times a Day.    Liss English MD   potassium chloride (KLOR-CON M10) 10 MEQ CR tablet Take 1 tablet by mouth 2 (Two) Times a Day. 3/10/25   ELLEN Tidwell DO   potassium chloride 10 MEQ CR tablet Take 1 tablet by mouth Every 12 (Twelve) Hours. 2/12/25   Liss English MD   promethazine (PHENERGAN) 25 MG tablet TAKE ONE TABLET BY MOUTH EVERY 6 HOURS AS NEEDED FOR NAUSEA AND VOMITING 3/14/25   ELLEN Tidwell DO   ranolazine (Ranexa) 1000 MG 12 hr tablet Take 1 tablet by mouth 2 (Two) Times a Day. 3/25/25   Sharon Maddox APRN   Semaglutide, 1 MG/DOSE, (OZEMPIC) 4 MG/3ML solution pen-injector Inject 1 mg under the skin into the appropriate area as directed 1 (One) Time Per Week. 3/10/25   ELLEN Tidwell  DO Rafael   ubrogepant (UBRELVY) 100 MG tablet Take 1 tablet at the onset of headache. May repeat dose x1 in 2 hours if headache is not resolved. Max dose 200 mg/24 hours. 3/19/25   ELLEN Tidwell DO       Medications - No data to display    Vitals:    03/27/25 1327   BP:    Pulse: 65   Resp:    Temp:    SpO2: 98%         Objective   Physical Exam  Vitals and nursing note reviewed.   Constitutional:       Appearance: Normal appearance.   HENT:      Head: Normocephalic and atraumatic.   Cardiovascular:      Rate and Rhythm: Normal rate and regular rhythm.   Pulmonary:      Effort: Pulmonary effort is normal.      Breath sounds: Normal breath sounds.   Abdominal:      General: Abdomen is flat.      Palpations: Abdomen is soft.   Musculoskeletal:         General: Normal range of motion.      Cervical back: Normal range of motion and neck supple.   Skin:     General: Skin is warm and dry.   Neurological:      General: No focal deficit present.      Mental Status: She is alert and oriented to person, place, and time.   Psychiatric:         Mood and Affect: Mood normal.         Behavior: Behavior normal.         Procedures         Lab Results (last 24 hours)       Procedure Component Value Units Date/Time    CBC & Differential [[id number]]  (Normal) Collected: 03/27/25 0827    Specimen: Blood Updated: 03/27/25 0840    Narrative:      The following orders were created for panel order CBC & Differential.  Procedure                               Abnormality         Status                     ---------                               -----------         ------                     CBC Auto Differential[[id number]]        Normal              Final result                 Please view results for these tests on the individual orders.    Comprehensive Metabolic Panel [[id number]]  (Abnormal) Collected: 03/27/25 0827    Specimen: Blood Updated: 03/27/25 0859     Glucose 82 mg/dL      BUN 11 mg/dL      Creatinine 0.94 mg/dL       Sodium 134 mmol/L      Potassium 3.3 mmol/L      Chloride 95 mmol/L      CO2 30.0 mmol/L      Calcium 8.6 mg/dL      Total Protein 6.3 g/dL      Albumin 3.9 g/dL      ALT (SGPT) 25 U/L      AST (SGOT) 29 U/L      Alkaline Phosphatase 121 U/L      Total Bilirubin 0.7 mg/dL      Globulin 2.4 gm/dL      A/G Ratio 1.6 g/dL      BUN/Creatinine Ratio 11.7     Anion Gap 9.0 mmol/L      eGFR 70.0 mL/min/1.73     Narrative:      GFR Categories in Chronic Kidney Disease (CKD)      GFR Category          GFR (mL/min/1.73)    Interpretation  G1                     90 or greater         Normal or high (1)  G2                      60-89                Mild decrease (1)  G3a                   45-59                Mild to moderate decrease  G3b                   30-44                Moderate to severe decrease  G4                    15-29                Severe decrease  G5                    14 or less           Kidney failure          (1)In the absence of evidence of kidney disease, neither GFR category G1 or G2 fulfill the criteria for CKD.    eGFR calculation 2021 CKD-EPI creatinine equation, which does not include race as a factor    Magnesium [[id number]]  (Normal) Collected: 03/27/25 0827    Specimen: Blood Updated: 03/27/25 0859     Magnesium 1.9 mg/dL     CBC Auto Differential [[id number]]  (Normal) Collected: 03/27/25 0827    Specimen: Blood Updated: 03/27/25 0840     WBC 5.89 10*3/mm3      RBC 4.43 10*6/mm3      Hemoglobin 13.1 g/dL      Hematocrit 38.9 %      MCV 87.8 fL      MCH 29.6 pg      MCHC 33.7 g/dL      RDW 13.2 %      RDW-SD 42.5 fl      MPV 10.7 fL      Platelets 200 10*3/mm3      Neutrophil % 71.0 %      Lymphocyte % 20.2 %      Monocyte % 6.8 %      Eosinophil % 1.2 %      Basophil % 0.5 %      Immature Grans % 0.3 %      Neutrophils, Absolute 4.18 10*3/mm3      Lymphocytes, Absolute 1.19 10*3/mm3      Monocytes, Absolute 0.40 10*3/mm3      Eosinophils, Absolute 0.07 10*3/mm3      Basophils, Absolute 0.03  10*3/mm3      Immature Grans, Absolute 0.02 10*3/mm3      nRBC 0.0 /100 WBC     TSH [[id number]]  (Abnormal) Collected: 03/27/25 0827    Specimen: Blood Updated: 03/27/25 0905     TSH 4.790 uIU/mL     T4, Free [[id number]]  (Normal) Collected: 03/27/25 0827    Specimen: Blood Updated: 03/27/25 0905     Free T4 1.26 ng/dL             No orders to display       ED Course          MDM  Number of Diagnoses or Management Options  Atypical chest pain: new and requires workup  Syncope and collapse: new and requires workup  Diagnosis management comments: I spoke with Dr. Sierra of cardiology.  He agreed to admit.       Amount and/or Complexity of Data Reviewed  Discuss the patient with other providers: yes    Risk of Complications, Morbidity, and/or Mortality  Presenting problems: moderate  Diagnostic procedures: minimal  Management options: moderate    Patient Progress  Patient progress: stable        Final diagnoses:   Syncope and collapse   Atypical chest pain          Moe Estrada Jr., MD  03/27/25 9938

## 2025-03-27 NOTE — H&P
Chief Complaint   Patient presents with    Syncope       Subjective .     History of present illness:  Kelli Pedro is a 59 y.o. yo female with history of calcified bicuspid aortic valve with dilated ascending aorta who underwent aortic valve replacement with a 25 mm Inspiris valve, hemiarch replacement with a 28 mm Dacron graft and resection of an LAD bridge by Dr. Wolf at [facility] in [place], [place] on 6/18/2024 who presents today for evaluation and treatment of syncope and presyncope.  It is noted that the patient had placement of a Medtronic dual-chamber pacemaker shortly after her heart surgery due to persistent complete heart block.  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  At that time her blood pressure was documented to be 77 mmHg systolic.  She also tells me that she had a edelmira syncopal episode this past weekend whereby she suddenly fell to the floor not realizing what was going on and awakened after she had hit the floor.  As it turns out, she went to our office after the initial ER visit and saw Dr. Bay who sent her back to the ER for admission and volume replacement.  The patient had already received a liter of normal saline in the ER.  She was on 2.5 mg of midodrine 3 times daily and this had been increased to 5 mg 3 times daily.  The patient is now admitted to the cardiology service.    The patient has continued to have intermittent episodes of chest discomfort with and without exertion, despite having her LAD bridge resected when she had her open heart surgery.  She has been treated with increasing doses of ranolazine.  She was previously on propranolol which has been discontinued.  She was also taking nitroglycerin but has been told to stop taking this due to her hypotension.  The patient also has exertional dyspnea and some palpitations at times.  A  recent transthoracic echocardiogram on 3/24/2025 revealed normal left ventricular systolic function and a functioning bioprosthetic valve with gradients within defined limits and no significant paravalvular leak.  There were no issues noted with her ascending aortic graft.  A myocardial perfusion PET stress test was interpreted as showing a moderate area of ischemia in the left ventricular apical region.  This was felt possibly due to artifact or her previously documented LAD bridge (though it is said to have been resected).  Repeat cardiac catheterization was contemplated, particularly if she continued to have chest discomfort symptoms.    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  She is a .  She does have a significant other.  She has a history of hypertension.  She has a history of Hashimoto's thyroiditis and lupus and is on CellCept.  She is a former cigarette smoker but only smoked for 6 months and quit in 1991.        Chief Complaint   Patient presents with    Syncope   .    History  Past Medical History:   Diagnosis Date    AAA (abdominal aortic aneurysm)     Abnormal ECG 03/14/2023    Allergic     Anemia     Angina pectoris     Anxiety     Anxiety and depression 06/25/2024    Aortic valve replaced 07/29/2024    Arthritis     Asthma     Bicuspid aortic valve 03/14/2023    Cataract     Surgery 2022    Cholelithiasis     Chronic kidney disease     Clotting disorder     Seeing Leticia Flores at [facility] currently    Colon polyp     Coronary artery disease     Deep vein thrombosis     Dental disease     Diabetes mellitus     Diverticulitis of colon     Diverticulosis     GERD (gastroesophageal reflux disease)     Hashimoto's thyroiditis     In [facility]    Headache     Heart murmur     Hernia     History of medical problems     Aortic aneurysms    HL (hearing loss)     Hypernatremia     In [facility]    Hypertension     Hyperthyroidism     Hypoglycemia     In [facility]    Hypothyroid      Ingrown toenail     Irritable bowel syndrome     Kidney stone     Previous haf lithotripsy    Low back pain     Lupus     Mitral valve prolapse     [facility] Claudio Gibson    Mixed hyperlipidemia 11/18/2024    Neuromuscular disorder     NSTEMI (non-ST elevated myocardial infarction) 12/26/2023    Obesity     Osteopenia     Pancreatitis     Peptic ulcer disease     Peptic ulceration     Plantar fasciitis     Past    Pneumonia     RBBB 03/14/2023    Scoliosis     Sinusitis     Sjogren's disease     Sleep apnea     Years ago, but no longer have since Gastric Sleeve Surgery    Tinnitus     Ulcerative colitis     Urinary tract infection     Visual impairment     Vitamin D deficiency     [facility] I take Vit D daily   ,   Past Surgical History:   Procedure Laterality Date    ABDOMINAL AORTIC ANEURYSM REPAIR  06/18/2024    ABDOMINAL SURGERY      ANKLE SURGERY      AORTIC VALVE REPAIR/REPLACEMENT  06/2024    AORTIC VALVE SURGERY      ARTERIAL ANEURYSM REPAIR      ASCENDING ARCH/HEMIARCH REPLACEMENT  06/18/2024    BACK SURGERY      BARIATRIC SURGERY      CARDIAC CATHETERIZATION N/A 12/26/2023    Procedure: Left Heart Cath;  Surgeon: Pablito De Leon DO;  Location:  [facility] CATH INVASIVE LOCATION;  Service: Cardiovascular;  Laterality: N/A;    CARDIAC CATHETERIZATION  12/26/2023    CARDIAC SURGERY  06/2024    CARDIAC VALVE REPLACEMENT  06/2024    CARPAL TUNNEL RELEASE Bilateral     CHOLECYSTECTOMY      COLONOSCOPY  04/24/2023    normal colon consistent with ibs    ENDOSCOPY  04/24/2023    large hitial hernia,chronic gastritis    EYE SURGERY  2022    Cataracts both eyes    FLEXIBLE SIGMOIDOSCOPY      FOOT SURGERY      GALLBLADDER SURGERY      GASTRIC RESTRICTION SURGERY  2017    HYSTERECTOMY      INSERT / REPLACE / REMOVE PACEMAKER      KIDNEY STONE SURGERY      LITHOTRIPSY      PACEMAKER IMPLANTATION  06/2024    SHOULDER SURGERY      SUBTOTAL HYSTERECTOMY      TOENAIL EXCISION       TRICUSPID VALVE REPLACEMENT      TUBAL ABDOMINAL LIGATION      UPPER GASTROINTESTINAL ENDOSCOPY      VEIN SURGERY      [facility] MyChart   ,   Family History   Problem Relation Age of Onset    Breast cancer Mother 70    Diabetes Mother     Hearing loss Mother     Heart disease Mother     Hyperlipidemia Mother     Vision loss Mother     Clotting disorder Mother     Fainting Mother     Hypertension Mother     Cancer Mother     Migraines Mother     Inflammatory bowel disease Mother     Scleroderma Father     Diabetes Father     Hearing loss Father     Heart disease Father     Hyperlipidemia Father     Vision loss Father     Clotting disorder Father     Fainting Father     Heart attack Father     Hypertension Father     Cancer Father     Stroke Father     Heart valve disorder Father     Colon polyps Father     Inflammatory bowel disease Father     Ulcerative colitis Father     Obesity Father     Diabetes Sister     Hyperlipidemia Sister     Vision loss Sister     Asthma Sister     Hypertension Sister     Thyroid disease Sister     Obesity Sister     Hyperlipidemia Brother     Vision loss Brother     Asthma Brother     Hypertension Brother     Diabetes Brother     Early death Brother     Obesity Brother     Colon cancer Neg Hx    ,   Social History     Tobacco Use    Smoking status: Former     Current packs/day: 0.25     Average packs/day: 0.3 packs/day for 43.8 years (11.0 ttl pk-yrs)     Types: Cigarettes     Start date: 1/1/1991     Quit date: 1/1/1987     Passive exposure: Past    Smokeless tobacco: Never    Tobacco comments:     [id number] for 6 months only   Vaping Use    Vaping status: Never Used   Substance Use Topics    Alcohol use: Not Currently     Comment: Socially very rare    Drug use: Never   ,     Medications  No current facility-administered medications for this encounter.     Current Outpatient Medications   Medication Sig Dispense Refill    aspirin 81 MG chewable tablet Chew 1 tablet Daily.       Continuous Glucose  (FreeStyle Aydin 2 [place]) device Use 1 each Continuous.      Continuous Glucose Sensor (FreeStyle Aydin 2 Sensor) misc Use 1 each Every 14 (Fourteen) Days.      eszopiclone (LUNESTA) 3 MG tablet Take 1 tablet by mouth Every Night. Take immediately before bedtime 90 tablet 1    ferrous sulfate 325 (65 FE) MG tablet Take 1 tablet by mouth 3 (Three) Times a Week. 36 tablet 1    fluticasone (FLONASE) 50 MCG/ACT nasal spray 2 sprays into the nostril(s) as directed by provider Daily. 16 g 1    Galcanezumab-gnlm (Emgality) 120 MG/ML solution prefilled syringe Inject 1 mL under the skin into the appropriate area as directed Every 30 (Thirty) Days. 1 mL 5    hydroCHLOROthiazide 25 MG tablet Take 1 tablet by mouth Daily. 90 tablet 1    hydrOXYzine (ATARAX) 25 MG tablet Take 1 tablet by mouth Every 6 (Six) Hours As Needed for Anxiety. 120 tablet 1    lansoprazole (PREVACID) 30 MG capsule Take 1 capsule by mouth Daily. 90 capsule 3    levothyroxine (Synthroid) 75 MCG tablet Take 1/2 tablet on Wednesdays. Skip Sunday dose. Take one tablet daily Zeytlv-Fjkttyf-Gupuibio-Friday-Saturday. 90 tablet 3    linaclotide (Linzess) 145 MCG capsule capsule Take 1 capsule by mouth Every Morning Before Breakfast. 90 capsule 1    magnesium oxide (MAG-OX) 400 MG tablet Take 1 tablet by mouth Daily. 90 tablet 1    midodrine (PROAMATINE) 2.5 MG tablet Take 1 tablet by mouth 3 (Three) Times a Day Before Meals. 90 tablet 3    mycophenolate (CELLCEPT) 500 MG tablet Take 1 tablet by mouth Daily. 90 tablet 1    Narcan 4 MG/0.1ML nasal spray Administer 1 spray into the nostril(s) as directed by provider As Needed.      nitroglycerin (NITROSTAT) 0.4 MG SL tablet Place 1 tablet under the tongue Every 5 (Five) Minutes As Needed for Chest Pain. Take no more than 3 doses in 15 minutes. 100 tablet 2    ondansetron ODT (ZOFRAN-ODT) 4 MG disintegrating tablet Place 1 tablet on the tongue Every 8 (Eight) Hours As Needed for  "Nausea or Vomiting. 30 tablet 5    PARoxetine (Paxil) 40 MG tablet Take 1 tablet by mouth Every Morning. 90 tablet 1    pilocarpine (SALAGEN) 5 MG tablet Take 1 tablet by mouth 3 (Three) Times a Day.      potassium chloride (KLOR-CON M10) 10 MEQ CR tablet Take 1 tablet by mouth 2 (Two) Times a Day. 180 tablet 0    potassium chloride 10 MEQ CR tablet Take 1 tablet by mouth Every 12 (Twelve) Hours.      promethazine (PHENERGAN) 25 MG tablet TAKE ONE TABLET BY MOUTH EVERY 6 HOURS AS NEEDED FOR NAUSEA AND VOMITING 30 tablet 1    ranolazine (Ranexa) 1000 MG 12 hr tablet Take 1 tablet by mouth 2 (Two) Times a Day. 60 tablet 11    Semaglutide, 1 MG/DOSE, (OZEMPIC) 4 MG/3ML solution pen-injector Inject 1 mg under the skin into the appropriate area as directed 1 (One) Time Per Week. 3 mL 0    ubrogepant (UBRELVY) 100 MG tablet Take 1 tablet at the onset of headache. May repeat dose x1 in 2 hours if headache is not resolved. Max dose 200 mg/24 hours. 30 tablet 2       Allergies:  Clavulanic acid, Gadolinium, Verapamil hcl er, Zoster vac recomb adjuvanted, Flagyl [metronidazole], Arava [leflunomide], Estradiol, Hydroxychloroquine, Metformin, Prednisone, Protonix [pantoprazole], Tetracyclines & related, and Trazodone    Review of Systems  ROS review of systems is essentially as in HPI without any significant additions.  It is noted that she previously had significant lower extremity edema but this has improved after having had vein surgery.    Objective     Physical Exam:  Patient Vitals for the past 24 hrs:   BP Temp Temp src Pulse Resp SpO2 Height Weight   03/27/25 1327 -- -- -- 65 -- 98 % -- --   03/27/25 1322 -- -- -- 64 -- 98 % -- --   03/27/25 1302 102/67 -- -- 69 -- 94 % -- --   03/27/25 1201 116/59 -- -- 70 -- 98 % -- --   03/27/25 1145 118/73 97.9 °F (36.6 °C) Oral 84 18 97 % 167.6 cm (66\") 73.3 kg (161 lb 8 oz)     Physical Exam    This is a 59-year-old female who is awake, alert and oriented x 3.  She is in no " "distress.  HEENT: No scleral icterus.  She is normocephalic.  Neck: No jugular venous distention or carotid bruits.  No noted thyromegaly.  Lungs: Clear to auscultation.  Heart: Regular rhythm without audible murmur or gallop sound.  S1 and S2 normal.  Abdomen: No tenderness or masses.  No organomegaly.  Extremities: Trace pretibial edema.  No cyanosis.  Pedal pulses intact.  Neurologic: No obvious focal abnormalities noted.    Results Review:   I reviewed the patient's new clinical results.  Lab Results (last 24 hours)       ** No results found for the last 24 hours. **          Imaging Results (Last 24 Hours)       ** No results found for the last 24 hours. **          No results found for: \"ECHOEFEST\"      Assessment & Plan   Problem(s):    Patient Active Problem List   Diagnosis    Hashimoto's disease    Vitamin D deficiency    Overweight (BMI 25.0-29.9)    Breast calcifications    Pedal edema    Venous insufficiency    GONZALEZ (dyspnea on exertion)    Bicuspid aortic valve    Family history of early CAD    Abnormal ECG    RBBB    Precordial pain    Thoracic aortic aneurysm without rupture    Status post laparoscopic sleeve gastrectomy    Hypertension    Abnormal nuclear stress test    Stable angina    Chest pain, atypical    Coronary artery vasospasm    Myocardial bridge    Other insomnia    Anxiety and depression    Neuropathy    Lumbar facet arthropathy    Vitamin B 12 deficiency    Prediabetes    Positive STEVE (antinuclear antibody)    Polyarthralgia    H/O heart surgery    Presence of cardiac pacemaker    Iron deficiency anemia    Pleurisy    Right-sided chest pain    S/P AVR (aortic valve replacement)    Mixed hyperlipidemia    CHB (complete heart block)    Syncope     1.  Symptomatic orthostasis.  She received fluid in the ER earlier and her midodrine was increased and she was allowed to be discharged from the ER.  She presented to the cardiology office and saw Dr. Bay, her primary cardiologist.  He felt that " she required additional volume and probably additional workup.  She has now been admitted for intravenous fluids and monitoring.  Because of her ongoing symptoms of chest discomfort and abnormal myocardial perfusion study, cardiac catheterization is considered.  This is probably going to be scheduled tomorrow for Dr. Cody Rtuherford.  The patient has been informed of this and agrees to undergo this procedure if it is deemed appropriate.    2.  Chest pain with abnormal myocardial perfusion study.  Probable cardiac catheterization by Dr. Cody Rutherford tomorrow.  She will be well-hydrated prior to that study.    3.  Status post bioprosthetic aortic valve replacement and aortic hemiarch replacement.  No issues noted on recent transthoracic echocardiogram.    4.  Status post placement of a dual-chamber pacemaker.  This appears to be functioning appropriately it will be interrogated while she is here.    5.  History of hypertension.  Will monitor blood pressures and treat accordingly, likely avoiding diuretics as much as possible.        Jared Sierra MD  3/27/2025  14:26 CDT

## 2025-03-28 ENCOUNTER — READMISSION MANAGEMENT (OUTPATIENT)
Dept: CALL CENTER | Facility: HOSPITAL | Age: 60
End: 2025-03-28
Payer: COMMERCIAL

## 2025-03-28 VITALS
RESPIRATION RATE: 16 BRPM | DIASTOLIC BLOOD PRESSURE: 63 MMHG | HEIGHT: 66 IN | SYSTOLIC BLOOD PRESSURE: 103 MMHG | TEMPERATURE: 98.1 F | HEART RATE: 64 BPM | WEIGHT: 161.5 LBS | BODY MASS INDEX: 25.96 KG/M2 | OXYGEN SATURATION: 96 %

## 2025-03-28 PROBLEM — R07.9 CHEST PAIN: Status: ACTIVE | Noted: 2025-03-27

## 2025-03-28 LAB
ANION GAP SERPL CALCULATED.3IONS-SCNC: 8 MMOL/L (ref 5–15)
BASOPHILS # BLD AUTO: 0.04 10*3/MM3 (ref 0–0.2)
BASOPHILS NFR BLD AUTO: 0.7 % (ref 0–1.5)
BUN SERPL-MCNC: 12 MG/DL (ref 6–20)
BUN/CREAT SERPL: 14.3 (ref 7–25)
CALCIUM SPEC-SCNC: 8.6 MG/DL (ref 8.6–10.5)
CHLORIDE SERPL-SCNC: 104 MMOL/L (ref 98–107)
CO2 SERPL-SCNC: 26 MMOL/L (ref 22–29)
CREAT SERPL-MCNC: 0.84 MG/DL (ref 0.57–1)
DEPRECATED RDW RBC AUTO: 43.1 FL (ref 37–54)
EGFRCR SERPLBLD CKD-EPI 2021: 80.2 ML/MIN/1.73
EOSINOPHIL # BLD AUTO: 0.14 10*3/MM3 (ref 0–0.4)
EOSINOPHIL NFR BLD AUTO: 2.6 % (ref 0.3–6.2)
ERYTHROCYTE [DISTWIDTH] IN BLOOD BY AUTOMATED COUNT: 13.2 % (ref 12.3–15.4)
GLUCOSE SERPL-MCNC: 76 MG/DL (ref 65–99)
HCT VFR BLD AUTO: 37 % (ref 34–46.6)
HGB BLD-MCNC: 12.4 G/DL (ref 12–15.9)
IMM GRANULOCYTES # BLD AUTO: 0.01 10*3/MM3 (ref 0–0.05)
IMM GRANULOCYTES NFR BLD AUTO: 0.2 % (ref 0–0.5)
LYMPHOCYTES # BLD AUTO: 1.62 10*3/MM3 (ref 0.7–3.1)
LYMPHOCYTES NFR BLD AUTO: 30.2 % (ref 19.6–45.3)
MCH RBC QN AUTO: 29.7 PG (ref 26.6–33)
MCHC RBC AUTO-ENTMCNC: 33.5 G/DL (ref 31.5–35.7)
MCV RBC AUTO: 88.7 FL (ref 79–97)
MONOCYTES # BLD AUTO: 0.41 10*3/MM3 (ref 0.1–0.9)
MONOCYTES NFR BLD AUTO: 7.6 % (ref 5–12)
NEUTROPHILS NFR BLD AUTO: 3.15 10*3/MM3 (ref 1.7–7)
NEUTROPHILS NFR BLD AUTO: 58.7 % (ref 42.7–76)
NRBC BLD AUTO-RTO: 0 /100 WBC (ref 0–0.2)
PLATELET # BLD AUTO: 177 10*3/MM3 (ref 140–450)
PMV BLD AUTO: 11.1 FL (ref 6–12)
POTASSIUM SERPL-SCNC: 3.9 MMOL/L (ref 3.5–5.2)
POTASSIUM SERPL-SCNC: 4 MMOL/L (ref 3.5–5.2)
QT INTERVAL: 526 MS
QTC INTERVAL: 551 MS
RBC # BLD AUTO: 4.17 10*6/MM3 (ref 3.77–5.28)
SODIUM SERPL-SCNC: 138 MMOL/L (ref 136–145)
WBC NRBC COR # BLD AUTO: 5.37 10*3/MM3 (ref 3.4–10.8)

## 2025-03-28 PROCEDURE — 84132 ASSAY OF SERUM POTASSIUM: CPT | Performed by: INTERNAL MEDICINE

## 2025-03-28 PROCEDURE — 63710000001 PROMETHAZINE PER 25 MG: Performed by: INTERNAL MEDICINE

## 2025-03-28 PROCEDURE — 96361 HYDRATE IV INFUSION ADD-ON: CPT

## 2025-03-28 PROCEDURE — 99239 HOSP IP/OBS DSCHRG MGMT >30: CPT | Performed by: NURSE PRACTITIONER

## 2025-03-28 PROCEDURE — 85025 COMPLETE CBC W/AUTO DIFF WBC: CPT | Performed by: NURSE PRACTITIONER

## 2025-03-28 PROCEDURE — G0378 HOSPITAL OBSERVATION PER HR: HCPCS

## 2025-03-28 PROCEDURE — 80048 BASIC METABOLIC PNL TOTAL CA: CPT | Performed by: NURSE PRACTITIONER

## 2025-03-28 PROCEDURE — 63710000001 ONDANSETRON ODT 4 MG TABLET DISPERSIBLE: Performed by: INTERNAL MEDICINE

## 2025-03-28 PROCEDURE — 25810000003 SODIUM CHLORIDE 0.9 % SOLUTION: Performed by: NURSE PRACTITIONER

## 2025-03-28 RX ORDER — MIDODRINE HYDROCHLORIDE 5 MG/1
5 TABLET ORAL
Qty: 90 TABLET | Refills: 5 | Status: SHIPPED | OUTPATIENT
Start: 2025-03-28

## 2025-03-28 RX ADMIN — ONDANSETRON 4 MG: 4 TABLET, ORALLY DISINTEGRATING ORAL at 03:28

## 2025-03-28 RX ADMIN — SODIUM CHLORIDE 100 ML/HR: 9 INJECTION, SOLUTION INTRAVENOUS at 03:25

## 2025-03-28 RX ADMIN — MIDODRINE HYDROCHLORIDE 5 MG: 2.5 TABLET ORAL at 08:37

## 2025-03-28 RX ADMIN — RANOLAZINE 500 MG: 500 TABLET, FILM COATED, EXTENDED RELEASE ORAL at 08:37

## 2025-03-28 RX ADMIN — PROMETHAZINE HYDROCHLORIDE 25 MG: 25 TABLET ORAL at 05:23

## 2025-03-28 NOTE — NURSING NOTE
"Patient was upset during med pass because we did not have the exact medications she has at home. Refused most of her HS meds. When offered an alternative with pharmacy, patient refused and denied taking anything that we do not currently have right now. Patient stated that she would refuse any meds that was not given to her by 2100. Patient told Aide that \"If we didn't hurry up and give her medications she would not take them\". Charge nurse notified and multiple attempts made to assist the patient in feeling satisfied was attempted without success. Patient refused parts of the initial assessment. Attempted Pulse and circulation check, patient told me to stop and that was not needed. When I explained the reasoning why we needed to perform a pulse check on lower extremities for her heart cath tomorrow, she stated a refusal form was signed on day shift and in her chart,  that they will be using her wrist and she was done being assessed at this time and told us to leave the room. Patient refused multiple treatments and are listed as follows.   Npo after midnight.- Patient stated she is getting a heart cath and only needs to be NPO 6 hrs prior to the procedure. And when she find out the time then she will be NPO. When explained  that the doctors order were after midnight patient stated \"I don't care\" then she stated if I have to be NPO after midnight I will not be taking all of my morning medications.   Patient later refused the pacemaker interrogation and stated \"I already had one done recently, it works and I do not need to get it done again.\"  Patient also refused the bed alarm rlt he dx of syncope episodes  and stated \" that's a restraint, and by law we are not allowed to confine her to the bed\"   Patient then stated she does not want to be woken up any more for treatment or care and would like us to leave her alone. Charge nurse notified.  "

## 2025-03-28 NOTE — OUTREACH NOTE
Prep Survey      Flowsheet Row Responses   [facility] patient discharged from? [place]   Is LACE score < 7 ? Yes   Eligibility [facility]   Date of Admission 03/27/25   Date of Discharge 03/28/25   Discharge diagnosis Syncope   Does the patient have one of the following disease processes/diagnoses(primary or secondary)? Other   Prep survey completed? Yes            Marjorie SANTILLAN - Registered Nurse

## 2025-03-28 NOTE — DISCHARGE SUMMARY
[facility] HEART GROUP DISCHARGE    Date of Discharge:  3/28/2025    Discharge Diagnosis:   Syncope  Near Syncope  Hypotension  Fatigue    Presenting Problem/History of Present Illness  Syncope [R55]    Hospital Course  Patient is a 59 y.o. female presented to the cardiology office yesterday, 3/27/2025, and had an outpatient visit with Dr. Bay, her primary cardiologist.  He recommended that the patient be admitted to the hospital for IV hydration as well as medication adjustments to her midodrine as she had reported symptoms consistent with symptomatic hypotension.  When the patient was asked today about her chief complaints upon her evaluation with Dr. Bay yesterday she indicates diaphoresis, fatigue, syncope, near syncope, and easy fatigability with minimal activities.    She has been hospitalized overnight receiving IV fluids.  Her midodrine dose was increased from 2.5 mg to 5 mg 3 times daily.  She has had improvement of her blood pressure with her last 3 readings being greater than 100 systolic.    She had previously reported chest pain and shortness of breath and had outpatient testing by her primary cardiologist.  Records indicate that the patient may need further follow-up with further ischemic testing however the plan for inpatient hospitalization was hydration and increased midodrine doses to try and help chief complaint symptoms of syncope, near syncope, symptomatic hypotension.  She has improvement of her blood pressure during her hospitalization.    She was frustrated this morning as she had been n.p.o. after midnight with the expectation to have cardiac catheterization this a.m.  We discussed the plan of care was for hydration and improvement of her blood pressure with titration of outpatient medications.  In the event that the patient continued to have exertional chest pain based off some recent outpatient cardiac studies she may need further investigation with ischemic testing in the  future however this was not something that is being planned for her today.  She was frustrated and called her  for clarification as she felt that she was told differently.  Her  seemed to understand that the office visit with Dr. Bay indicated that she initially would have titration of her medications and fluid resuscitation.  She then became frustrated with him and hung up on him.  She requested to be discharged.  We did discuss that an increased dose of her midodrine would be sent to her outpatient pharmacy as this is seemed to improve her blood pressures during her hospitalization.  Will have her follow-up with her primary cardiologist to discuss further outpatient testing.  She is being discharged home in stable condition.    Procedures Performed    Consults:   None    Pertinent Test Results:   Results for orders placed during the hospital encounter of 03/17/25    Adult Transthoracic Echo Complete W/ Cont if Necessary Per Protocol    Interpretation Summary    Left ventricular systolic function is normal. Calculated left ventricular EF = 68.6% Left ventricular ejection fraction appears to be 66 - 70%.    Left ventricular diastolic function was normal.    Estimated right ventricular systolic pressure from tricuspid regurgitation is normal (<35 mmHg).    Lab Results   Component Value Date    GLUCOSE 76 03/28/2025    CALCIUM 8.6 03/28/2025     03/28/2025    K 4.0 03/28/2025    CO2 26.0 03/28/2025     03/28/2025    BUN 12 03/28/2025    CREATININE 0.84 03/28/2025    EGFR 80.2 03/28/2025    BCR 14.3 03/28/2025    ANIONGAP 8.0 03/28/2025         Condition on Discharge: Stable    Physical Exam at Discharge  General: Alert and oriented, no acute distress, irritable and frustrated  Card: RRR  Resp: CTAB  Extremities: well perfused and warm      Vital Signs  Temp:  [97.9 °F (36.6 °C)-98.1 °F (36.7 °C)] 98.1 °F (36.7 °C)  Heart Rate:  [60-84] 64  Resp:  [15-18] 16  BP: ()/(53-73)  103/63    Discharge Disposition  Home or Self Care    Discharge Medications     Discharge Medications        Changes to Medications        Instructions Start Date   midodrine 5 MG tablet  Commonly known as: PROAMATINE  What changed:   medication strength  how much to take   5 mg, Oral, 3 Times Daily Before Meals             Continue These Medications        Instructions Start Date   aspirin 81 MG chewable tablet   81 mg, Oral, Daily      Emgality 120 MG/ML solution prefilled syringe  Generic drug: Galcanezumab-gnlm   120 mg, Subcutaneous, Every 30 Days      eszopiclone 3 MG tablet  Commonly known as: LUNESTA   3 mg, Oral, Nightly, Take immediately before bedtime      ferrous sulfate 325 (65 FE) MG tablet   325 mg, Oral, 3 Times Weekly      fluticasone 50 MCG/ACT nasal spray  Commonly known as: FLONASE   2 sprays, Nasal, Daily      hydrOXYzine 25 MG tablet  Commonly known as: ATARAX   25 mg, Oral, Every 6 Hours PRN      lansoprazole 30 MG capsule  Commonly known as: PREVACID   30 mg, Oral, Daily      levothyroxine 75 MCG tablet  Commonly known as: SYNTHROID, LEVOTHROID   75 mcg, Oral, 5 Times Weekly, Monday, Tuesday, Thursday, Friday and Saturday      levothyroxine 75 MCG tablet  Commonly known as: SYNTHROID, LEVOTHROID   37.5 mcg, Oral, Weekly, Wednesday      linaclotide 145 MCG capsule capsule  Commonly known as: Linzess   145 mcg, Oral, Every Morning Before Breakfast      magnesium oxide 400 MG tablet  Commonly known as: MAG-OX   400 mg, Oral, Daily      mycophenolate 500 MG tablet  Commonly known as: CELLCEPT   500 mg, Oral, Daily      Narcan 4 MG/0.1ML nasal spray  Generic drug: naloxone   1 spray, Nasal, As Needed      nitroglycerin 0.4 MG SL tablet  Commonly known as: NITROSTAT   0.4 mg, Sublingual, Every 5 Minutes PRN, Take no more than 3 doses in 15 minutes.      ondansetron ODT 4 MG disintegrating tablet  Commonly known as: ZOFRAN-ODT   4 mg, Translingual, Every 8 Hours PRN      PARoxetine 40 MG  tablet  Commonly known as: Paxil   40 mg, Oral, Every Morning      pilocarpine 5 MG tablet  Commonly known as: SALAGEN   5 mg, Oral, 3 Times Daily      potassium chloride 10 MEQ CR tablet  Commonly known as: KLOR-CON M10   10 mEq, Oral, 2 Times Daily      promethazine 25 MG tablet  Commonly known as: PHENERGAN   25 mg, Every 6 Hours PRN      ranolazine 1000 MG 12 hr tablet  Commonly known as: Ranexa   1,000 mg, Oral, 2 Times Daily      Semaglutide (1 MG/DOSE) 4 MG/3ML solution pen-injector  Commonly known as: OZEMPIC   1 mg, Subcutaneous, Weekly      ubrogepant 100 MG tablet  Commonly known as: UBRELVY   Take 1 tablet at the onset of headache. May repeat dose x1 in 2 hours if headache is not resolved. Max dose 200 mg/24 hours.             Stop These Medications      hydroCHLOROthiazide 25 MG tablet              Discharge Diet:   Regular Diet    Activity at Discharge:   Gradually resume normal activity    Follow-up Appointments  Future Appointments   Date Time Provider Department Center   4/1/2025  9:00 AM PAD US 1 BH PAD US PAD   4/18/2025 10:15 AM Dorothy Smith APRN [facility] U PAD PAD   4/30/2025  8:30 AM Sharon Maddox APRN [facility] CD PAD PAD   6/30/2025  9:45 AM ELLEN Tidwell DO [facility] PC VSQ PAD   7/28/2025  8:00 AM Ciera Martin APRN [facility] POD PAD PAD   8/11/2025  9:00 AM Ellen Soria APRN [facility] ENT PAD PAD   8/29/2025  8:30 AM Eris Dickey MD [facility] CD PAD PAD   11/20/2025  8:00 AM [facility] HEART GROUP PAD DEVICE CHECK [facility] CD PAD PAD   1/6/2026  8:30 AM Leticia Flores APRN [facility] ONC PAD PAD   Follow up with Dr. Bay in 1 month.         Electronically signed by LAQUITA Wills, 03/28/25, 8:59 AM CDT.    Time: 50 minutes

## 2025-03-28 NOTE — NURSING NOTE
"At the start of my shift upon IPASS report patient explained she was  dissatisfied with the services  she has been receiving thus far and stated \"My call light has been going off for the last 30 mins\". Day shift charge nurse tried to explained to her that he had a surgical patient and tried his best to get back to her in a timely matter. He apologized several times but she was still upset. When the oncoming shift asked what we can do to meet her needs she stated \"I would like a blanket, toothpaste and a tooth brush\" On coming shift went to retreive the items she asked for and gave her tissue, mouthwash and hand  along with the items she requested and she stated she didn't need those items and acted upset that they were brought to her room.  Then requested a denture cup as well. On coming shift went to retrieve the denture cup and upon return patient stated \"if my call light goes off again for longer than 30 mins I will throw  myself onto the floor and pretend I Fell, Maybe that will get your attention\". Patient reassured we will do our best to accommodate her requests in a timely matter and will do our best to care for her along with the other patients. 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 Patient then again reassured we will do the best we can to care for her needs.    "

## 2025-03-31 ENCOUNTER — PATIENT MESSAGE (OUTPATIENT)
Dept: CARDIOLOGY | Facility: CLINIC | Age: 60
End: 2025-03-31
Payer: COMMERCIAL

## 2025-03-31 ENCOUNTER — TELEPHONE (OUTPATIENT)
Dept: CARDIOLOGY | Facility: CLINIC | Age: 60
End: 2025-03-31

## 2025-03-31 ENCOUNTER — TRANSITIONAL CARE MANAGEMENT TELEPHONE ENCOUNTER (OUTPATIENT)
Dept: CALL CENTER | Facility: HOSPITAL | Age: 60
End: 2025-03-31
Payer: COMMERCIAL

## 2025-03-31 NOTE — TELEPHONE ENCOUNTER
"    Caller: Kelli Pedro \"Carli\"    Relationship to patient: Self    Best call back number:   Telephone Information:   Mobile [phone]       Chief complaint: FOLLOW UP AFTER HOSPITAL VISIT ON 3-28-25 TO GO BACK TO WORK    Type of visit: FOLLOW UP    Requested date: ASAP  (TODAY OR TOMORROW)    Additional notes:SUPPOSED TO GO BACK TO WORK WEDNESDAY 4-2-25      "

## 2025-03-31 NOTE — OUTREACH NOTE
Call Center TCM Note      Flowsheet Row Responses   [facility] patient discharged from? [facility]   Does the patient have one of the following disease processes/diagnoses(primary or secondary)? Other   TCM attempt successful? Yes   Call start time 1355   Call end time 1359   Discharge diagnosis Syncope   Meds reviewed with patient/caregiver? Yes   Is the patient having any side effects they believe may be caused by any medication additions or changes? No   Does the patient have all medications ordered at discharge? N/A   Is the patient taking all medications as directed (includes completed medication regime)? Yes   Does the patient have an appointment with their PCP within 7-14 days of discharge? No   Nursing Interventions Patient desires to follow up with specialty only   Has home health visited the patient within 72 hours of discharge? N/A   Psychosocial issues? No   Did the patient receive a copy of their discharge instructions? Yes   Nursing interventions Reviewed instructions with patient   What is the patient's perception of their health status since discharge? Improving   Is the patient/caregiver able to teach back signs and symptoms related to disease process for when to call PCP? Yes   Is the patient/caregiver able to teach back signs and symptoms related to disease process for when to call 911? Yes   Is the patient/caregiver able to teach back the hierarchy of who to call/visit for symptoms/problems? PCP, Specialist, Home health nurse, Urgent Care, ED, 911 Yes   If the patient is a current smoker, are they able to teach back resources for cessation? Not a smoker   TCM call completed? Yes   Call end time 1359            Karen RUDD - Registered Nurse    3/31/2025, 14:01 CDT

## 2025-04-01 ENCOUNTER — OFFICE VISIT (OUTPATIENT)
Dept: CARDIOLOGY | Facility: CLINIC | Age: 60
End: 2025-04-01
Payer: COMMERCIAL

## 2025-04-01 ENCOUNTER — HOSPITAL ENCOUNTER (OUTPATIENT)
Dept: ULTRASOUND IMAGING | Facility: HOSPITAL | Age: 60
Discharge: HOME | End: 2025-04-01
Admitting: INTERNAL MEDICINE
Payer: COMMERCIAL

## 2025-04-01 VITALS
HEART RATE: 73 BPM | WEIGHT: 160 LBS | HEIGHT: 66 IN | SYSTOLIC BLOOD PRESSURE: 98 MMHG | BODY MASS INDEX: 25.71 KG/M2 | DIASTOLIC BLOOD PRESSURE: 67 MMHG

## 2025-04-01 DIAGNOSIS — R74.8 ELEVATED ALKALINE PHOSPHATASE LEVEL: ICD-10-CM

## 2025-04-01 DIAGNOSIS — Z95.0 PRESENCE OF CARDIAC PACEMAKER: ICD-10-CM

## 2025-04-01 DIAGNOSIS — I95.1 ORTHOSTASIS: ICD-10-CM

## 2025-04-01 DIAGNOSIS — R06.09 DOE (DYSPNEA ON EXERTION): Primary | ICD-10-CM

## 2025-04-01 DIAGNOSIS — E78.2 MIXED HYPERLIPIDEMIA: Primary | ICD-10-CM

## 2025-04-01 DIAGNOSIS — Z95.2 S/P AVR (AORTIC VALVE REPLACEMENT): ICD-10-CM

## 2025-04-01 DIAGNOSIS — R74.01 TRANSAMINITIS: ICD-10-CM

## 2025-04-01 PROCEDURE — 76705 ECHO EXAM OF ABDOMEN: CPT

## 2025-04-01 PROCEDURE — 99214 OFFICE O/P EST MOD 30 MIN: CPT | Performed by: INTERNAL MEDICINE

## 2025-04-01 RX ORDER — MIDODRINE HYDROCHLORIDE 2.5 MG/1
2.5 TABLET ORAL
Qty: 270 TABLET | Refills: 3 | Status: SHIPPED | OUTPATIENT
Start: 2025-04-01

## 2025-04-01 NOTE — PROGRESS NOTES
Kelli Pedro  [id number]  1965  59 y.o.  female    Referring Provider: ELLEN Tidwell DO    Reason for  Visit: Here for routine follow up   Was admitted and discharged recently   Hospitalization reviewed   Feels back to normal now     Subjective    Overall feeling well   No chest pain or shortness of breath     No palpitations  No significant pedal edema    Compliant with medications and dietary advice  Good effort tolerance    No presyncope or syncope  Compliant with medications    Tolerating current medications well with no untoward side effects   Compliant with prescribed medication regimen. Tries to adhere to cardiac diet.      Feels much better   Chest pain now resolved   Feels can go back to work    History of present illness:  Kelli Pedro is a 59 y.o. 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 She is employed at an assisted living facility.   .    History  Past Medical History:   Diagnosis Date    AAA (abdominal aortic aneurysm)     Abnormal ECG 03/14/2023    Allergic     Anemia     Angina pectoris     Anxiety     Anxiety and depression 06/25/2024    Aortic valve replaced 07/29/2024    Arthritis     Asthma     Bicuspid aortic valve 03/14/2023    Cataract     Surgery 2022    Cholelithiasis     Chronic kidney disease     Clotting disorder     Seeing Leticia Flores at [facility] currently    Colon polyp     Coronary artery disease     Deep vein thrombosis     Dental disease     Diabetes mellitus     Diverticulitis of colon     Diverticulosis     GERD (gastroesophageal reflux disease)     Hashimoto's thyroiditis     In [facility]    Headache     Heart murmur     Hernia     History of medical problems     Aortic aneurysms    HL (hearing loss)     Hypernatremia     In [facility]     Hypertension     Hyperthyroidism     Hypoglycemia     In [facility]    Hypothyroid     Ingrown toenail     Irritable bowel syndrome     Kidney stone     Previous haf lithotripsy    Low back pain     Lupus     Mitral valve prolapse     In the [facility] Dr. Claudio Gibson    Mixed hyperlipidemia 11/18/2024    Neuromuscular disorder     NSTEMI (non-ST elevated myocardial infarction) 12/26/2023    Obesity     Osteopenia     Pancreatitis     Peptic ulcer disease     Peptic ulceration     Plantar fasciitis     Past    Pneumonia     RBBB 03/14/2023    Scoliosis     Sinusitis     Sjogren's disease     Sleep apnea     Years ago, but no longer have since Gastric Sleeve Surgery    Tinnitus     Ulcerative colitis     Urinary tract infection     Visual impairment     Vitamin D deficiency     In [facility]. I take Vit D daily   ,   Past Surgical History:   Procedure Laterality Date    ABDOMINAL AORTIC ANEURYSM REPAIR  06/18/2024    ABDOMINAL SURGERY      ANKLE SURGERY      AORTIC VALVE REPAIR/REPLACEMENT  06/2024    AORTIC VALVE SURGERY      ARTERIAL ANEURYSM REPAIR      ASCENDING ARCH/HEMIARCH REPLACEMENT  06/18/2024    BACK SURGERY      BARIATRIC SURGERY      CARDIAC CATHETERIZATION N/A 12/26/2023    Procedure: Left Heart Cath;  Surgeon: Pablito De Leon DO;  Location:  [facility] CATH [facility];  Service: Cardiovascular;  Laterality: N/A;    CARDIAC CATHETERIZATION  12/26/2023    CARDIAC SURGERY  06/2024    CARDIAC VALVE REPLACEMENT  06/2024    CARPAL TUNNEL RELEASE Bilateral     CHOLECYSTECTOMY      COLONOSCOPY  04/24/2023    normal colon consistent with ibs    ENDOSCOPY  04/24/2023    large hitial hernia,chronic gastritis    EYE SURGERY  2022    Cataracts both eyes    FLEXIBLE SIGMOIDOSCOPY      FOOT SURGERY      GALLBLADDER SURGERY      GASTRIC RESTRICTION SURGERY  2017    HYSTERECTOMY      INSERT / REPLACE / REMOVE PACEMAKER      KIDNEY STONE SURGERY      LITHOTRIPSY      PACEMAKER IMPLANTATION   06/2024    SHOULDER SURGERY      SUBTOTAL HYSTERECTOMY      TOENAIL EXCISION      TRICUSPID VALVE REPLACEMENT      TUBAL ABDOMINAL LIGATION      UPPER GASTROINTESTINAL ENDOSCOPY      VEIN SURGERY      [facility] MyChart   ,   Family History   Problem Relation Age of Onset    Breast cancer Mother 70    Diabetes Mother     Hearing loss Mother     Heart disease Mother     Hyperlipidemia Mother     Vision loss Mother     Clotting disorder Mother     Fainting Mother     Hypertension Mother     Cancer Mother     Migraines Mother     Inflammatory bowel disease Mother     Scleroderma Father     Diabetes Father     Hearing loss Father     Heart disease Father     Hyperlipidemia Father     Vision loss Father     Clotting disorder Father     Fainting Father     Heart attack Father     Hypertension Father     Cancer Father     Stroke Father     Heart valve disorder Father     Colon polyps Father     Inflammatory bowel disease Father     Ulcerative colitis Father     Obesity Father     Diabetes Sister     Hyperlipidemia Sister     Vision loss Sister     Asthma Sister     Hypertension Sister     Thyroid disease Sister     Obesity Sister     Hyperlipidemia Brother     Vision loss Brother     Asthma Brother     Hypertension Brother     Diabetes Brother     Early death Brother     Obesity Brother     Colon cancer Neg Hx    ,   Social History     Tobacco Use    Smoking status: Former     Current packs/day: 0.25     Average packs/day: 0.3 packs/day for 43.8 years (11.0 ttl pk-yrs)     Types: Cigarettes     Start date: 1/1/1991     Quit date: 1/1/1987     Passive exposure: Past    Smokeless tobacco: Never    Tobacco comments:     [id number] for 6 months only   Vaping Use    Vaping status: Never Used   Substance Use Topics    Alcohol use: Not Currently     Comment: Socially very rare    Drug use: Never   ,     Medications  Current Outpatient Medications   Medication Sig Dispense Refill    aspirin 81 MG chewable tablet Chew 1  tablet Daily.      eszopiclone (LUNESTA) 3 MG tablet Take 1 tablet by mouth Every Night. Take immediately before bedtime 90 tablet 1    ferrous sulfate 325 (65 FE) MG tablet Take 1 tablet by mouth 3 (Three) Times a Week. 36 tablet 1    fluticasone (FLONASE) 50 MCG/ACT nasal spray 2 sprays into the nostril(s) as directed by provider Daily. (Patient taking differently: Administer 2 sprays into the nostril(s) as directed by provider Daily As Needed for Allergies.) 16 g 1    Galcanezumab-gnlm (Emgality) 120 MG/ML solution prefilled syringe Inject 1 mL under the skin into the appropriate area as directed Every 30 (Thirty) Days. 1 mL 5    hydrOXYzine (ATARAX) 25 MG tablet Take 1 tablet by mouth Every 6 (Six) Hours As Needed for Anxiety. 120 tablet 1    lansoprazole (PREVACID) 30 MG capsule Take 1 capsule by mouth Daily. 90 capsule 3    levothyroxine (SYNTHROID, LEVOTHROID) 75 MCG tablet Take 1 tablet by mouth 5 (Five) Times a Week. Monday, Tuesday, Thursday, Friday and Saturday      levothyroxine (SYNTHROID, LEVOTHROID) 75 MCG tablet Take 0.5 tablets by mouth 1 (One) Time Per Week. Wednesday      linaclotide (Linzess) 145 MCG capsule capsule Take 1 capsule by mouth Every Morning Before Breakfast. 90 capsule 1    magnesium oxide (MAG-OX) 400 MG tablet Take 1 tablet by mouth Daily. 90 tablet 1    midodrine (PROAMATINE) 2.5 MG tablet Take 1 tablet by mouth 3 (Three) Times a Day Before Meals. Take 2.5 mg in addition to 5 mg tid for total 7.5 mg tid 270 tablet 3    midodrine (PROAMATINE) 5 MG tablet Take 1 tablet by mouth 3 (Three) Times a Day Before Meals. 90 tablet 5    mycophenolate (CELLCEPT) 500 MG tablet Take 1 tablet by mouth Daily. 90 tablet 1    Narcan 4 MG/0.1ML nasal spray Administer 1 spray into the nostril(s) as directed by provider As Needed for Opioid Reversal.      nitroglycerin (NITROSTAT) 0.4 MG SL tablet Place 1 tablet under the tongue Every 5 (Five) Minutes As Needed for Chest Pain. Take no more than 3  doses in 15 minutes. 100 tablet 2    ondansetron ODT (ZOFRAN-ODT) 4 MG disintegrating tablet Place 1 tablet on the tongue Every 8 (Eight) Hours As Needed for Nausea or Vomiting.      PARoxetine (Paxil) 40 MG tablet Take 1 tablet by mouth Every Morning. 90 tablet 1    pilocarpine (SALAGEN) 5 MG tablet Take 1 tablet by mouth 3 (Three) Times a Day.      potassium chloride (KLOR-CON M10) 10 MEQ CR tablet Take 1 tablet by mouth 2 (Two) Times a Day. 180 tablet 0    promethazine (PHENERGAN) 25 MG tablet Take 1 tablet by mouth Every 6 (Six) Hours As Needed for Nausea or Vomiting. (Patient taking differently: Take 1 tablet by mouth 3 (Three) Times a Day Before Meals.)      ranolazine (Ranexa) 1000 MG 12 hr tablet Take 1 tablet by mouth 2 (Two) Times a Day. 60 tablet 11    Semaglutide, 1 MG/DOSE, (OZEMPIC) 4 MG/3ML solution pen-injector Inject 1 mg under the skin into the appropriate area as directed 1 (One) Time Per Week. 3 mL 0    ubrogepant (UBRELVY) 100 MG tablet Take 1 tablet at the onset of headache. May repeat dose x1 in 2 hours if headache is not resolved. Max dose 200 mg/24 hours. 30 tablet 2     No current facility-administered medications for this visit.       Allergies:  Clavulanic acid, Gadolinium, Verapamil hcl er, Zoster vac recomb adjuvanted, Flagyl [metronidazole], Arava [leflunomide], Estradiol, Hydroxychloroquine, Metformin, Prednisone, Protonix [pantoprazole], Tetracyclines & related, and Trazodone    Review of Systems  Review of Systems   Constitutional: Negative.   HENT: Negative.     Eyes: Negative.    Cardiovascular:  Positive for dyspnea on exertion and palpitations. Negative for chest pain, claudication, cyanosis, irregular heartbeat, leg swelling, near-syncope, orthopnea, paroxysmal nocturnal dyspnea and syncope.   Respiratory: Negative.     Endocrine: Negative.    Hematologic/Lymphatic: Negative.    Skin: Negative.    Gastrointestinal:  Negative for anorexia.   Genitourinary: Negative.   "  Neurological: Negative.    Psychiatric/Behavioral: Negative.         Objective     Physical Exam:  BP 98/67   Pulse 73   Ht 167.6 cm (66\")   Wt 72.6 kg (160 lb)   BMI 25.82 kg/m²     Physical Exam  Constitutional:       Appearance: Normal appearance.   HENT:      Head: Normocephalic.   Eyes:      General: Lids are normal.   Neck:      Vascular: No carotid bruit.   Cardiovascular:      Rate and Rhythm: Regular rhythm.      Heart sounds: S1 normal and S2 normal. Murmur heard.      Systolic murmur is present with a grade of 2/6.   Pulmonary:      Effort: Pulmonary effort is normal.   Abdominal:      Palpations: Abdomen is soft.   Neurological:      Mental Status: She is alert.   Psychiatric:         Speech: Speech normal.         Behavior: Behavior normal.         Thought Content: Thought content normal.         Results Review:    Stress Test with PET Myocardial Perfusion with CT (Single Study)    Accession Number: [id number]   Date of Study: 3/21/25   Ordering Provider: Gavino Bay MD   Clinical Indications: Chest Pain        [facility]  Performing Staff   ECG Dutton, SPECT Dutton: Gavino Bay MD    Tech: Yasmine Layton    Support Staff: Helene Lopez RN             Interpretation Summary         Moderate area of ischemia noted in the left ventricular apical region    Total perfusion defect with stress was 9% and rest was 0.33%    Left ventricular ejection fraction is normal (Calculated EF = 69%).    LAD coronary flow reserve of 4.23, LCx 3.32, RCA 0.60 and total 3.88    Aortic valve replacement noted and pacemaker leads noted       Narrative & Impression   EXAMINATION: CT ANGIOGRAM CHEST-      10/7/2024 10:36 AM     HISTORY: Right-sided chest pain radiating to back.  Please comment on  aorta as well.  History of DVT     In order to have a CT radiation dose as low as reasonably achievable  Automated Exposure Control was utilized for adjustment of the mA and/or  KV according to patient size.     Total " DLP = 391.33 mGy.cm     CT angiography of the chest tube performed after intravenous contrast  enhancement.     Images are acquired in axial plane is subsequent reconstruction in  coronal and sagittal planes.     The comparison is made with the previous study dated 4/1/2024.     An aortic valve prosthesis is in place. This was not noted in the  previous study.     Atheromatous change of the thoracic aorta are seen. No aneurysmal  dilatation or dissection. The maximum lumen of the ascending thoracic  aorta measures 3.1 cm. It measured 4.4 cm at the same level in the  previous study. Sinus of Valsalva measures 3.7 cm in the coronal plane  images.     Normal opacification of the pulmonary arteries and branches bilaterally.  No filling defects in the visualized/opacified pulmonary arterial bed.     Atheromatous changes of the coronary arteries are seen. There is  evidence of prior CABG.     There is no evidence of mediastinal or hilar mass or lymphadenopathy.     There is normal origin course and caliber of the brachiocephalic, left  common carotid and left subclavian arteries.     Limited visualized soft tissue of the neck are unremarkable.  Incompletely visualized thyroid gland appears unremarkable. No nodules.     The lungs are moderately well-expanded.     No areas of focal consolidation or acute infiltrate.     No lung nodules.     Central airway is patent and clear. No endobronchial lesions.     There is a small sliding-type hiatal hernia. There are postsurgical  changes of the stomach.     Limited visualized abdomen appears unremarkable. Images reviewed in bone  window show no acute bony abnormality. There is mild dextroscoliosis of  thoracic spine. A cardiac pacer is seen in place.     IMPRESSION:  1. No acute abnormality of the chest to account for patient's symptoms.  2. Atheromatous disease of the thoracic aorta. No evidence of aneurysmal  dilatation. The aortic lumen is normal and the previously  seen  dilatation of the ascending aorta is not visualized in this study. There  is interval placement of aortic valvular prosthesis.  3. The lungs are unremarkable.          Results for orders placed during the hospital encounter of 03/17/25    Adult Transthoracic Echo Complete W/ Cont if Necessary Per Protocol    Interpretation Summary    Left ventricular systolic function is normal. Calculated left ventricular EF = 68.6% Left ventricular ejection fraction appears to be 66 - 70%.    Left ventricular diastolic function was normal.    Estimated right ventricular systolic pressure from tricuspid regurgitation is normal (<35 mmHg).        ____________________________________________________________________________________________________________________________________________  Health maintenance and recommendations    Low salt/ HTN/ Heart healthy carbohydrate restricted cardiac diet   The patient is advised to reduce or avoid caffeine or other cardiac stimulants.   Minimize or avoid  NSAID-type medications      Monitor for any signs of bleeding including red or dark stools. Fall precautions.   Advised staying uptodate with immunizations per established standard guidelines.    Offered to give patient  a copy of my notes     Questions were encouraged, asked and answered to the patient's  understanding and satisfaction. Questions if any regarding current medications and side effects, need for refills and importance of compliance to medications stressed.    Reviewed available prior notes, consults, prior visits, laboratory findings, radiology and cardiology relevant reports. Updated chart as applicable. I have reviewed the patient's medical history in detail and updated the computerized patient record as relevant.      Updated patient regarding any new or relevant abnormalities on review of records or any new findings on physical exam. Mentioned to patient about purpose of visit and desirable health short and long term  goals and objectives.    Primary to monitor CBC CMP Lipid panel and TSH as applicable    ___________________________________________________________________________________________________________________________________________   Procedures    Assessment & Plan   Diagnoses and all orders for this visit:    1. Mixed hyperlipidemia (Primary)    2. Presence of cardiac pacemaker    3. S/P AVR (aortic valve replacement)    4. Orthostasis    Other orders  -     midodrine (PROAMATINE) 2.5 MG tablet; Take 1 tablet by mouth 3 (Three) Times a Day Before Meals. Take 2.5 mg in addition to 5 mg tid for total 7.5 mg tid  Dispense: 270 tablet; Refill: 3              Plan    Overall much better   Can return to work with no restrictions   She wants to go back to work     Check BP and heart rates twice daily initially till blood pressures and heart rates under good control and then at least 3x / week,   If blood pressures continue to be well-controlled then can check week a month  at home and bring a recording for review next visit  If BP >130/85 or < 100/60 persistently over 3 reading 30 mins apart or if heart rates persistently above 100 bpm or less than 55 bpm call sooner for evaluation and advise   Chest pains completely resolved     Increase Midodrine to 7.5 mg TID and then 10 mg TID   Stopped HCTZ   Monitor for any signs of bleeding including red or dark stools as well as easy bruisabilty. Fall precautions.   Discussed cardiac PET and echo results     See orders for workup for cardiac amyloidosis   Blood, urine and PYP/HDP scan ordered      Keep follow up as scheduled with Sharon Maddox APRN or PRN sooner if any new or worsening problem.

## 2025-04-04 DIAGNOSIS — F06.4 ANXIETY DISORDER DUE TO MEDICAL CONDITION: ICD-10-CM

## 2025-04-04 RX ORDER — PAROXETINE 20 MG/1
20 TABLET, FILM COATED ORAL EVERY MORNING
Qty: 90 TABLET | Refills: 1 | OUTPATIENT
Start: 2025-04-04

## 2025-04-07 ENCOUNTER — TELEPHONE (OUTPATIENT)
Dept: UROLOGY | Facility: CLINIC | Age: 60
End: 2025-04-07
Payer: COMMERCIAL

## 2025-04-07 NOTE — TELEPHONE ENCOUNTER
Called patient, no answer, left VM    I let the patient know we had to move her appt with Dorothy to 4/21 @ 10:30 AM. She will still need to have her KUB done 1 hour beforehand.    IF THE PATIENT CALLS BACK, IT IS OKAY FOR THE HUB TO RESCHEDULE IF NECESSARY.

## 2025-04-10 ENCOUNTER — TELEPHONE (OUTPATIENT)
Dept: CARDIOLOGY | Facility: CLINIC | Age: 60
End: 2025-04-10
Payer: COMMERCIAL

## 2025-04-10 NOTE — TELEPHONE ENCOUNTER
PT CALLED TO LET US KNOW THAT TODAY IS DAY 3 OF TAKING MIDODRINE 7.5 MG TID.  SHE STATES THAT THE FIRST DAY SHE FELT GREAT & BETTER THAN SHE HAD IN THE LAST 3 MONTHS, SHE IS NOW ON DAY 3 & IS FEELING FOGGY HEADED WHILE AT WORK. SHE IS AFRAID SHE MAY PASS OUT.    SHE STATES THAT HER BP /78 & WOULD LIKE FOR IT TO BE HIGHER.  SHE ALSO STATES THAT WHILE SHE WAS LAST ADMITTED SHE WAS TOLD THAT SHE COULD INCREASE THE MIDODRINE UP TO 10 MG TID & WONDERS IF THAT'S WHAT SHE SHOULD DO    PLEASE ADVISE

## 2025-04-11 ENCOUNTER — TELEPHONE (OUTPATIENT)
Dept: INTERNAL MEDICINE | Facility: CLINIC | Age: 60
End: 2025-04-11
Payer: COMMERCIAL

## 2025-04-11 NOTE — TELEPHONE ENCOUNTER
Patient called the office in regards to her medication Emgality -   States she had communicated with someone from the office yesterday and within the last week needing a prior authorization for this medication to be filled.   Notified patient I will discuss w/ Dr. Tidwell's MA to see if this has been started and she will be contacted Monday with updated information.   Patient voiced understanding.

## 2025-04-11 NOTE — PROGRESS NOTES
Subjective    Ms. Pedro is 59 y.o. female    Chief Complaint: Nephrolithiasis follow-up    History of Present Illness    59-year-old female established patient in for annual follow-up regarding nephrolithiasis.  Last stone episode when patient developed left-sided flank pain spontaneous passage punctate fragment 4/2024.  Known history of stones last requiring surgical intervention approximately 5 years ago in [facility].  Unknown tissue pathology.  None metabolic evaluation.  Currently asymptomatic.  Denies further flank pain or hematuria.     Only current complaint being syncopal episodes due to low blood pressure for which she is taking midodrine and is followed closely by cardiology patient underwent AVR and myocardial bridging at [facility] in [place].    The following portions of the patient's history were reviewed and updated as appropriate: allergies, current medications, past family history, past medical history, past social history, past surgical history and problem list.    Review of Systems   Constitutional:  Negative for chills and fever.   Gastrointestinal:  Negative for nausea and vomiting.   Genitourinary:  Negative for flank pain, frequency and urgency.         Current Outpatient Medications:     aspirin 81 MG chewable tablet, Chew 1 tablet Daily., Disp: , Rfl:     Continuous Glucose Sensor (FreeStyle Aydin 2 Sensor) misc, , Disp: , Rfl:     eszopiclone (LUNESTA) 3 MG tablet, Take 1 tablet by mouth Every Night. Take immediately before bedtime, Disp: 90 tablet, Rfl: 1    ferrous sulfate 325 (65 FE) MG tablet, Take 1 tablet by mouth 3 (Three) Times a Week., Disp: 36 tablet, Rfl: 1    fluticasone (FLONASE) 50 MCG/ACT nasal spray, 2 sprays into the nostril(s) as directed by provider Daily. (Patient taking differently: Administer 2 sprays into the nostril(s) as directed by provider Daily As Needed for Allergies.), Disp: 16 g, Rfl: 1    gabapentin (NEURONTIN) 300 MG capsule, 1 capsule  Every 8 (Eight) Hours., Disp: , Rfl:     Galcanezumab-gnlm (Emgality) 120 MG/ML solution prefilled syringe, Inject 1 mL under the skin into the appropriate area as directed Every 30 (Thirty) Days., Disp: 1 mL, Rfl: 5    hydrOXYzine (ATARAX) 25 MG tablet, Take 1 tablet by mouth Every 6 (Six) Hours As Needed for Anxiety., Disp: 120 tablet, Rfl: 1    lansoprazole (PREVACID) 30 MG capsule, Take 1 capsule by mouth Daily., Disp: 90 capsule, Rfl: 3    levothyroxine (SYNTHROID, LEVOTHROID) 75 MCG tablet, Take 1 tablet by mouth 5 (Five) Times a Week. Monday, Tuesday, Thursday, Friday and Saturday, Disp: , Rfl:     linaclotide (Linzess) 145 MCG capsule capsule, Take 1 capsule by mouth Every Morning Before Breakfast., Disp: 90 capsule, Rfl: 1    losartan (COZAAR) 25 MG tablet, Daily., Disp: , Rfl:     magnesium oxide (MAG-OX) 400 MG tablet, Take 1 tablet by mouth Daily., Disp: 90 tablet, Rfl: 1    midodrine (PROAMATINE) 2.5 MG tablet, Take 1 tablet by mouth 3 (Three) Times a Day Before Meals. Take 2.5 mg in addition to 5 mg tid for total 7.5 mg tid, Disp: 270 tablet, Rfl: 3    midodrine (PROAMATINE) 5 MG tablet, Take 1 tablet by mouth 3 (Three) Times a Day Before Meals., Disp: 90 tablet, Rfl: 5    mycophenolate (CELLCEPT) 500 MG tablet, Take 1 tablet by mouth Daily., Disp: 90 tablet, Rfl: 1    Narcan 4 MG/0.1ML nasal spray, Administer 1 spray into the nostril(s) as directed by provider As Needed for Opioid Reversal., Disp: , Rfl:     nitroglycerin (NITROSTAT) 0.4 MG SL tablet, Place 1 tablet under the tongue Every 5 (Five) Minutes As Needed for Chest Pain. Take no more than 3 doses in 15 minutes., Disp: 100 tablet, Rfl: 2    ondansetron ODT (ZOFRAN-ODT) 4 MG disintegrating tablet, Place 1 tablet on the tongue Every 8 (Eight) Hours As Needed for Nausea or Vomiting., Disp: , Rfl:     PARoxetine (Paxil) 40 MG tablet, Take 1 tablet by mouth Every Morning., Disp: 90 tablet, Rfl: 1    pilocarpine (SALAGEN) 5 MG tablet, Take 1 tablet  by mouth 3 (Three) Times a Day., Disp: , Rfl:     potassium chloride (KLOR-CON M10) 10 MEQ CR tablet, Take 1 tablet by mouth 2 (Two) Times a Day., Disp: 180 tablet, Rfl: 0    promethazine (PHENERGAN) 25 MG tablet, Take 1 tablet by mouth Every 6 (Six) Hours As Needed for Nausea or Vomiting. (Patient taking differently: Take 1 tablet by mouth 3 (Three) Times a Day Before Meals.), Disp: , Rfl:     propranolol (INDERAL) 10 MG tablet, Every 12 (Twelve) Hours., Disp: , Rfl:     ranolazine (Ranexa) 1000 MG 12 hr tablet, Take 1 tablet by mouth 2 (Two) Times a Day., Disp: 60 tablet, Rfl: 11    Semaglutide, 1 MG/DOSE, (OZEMPIC) 4 MG/3ML solution pen-injector, Inject 1 mg under the skin into the appropriate area as directed 1 (One) Time Per Week., Disp: 3 mL, Rfl: 0    ubrogepant (UBRELVY) 100 MG tablet, Take 1 tablet at the onset of headache. May repeat dose x1 in 2 hours if headache is not resolved. Max dose 200 mg/24 hours., Disp: 30 tablet, Rfl: 2    Past Medical History:   Diagnosis Date    AAA (abdominal aortic aneurysm)     Abnormal ECG 03/14/2023    Allergic     Anemia     Angina pectoris     Anxiety     Anxiety and depression 06/25/2024    Aortic valve replaced 07/29/2024    Arthritis     Asthma     Bicuspid aortic valve 03/14/2023    Cataract     Surgery 2022    Cholelithiasis     Chronic kidney disease     Clotting disorder     Seeing Leticia Flores at [facility] currently    Colon polyp     Coronary artery disease     Deep vein thrombosis     Dental disease     Diabetes mellitus     Diverticulitis of colon     Diverticulosis     GERD (gastroesophageal reflux disease)     Hashimoto's thyroiditis     In MyChart    Headache     Heart murmur     Hernia     History of medical problems     Aortic aneurysms    HL (hearing loss)     Hypernatremia     In MyChart    Hypertension     Hyperthyroidism     Hypoglycemia     In MyChart    Hypothyroid     Ingrown toenail     Irritable bowel syndrome     Kidney stone     Previous haf  lithotripsy    Low back pain     Lupus     Mitral valve prolapse     [facility]    Mixed hyperlipidemia 11/18/2024    Neuromuscular disorder     NSTEMI (non-ST elevated myocardial infarction) 12/26/2023    Obesity     Osteopenia     Pancreatitis     Peptic ulcer disease     Peptic ulceration     Plantar fasciitis     Past    Pneumonia     RBBB 03/14/2023    Scoliosis     Sinusitis     Sjogren's disease     Sleep apnea     Years ago, but no longer have since Gastric Sleeve Surgery    Tinnitus     Ulcerative colitis     Urinary tract infection     Visual impairment     Vitamin D deficiency     [facility] I take Vit D daily       Past Surgical History:   Procedure Laterality Date    ABDOMINAL AORTIC ANEURYSM REPAIR  06/18/2024    ABDOMINAL SURGERY      ANKLE SURGERY      AORTIC VALVE REPAIR/REPLACEMENT  06/2024    AORTIC VALVE SURGERY      ARTERIAL ANEURYSM REPAIR      ASCENDING ARCH/HEMIARCH REPLACEMENT  06/18/2024    BACK SURGERY      BARIATRIC SURGERY      CARDIAC CATHETERIZATION N/A 12/26/2023    Procedure: Left Heart Cath;  Surgeon: Pablito De Leon DO;  Location:  [facility] CATH [facility];  Service: Cardiovascular;  Laterality: N/A;    CARDIAC CATHETERIZATION  12/26/2023    CARDIAC SURGERY  06/2024    CARDIAC VALVE REPLACEMENT  06/2024    CARPAL TUNNEL RELEASE Bilateral     CHOLECYSTECTOMY      COLONOSCOPY  04/24/2023    normal colon consistent with ibs    ENDOSCOPY  04/24/2023    large hitial hernia,chronic gastritis    EYE SURGERY  2022    Cataracts both eyes    FLEXIBLE SIGMOIDOSCOPY      FOOT SURGERY      GALLBLADDER SURGERY      GASTRIC RESTRICTION SURGERY  2017    HYSTERECTOMY      INSERT / REPLACE / REMOVE PACEMAKER      KIDNEY STONE SURGERY      LITHOTRIPSY      PACEMAKER IMPLANTATION  06/2024    SHOULDER SURGERY      SUBTOTAL HYSTERECTOMY      TOENAIL EXCISION      TRICUSPID VALVE REPLACEMENT      TUBAL ABDOMINAL LIGATION      UPPER GASTROINTESTINAL  ENDOSCOPY      VEIN SURGERY      [facility]       Social History     Socioeconomic History    Marital status:    Tobacco Use    Smoking status: Former     Current packs/day: 0.25     Average packs/day: 0.2 packs/day for 43.9 years (11.0 ttl pk-yrs)     Types: Cigarettes     Start date: 1/1/1991     Quit date: 1/1/1987     Passive exposure: Past    Smokeless tobacco: Never    Tobacco comments:     1613-2818 for 6 months only   Vaping Use    Vaping status: Never Used   Substance and Sexual Activity    Alcohol use: Not Currently     Comment: Socially very rare    Drug use: Never    Sexual activity: Yes     Partners: Male     Birth control/protection: None, Partner of same sex     Comment: Thats one male patner i live with       Family History   Problem Relation Age of Onset    Breast cancer Mother 70    Diabetes Mother     Hearing loss Mother     Heart disease Mother     Hyperlipidemia Mother     Vision loss Mother     Clotting disorder Mother     Fainting Mother     Hypertension Mother     Cancer Mother     Migraines Mother     Inflammatory bowel disease Mother     Scleroderma Father     Diabetes Father     Hearing loss Father     Heart disease Father     Hyperlipidemia Father     Vision loss Father     Clotting disorder Father     Fainting Father     Heart attack Father     Hypertension Father     Cancer Father     Stroke Father     Heart valve disorder Father     Colon polyps Father     Inflammatory bowel disease Father     Ulcerative colitis Father     Obesity Father     Diabetes Sister     Hyperlipidemia Sister     Vision loss Sister     Asthma Sister     Hypertension Sister     Thyroid disease Sister     Obesity Sister     Hyperlipidemia Brother     Vision loss Brother     Asthma Brother     Hypertension Brother     Diabetes Brother     Early death Brother     Obesity Brother     Colon cancer Neg Hx        Objective    There were no vitals taken for this visit.    Physical Exam  Nursing  note reviewed.   Constitutional:       General: She is not in acute distress.     Appearance: Normal appearance. She is not ill-appearing.   HENT:      Nose: No congestion.   Abdominal:      Tenderness: There is no right CVA tenderness or left CVA tenderness.      Hernia: No hernia is present.   Skin:     General: Skin is warm and dry.   Neurological:      Mental Status: She is alert and oriented to person, place, and time.   Psychiatric:         Mood and Affect: Mood normal.         Behavior: Behavior normal.             Results for orders placed or performed in visit on 04/25/25   High Sensitivity Troponin T    Collection Time: 04/25/25  6:49 AM    Specimen: Blood   Result Value Ref Range    HS Troponin T 8 <14 ng/L   BNP    Collection Time: 04/25/25  6:49 AM    Specimen: Blood   Result Value Ref Range    proBNP 332.3 0.0 - 900.0 pg/mL   XR Abdomen KUB (04/25/2025 07:10)   Assessment and Plan    Diagnoses and all orders for this visit:    1. Renal cyst (Primary)  -     POC Urinalysis Dipstick, Multipro    2. Kidney stones  -     XR Abdomen KUB; Future      KUB today no definitive stones visualized.  Radiology has noted possible calcification within the right lower pole however I personally do not feel this is a stone.  Based on patient's CT angiogram of the abdomen and pelvis from April 2024 patient with 1 small upper pole renal stone that was previously seen on last KUB last year measuring 3 mm that is not visualized on today's imaging.  Patient unsure if has passed the stone or not.    Recommend continuing to monitor will follow-up in 1 year with repeat KUB prior

## 2025-04-14 ENCOUNTER — TELEPHONE (OUTPATIENT)
Dept: INTERNAL MEDICINE | Facility: CLINIC | Age: 60
End: 2025-04-14
Payer: COMMERCIAL

## 2025-04-14 DIAGNOSIS — G43.701 CHRONIC MIGRAINE WITHOUT AURA WITH STATUS MIGRAINOSUS, NOT INTRACTABLE: ICD-10-CM

## 2025-04-14 RX ORDER — GALCANEZUMAB 120 MG/ML
1 INJECTION, SOLUTION SUBCUTANEOUS
Qty: 1 ML | Refills: 5 | Status: SHIPPED | OUTPATIENT
Start: 2025-04-14

## 2025-04-14 NOTE — TELEPHONE ENCOUNTER
[email or web address] message sent to patient informing her that our office has not received notice from pharmacy that [facility] is needing a PA.  Once received, I will submit for authorization.

## 2025-04-14 NOTE — TELEPHONE ENCOUNTER
"Hub staff attempted to follow warm transfer process and was unsuccessful     Caller: Kelli Pedro \"Carli\"    Relationship to patient: Self    Best call back number:     [phone]        Patient is needing: PT IS CALLING TO DISCUSS PA THAT WAS SENT OVER LAST WEEK FOR [facility]. SHE IS REQUESTING A CALL BACK FROM KATTY. SHE SAYS THAT THIS HAS LAPSED AND SHE IS AFRAID SE WILL GET MIGRAINES AGAIN SOON IF SHE DOESN'T GET THIS MEDICATION. LEAVE VM IF NO ANSWER OR MESSAGE VIA [employer].         "

## 2025-04-15 ENCOUNTER — PATIENT MESSAGE (OUTPATIENT)
Dept: CARDIOLOGY | Facility: CLINIC | Age: 60
End: 2025-04-15
Payer: COMMERCIAL

## 2025-04-17 ENCOUNTER — TELEPHONE (OUTPATIENT)
Dept: INTERNAL MEDICINE | Facility: CLINIC | Age: 60
End: 2025-04-17

## 2025-04-17 NOTE — TELEPHONE ENCOUNTER
"  Caller: Kelli Pedro \"Carli\"    Relationship: Self    Best call back number: [phone]     What is the best time to reach you: ANYTIME    Who are you requesting to speak with (clinical staff, provider,  specific staff member): CLINICAL    What was the call regarding: [employer] CAP    WAS SUPPOSED TO FILL OUT A FORM?    PLEASE CALL PATIENT BACK WITH ADVISEMENT  "

## 2025-04-24 ENCOUNTER — TELEPHONE (OUTPATIENT)
Dept: UROLOGY | Facility: CLINIC | Age: 60
End: 2025-04-24
Payer: COMMERCIAL

## 2025-04-24 NOTE — TELEPHONE ENCOUNTER
Called Patient to remind them to get KUB prior to appointment.  NO ANSWER with Patient.  If patient calls back it is ok for the HUB to tell the pt the message.

## 2025-04-25 ENCOUNTER — HOSPITAL ENCOUNTER (OUTPATIENT)
Dept: GENERAL RADIOLOGY | Facility: HOSPITAL | Age: 60
Discharge: HOME | End: 2025-04-25
Payer: COMMERCIAL

## 2025-04-25 ENCOUNTER — OFFICE VISIT (OUTPATIENT)
Dept: UROLOGY | Facility: CLINIC | Age: 60
End: 2025-04-25
Payer: COMMERCIAL

## 2025-04-25 ENCOUNTER — LAB (OUTPATIENT)
Dept: LAB | Facility: HOSPITAL | Age: 60
End: 2025-04-25
Payer: COMMERCIAL

## 2025-04-25 DIAGNOSIS — R06.09 DOE (DYSPNEA ON EXERTION): ICD-10-CM

## 2025-04-25 DIAGNOSIS — N20.0 KIDNEY STONES: ICD-10-CM

## 2025-04-25 DIAGNOSIS — N20.0 RIGHT RENAL STONE: ICD-10-CM

## 2025-04-25 DIAGNOSIS — N28.1 RENAL CYST: Primary | ICD-10-CM

## 2025-04-25 LAB
NT-PROBNP SERPL-MCNC: 332.3 PG/ML (ref 0–900)
TROPONIN T SERPL HS-MCNC: 8 NG/L

## 2025-04-25 PROCEDURE — 86334 IMMUNOFIX E-PHORESIS SERUM: CPT

## 2025-04-25 PROCEDURE — 83880 ASSAY OF NATRIURETIC PEPTIDE: CPT

## 2025-04-25 PROCEDURE — 36415 COLL VENOUS BLD VENIPUNCTURE: CPT

## 2025-04-25 PROCEDURE — 84484 ASSAY OF TROPONIN QUANT: CPT

## 2025-04-25 PROCEDURE — 74018 RADEX ABDOMEN 1 VIEW: CPT

## 2025-04-25 PROCEDURE — 84155 ASSAY OF PROTEIN SERUM: CPT

## 2025-04-25 PROCEDURE — 84165 PROTEIN E-PHORESIS SERUM: CPT

## 2025-04-25 PROCEDURE — 82784 ASSAY IGA/IGD/IGG/IGM EACH: CPT

## 2025-04-25 PROCEDURE — 83521 IG LIGHT CHAINS FREE EACH: CPT

## 2025-04-25 RX ORDER — PROPRANOLOL HYDROCHLORIDE 10 MG/1
TABLET ORAL EVERY 12 HOURS SCHEDULED
COMMUNITY

## 2025-04-25 RX ORDER — GABAPENTIN 300 MG/1
1 CAPSULE ORAL EVERY 8 HOURS SCHEDULED
COMMUNITY

## 2025-04-25 RX ORDER — LOSARTAN POTASSIUM 25 MG/1
TABLET ORAL EVERY 24 HOURS
COMMUNITY

## 2025-04-27 ENCOUNTER — LAB (OUTPATIENT)
Dept: LAB | Facility: HOSPITAL | Age: 60
End: 2025-04-27
Payer: COMMERCIAL

## 2025-04-27 DIAGNOSIS — R06.09 DOE (DYSPNEA ON EXERTION): ICD-10-CM

## 2025-04-27 PROCEDURE — 84166 PROTEIN E-PHORESIS/URINE/CSF: CPT

## 2025-04-27 PROCEDURE — 84156 ASSAY OF PROTEIN URINE: CPT

## 2025-04-28 RX ORDER — LANSOPRAZOLE 30 MG/1
30 CAPSULE, DELAYED RELEASE ORAL DAILY
Qty: 90 CAPSULE | Refills: 3 | Status: SHIPPED | OUTPATIENT
Start: 2025-04-28

## 2025-04-29 LAB
ALBUMIN SERPL ELPH-MCNC: 3.8 G/DL (ref 2.9–4.4)
ALBUMIN/GLOB SERPL: 1.5 {RATIO} (ref 0.7–1.7)
ALPHA1 GLOB SERPL ELPH-MCNC: 0.2 G/DL (ref 0–0.4)
ALPHA2 GLOB SERPL ELPH-MCNC: 0.8 G/DL (ref 0.4–1)
B-GLOBULIN SERPL ELPH-MCNC: 1 G/DL (ref 0.7–1.3)
GAMMA GLOB SERPL ELPH-MCNC: 0.7 G/DL (ref 0.4–1.8)
GLOBULIN SER-MCNC: 2.6 G/DL (ref 2.2–3.9)
IGA SERPL-MCNC: 128 MG/DL (ref 87–352)
IGG SERPL-MCNC: 777 MG/DL (ref 586–1602)
IGM SERPL-MCNC: 56 MG/DL (ref 26–217)
INTERPRETATION SERPL IEP-IMP: ABNORMAL
KAPPA LC FREE SER-MCNC: 15 MG/L (ref 3.3–19.4)
KAPPA LC FREE/LAMBDA FREE SER: 1.9 {RATIO} (ref 0.26–1.65)
LABORATORY COMMENT REPORT: ABNORMAL
LAMBDA LC FREE SERPL-MCNC: 7.9 MG/L (ref 5.7–26.3)
M PROTEIN SERPL ELPH-MCNC: ABNORMAL G/DL
PROT SERPL-MCNC: 6.4 G/DL (ref 6–8.5)

## 2025-04-30 ENCOUNTER — OFFICE VISIT (OUTPATIENT)
Dept: CARDIOLOGY | Facility: CLINIC | Age: 60
End: 2025-04-30
Payer: COMMERCIAL

## 2025-04-30 VITALS
HEIGHT: 66 IN | BODY MASS INDEX: 25.3 KG/M2 | HEART RATE: 80 BPM | OXYGEN SATURATION: 99 % | SYSTOLIC BLOOD PRESSURE: 106 MMHG | WEIGHT: 157.4 LBS | DIASTOLIC BLOOD PRESSURE: 62 MMHG

## 2025-04-30 DIAGNOSIS — I95.1 ORTHOSTASIS: Primary | ICD-10-CM

## 2025-04-30 DIAGNOSIS — Z95.2 S/P AVR (AORTIC VALVE REPLACEMENT): ICD-10-CM

## 2025-04-30 DIAGNOSIS — I71.21 ANEURYSM OF ASCENDING AORTA WITHOUT RUPTURE: ICD-10-CM

## 2025-04-30 DIAGNOSIS — Q24.5 MYOCARDIAL BRIDGE: ICD-10-CM

## 2025-04-30 DIAGNOSIS — Z95.0 PRESENCE OF CARDIAC PACEMAKER: ICD-10-CM

## 2025-04-30 LAB
ALBUMIN 24H MFR UR ELPH: 27.8 %
ALPHA1 GLOB 24H MFR UR ELPH: 6.5 %
ALPHA2 GLOB 24H MFR UR ELPH: 18.9 %
B-GLOBULIN 24H MFR UR ELPH: 30.8 %
GAMMA GLOB 24H MFR UR ELPH: 16 %
LABORATORY COMMENT REPORT: NORMAL
M PROTEIN 24H MFR UR ELPH: NORMAL %
PROT 24H UR-MRATE: 146 MG/24 HR (ref 30–150)
PROT UR-MCNC: 11.2 MG/DL

## 2025-04-30 PROCEDURE — 99214 OFFICE O/P EST MOD 30 MIN: CPT | Performed by: NURSE PRACTITIONER

## 2025-04-30 RX ORDER — MIDODRINE HYDROCHLORIDE 10 MG/1
10 TABLET ORAL
Qty: 60 TABLET | Refills: 11 | Status: SHIPPED | OUTPATIENT
Start: 2025-04-30

## 2025-04-30 RX ORDER — ALBUTEROL SULFATE 90 UG/1
INHALANT RESPIRATORY (INHALATION) EVERY 4 HOURS PRN
COMMUNITY
Start: 2025-01-24

## 2025-04-30 RX ORDER — MIDODRINE HYDROCHLORIDE 2.5 MG/1
2.5 TABLET ORAL NIGHTLY PRN
Qty: 90 TABLET | Refills: 3 | Status: SHIPPED | OUTPATIENT
Start: 2025-04-30

## 2025-04-30 NOTE — PROGRESS NOTES
"  Subjective:     Encounter Date:04/30/2025      Patient ID: Kelli Pedro is a 59 y.o. female.    Chief Complaint:\"BP is all over the place\"  Heart Murmur  Symptoms are chronic.   Onset was more than 1 month ago.   Symptoms occur daily.   Symptoms have been waxing and waning since onset.   Pertinent negative symptoms include no chest pain, no cough, no rash and no weakness.   Leg Swelling  Symptoms are chronic.   Onset was more than 1 year ago.   Symptoms occur daily.   Symptoms have been waxing and waning since onset.   Pertinent negative symptoms include no chest pain, no cough, no rash and no weakness.   Heart Problem  Symptoms are chronic.   Onset was 6 to12 months.   Pertinent negative symptoms include no chest pain, no cough, no rash and no weakness.     Patient presents today as a follow up for hypotension. She is followed by Dr. Bay for ascending aortic aneurysm s/p ascending hemiarch replacement with AVR (Inspiris 25 mm) and myocardial bridge resection 6/18/2024 at [facility]. Her procedure was complicated by bleeding, afib and post procedure complete heart block status post pacemaker. Her last pacemaker interrogation was completed on 2/5 and revealed no arrhythmias. She was admitted to the hospital in March for syncope and received IVF and was started on midodrine 2.5mg TID that has now been titrated up to 10mg TID.    She presents today and reports her BP is all over the place. Her home BP log reflects readings that range from 70-130s with majority of recent readings being in the 90s-130s. She reports she does not feel bad with BP in the 90s. She has not taken midodrine at night as she was told per her pharmacist that her BP elevates at night while laying supine. She reports she did have an issue with elevated BP that woke her up from sleep. She has had no further syncope or near syncope since her midodrine was increased. She is no longer having chest pain, palpitations or dyspnea. " "    The following portions of the patient's history were reviewed and updated as appropriate: allergies, current medications, past family history, past medical history, past social history, past surgical history and problem list.    Allergies   Allergen Reactions    Clavulanic Acid Nausea And Vomiting    Gadolinium Other (See Comments)     \"passed out and was shaking all over. Had to spend the night at hospital\"  NAUSEA/POS SYNCOPE  \"passed out and was shaking all over. Had to spend the night at hospital\"  NAUSEA/POS SYNCOPE  \"passed out and was shaking all over. Had to spend the night at hospital\"  \"passed out and was shaking all over. Had to spend the night at hospital\"  NAUSEA/POS SYNCOPE  NAUSEA/POS SYNCOPE      Verapamil Hcl Er Anaphylaxis    Zoster Vac Recomb Adjuvanted Swelling    Flagyl [Metronidazole] Diarrhea     Abdominal pain     Arava [Leflunomide] Rash     Abnormal labs     Estradiol Rash    Hydroxychloroquine GI Intolerance     Abdominal pain     Metformin Unknown - Low Severity    Prednisone Other (See Comments)     Increase in BP    Protonix [Pantoprazole] Nausea Only and Myalgia    Tetracyclines & Related Rash    Trazodone Itching, Other (See Comments) and Unknown (See Comments)     Blurred vision  Blurred vision  Blurred vision  Blurred vision         Current Outpatient Medications:     albuterol sulfate  (90 Base) MCG/ACT inhaler, Every 4 (Four) Hours As Needed., Disp: , Rfl:     aspirin 81 MG chewable tablet, Chew 1 tablet Daily., Disp: , Rfl:     Continuous Glucose Sensor (FreeStyle Aydin 2 Sensor) misc, , Disp: , Rfl:     eszopiclone (LUNESTA) 3 MG tablet, Take 1 tablet by mouth Every Night. Take immediately before bedtime, Disp: 90 tablet, Rfl: 1    ferrous sulfate 325 (65 FE) MG tablet, Take 1 tablet by mouth 3 (Three) Times a Week., Disp: 36 tablet, Rfl: 1    fluticasone (FLONASE) 50 MCG/ACT nasal spray, 2 sprays into the nostril(s) as directed by provider Daily. (Patient taking " differently: Administer 2 sprays into the nostril(s) as directed by provider Daily As Needed for Allergies.), Disp: 16 g, Rfl: 1    Galcanezumab-gnlm (Emgality) 120 MG/ML solution prefilled syringe, Inject 1 mL under the skin into the appropriate area as directed Every 30 (Thirty) Days., Disp: 1 mL, Rfl: 5    hydrOXYzine (ATARAX) 25 MG tablet, Take 1 tablet by mouth Every 6 (Six) Hours As Needed for Anxiety., Disp: 120 tablet, Rfl: 1    lansoprazole (PREVACID) 30 MG capsule, Take 1 capsule by mouth Daily., Disp: 90 capsule, Rfl: 3    levothyroxine (SYNTHROID, LEVOTHROID) 75 MCG tablet, Take 1 tablet by mouth 5 (Five) Times a Week. Monday, Tuesday, Thursday, Friday and Saturday, Disp: , Rfl:     linaclotide (Linzess) 145 MCG capsule capsule, Take 1 capsule by mouth Every Morning Before Breakfast., Disp: 90 capsule, Rfl: 1    magnesium oxide (MAG-OX) 400 MG tablet, Take 1 tablet by mouth Daily., Disp: 90 tablet, Rfl: 1    midodrine (PROAMATINE) 2.5 MG tablet, Take 1 tablet by mouth At Night As Needed (low bp)., Disp: 90 tablet, Rfl: 3    mycophenolate (CELLCEPT) 500 MG tablet, Take 1 tablet by mouth Daily., Disp: 90 tablet, Rfl: 1    Narcan 4 MG/0.1ML nasal spray, Administer 1 spray into the nostril(s) as directed by provider As Needed for Opioid Reversal., Disp: , Rfl:     nitroglycerin (NITROSTAT) 0.4 MG SL tablet, Place 1 tablet under the tongue Every 5 (Five) Minutes As Needed for Chest Pain. Take no more than 3 doses in 15 minutes., Disp: 100 tablet, Rfl: 2    ondansetron ODT (ZOFRAN-ODT) 4 MG disintegrating tablet, Place 1 tablet on the tongue Every 8 (Eight) Hours As Needed for Nausea or Vomiting., Disp: , Rfl:     PARoxetine (Paxil) 40 MG tablet, Take 1 tablet by mouth Every Morning., Disp: 90 tablet, Rfl: 1    pilocarpine (SALAGEN) 5 MG tablet, Take 1 tablet by mouth 3 (Three) Times a Day., Disp: , Rfl:     potassium chloride (KLOR-CON M10) 10 MEQ CR tablet, Take 1 tablet by mouth 2 (Two) Times a Day., Disp:  180 tablet, Rfl: 0    promethazine (PHENERGAN) 25 MG tablet, Take 1 tablet by mouth Every 6 (Six) Hours As Needed for Nausea or Vomiting. (Patient taking differently: Take 1 tablet by mouth 3 (Three) Times a Day Before Meals.), Disp: , Rfl:     ranolazine (Ranexa) 1000 MG 12 hr tablet, Take 1 tablet by mouth 2 (Two) Times a Day., Disp: 60 tablet, Rfl: 11    Semaglutide, 1 MG/DOSE, (OZEMPIC) 4 MG/3ML solution pen-injector, Inject 1 mg under the skin into the appropriate area as directed 1 (One) Time Per Week., Disp: 3 mL, Rfl: 0    ubrogepant (UBRELVY) 100 MG tablet, Take 1 tablet at the onset of headache. May repeat dose x1 in 2 hours if headache is not resolved. Max dose 200 mg/24 hours., Disp: 30 tablet, Rfl: 2    midodrine (PROAMATINE) 10 MG tablet, Take 1 tablet by mouth 2 (Two) Times a Day Before Meals., Disp: 60 tablet, Rfl: 11  Past Medical History:   Diagnosis Date    AAA (abdominal aortic aneurysm)     Abnormal ECG 03/14/2023    Allergic     Anemia     Angina pectoris     Anxiety     Anxiety and depression 06/25/2024    Aortic valve replaced 07/29/2024    Arthritis     Asthma     Bicuspid aortic valve 03/14/2023    Cataract     Surgery 2022    Cholelithiasis     Chronic kidney disease     Clotting disorder     Seeing Leticia Flores at [facility] currently    Colon polyp     Coronary artery disease     Deep vein thrombosis     Dental disease     Diabetes mellitus     Diverticulitis of colon     Diverticulosis     GERD (gastroesophageal reflux disease)     Hashimoto's thyroiditis     In [facility]    Headache     Heart murmur     Hernia     History of medical problems     Aortic aneurysms    HL (hearing loss)     Hypernatremia     In [facility]    Hypertension     Hyperthyroidism     Hypoglycemia     In [facility]    Hypothyroid     Ingrown toenail     Irritable bowel syndrome     Kidney stone     Previous haf lithotripsy    Low back pain     Lupus     Mitral valve prolapse     In the [facility] Dr. [place]  [place]    Mixed hyperlipidemia 11/18/2024    Neuromuscular disorder     NSTEMI (non-ST elevated myocardial infarction) 12/26/2023    Obesity     Osteopenia     Pancreatitis     Peptic ulcer disease     Peptic ulceration     Plantar fasciitis     Past    Pneumonia     RBBB 03/14/2023    Scoliosis     Sinusitis     Sjogren's disease     Sleep apnea     Years ago, but no longer have since Gastric Sleeve Surgery    Tinnitus     Ulcerative colitis     Urinary tract infection     Visual impairment     Vitamin D deficiency     In MyChart. I take Vit D daily       Social History     Socioeconomic History    Marital status:    Tobacco Use    Smoking status: Former     Current packs/day: 0.25     Average packs/day: 0.3 packs/day for 43.9 years (11.0 ttl pk-yrs)     Types: Cigarettes     Start date: 1/1/1991     Quit date: 1/1/1987     Passive exposure: Past    Smokeless tobacco: Never    Tobacco comments:     [id number] for 6 months only   Vaping Use    Vaping status: Never Used   Substance and Sexual Activity    Alcohol use: Not Currently     Comment: Socially very rare    Drug use: Never    Sexual activity: Yes     Partners: Male     Birth control/protection: None, Partner of same sex     Comment: Thats one male patner i live with       Review of Systems   Constitutional: Negative for malaise/fatigue, weight gain and weight loss.   Cardiovascular:  Negative for chest pain, dyspnea on exertion, irregular heartbeat, leg swelling, near-syncope, orthopnea, palpitations, paroxysmal nocturnal dyspnea and syncope.   Respiratory:  Negative for cough, shortness of breath, sleep disturbances due to breathing, sputum production and wheezing.    Skin:  Negative for dry skin, flushing, itching and rash.   Gastrointestinal:  Negative for hematemesis and hematochezia.   Neurological:  Negative for dizziness, light-headedness, loss of balance and weakness.   All other systems reviewed and are negative.         Objective:     Vitals  "reviewed.   Constitutional:       General: Not in acute distress.     Appearance: Healthy appearance. Well-developed. Not diaphoretic.   Eyes:      General: No scleral icterus.     Conjunctiva/sclera: Conjunctivae normal.      Pupils: Pupils are equal, round, and reactive to light.   HENT:      Head: Normocephalic.    Mouth/Throat:      Pharynx: No oropharyngeal exudate.   Neck:      Vascular: No JVR.   Pulmonary:      Effort: Pulmonary effort is normal. No respiratory distress.      Breath sounds: Normal breath sounds. No wheezing. No rhonchi. No rales.   Chest:      Chest wall: Not tender to palpatation.   Cardiovascular:      Normal rate. Regular rhythm.      Murmurs: There is a grade 2/6 systolic murmur.   Pulses:     Intact distal pulses.   Edema:     Peripheral edema absent.   Abdominal:      General: Bowel sounds are normal. There is no distension.      Palpations: Abdomen is soft.      Tenderness: There is no abdominal tenderness.   Musculoskeletal: Normal range of motion.      Cervical back: Normal range of motion and neck supple. Skin:     General: Skin is warm and dry.      Coloration: Skin is not pale.      Findings: No erythema or rash.   Neurological:      Mental Status: Alert, oriented to person, place, and time and oriented to person, place and time.      Deep Tendon Reflexes: Reflexes are normal and symmetric.   Psychiatric:         Behavior: Behavior normal.           Procedures  /62 (BP Location: Right arm, Patient Position: Sitting, Cuff Size: Adult)   Pulse 80   Ht 167.6 cm (66\")   Wt 71.4 kg (157 lb 6.4 oz)   SpO2 99%   BMI 25.41 kg/m²     Lab Review:   I have reviewed previous office notes, device interrogations, recent labs and recent cardiac testing.     2/5/25:      Echo 6/2024:          Assessment:          Diagnosis Plan   1. Orthostasis        2. S/P AVR (aortic valve replacement)        3. Aneurysm of ascending aorta without rupture        4. Myocardial bridge        5. " Presence of cardiac pacemaker                   Plan:       1 Orthostasis- new script for 10mg Midodrine to be taken BID, sent to pharmacy. I am also sending in 2.5mg Midodrine to take PRN at night pending BP and symptoms if she wakes up in the the middle of the night. She does not take Midodrine if her SBP is >110.  2. S/p AVR- due to bicuspid valve. echo on 3/24/25 made no mention of abnormal valve function.   3. Aneurysm of ascending aorta- s/p replacement at time of AVR.   4. Myocardial bridging- of mid LAD per cath in 12/2023. No current complaints of chest pain.  Avoid Nitro  5. Pacemaker- normal device function per interrogation on 2/5.       Follow up in 6 weeks or sooner if symptoms worsen. .     I spent 30 minutes caring for Kelli on this date of service. This time includes time spent by me in the following activities:preparing for the visit, reviewing tests, obtaining and/or reviewing a separately obtained history, performing a medically appropriate examination and/or evaluation , counseling and educating the patient/family/caregiver, ordering medications, tests, or procedures, documenting information in the medical record, and care coordination

## 2025-05-01 ENCOUNTER — OFFICE VISIT (OUTPATIENT)
Dept: GASTROENTEROLOGY | Facility: CLINIC | Age: 60
End: 2025-05-01
Payer: COMMERCIAL

## 2025-05-01 ENCOUNTER — HOSPITAL ENCOUNTER (OUTPATIENT)
Dept: CARDIOLOGY | Facility: HOSPITAL | Age: 60
Discharge: HOME | End: 2025-05-01
Payer: COMMERCIAL

## 2025-05-01 ENCOUNTER — LAB (OUTPATIENT)
Dept: LAB | Facility: HOSPITAL | Age: 60
End: 2025-05-01
Payer: COMMERCIAL

## 2025-05-01 VITALS
HEART RATE: 63 BPM | WEIGHT: 159 LBS | SYSTOLIC BLOOD PRESSURE: 118 MMHG | TEMPERATURE: 96.9 F | HEIGHT: 66 IN | OXYGEN SATURATION: 100 % | DIASTOLIC BLOOD PRESSURE: 70 MMHG | BODY MASS INDEX: 25.55 KG/M2

## 2025-05-01 DIAGNOSIS — Z78.9 NONSMOKER: ICD-10-CM

## 2025-05-01 DIAGNOSIS — R74.8 ELEVATED ALKALINE PHOSPHATASE LEVEL: ICD-10-CM

## 2025-05-01 DIAGNOSIS — R06.09 DOE (DYSPNEA ON EXERTION): ICD-10-CM

## 2025-05-01 DIAGNOSIS — R74.8 ELEVATED ALKALINE PHOSPHATASE LEVEL: Primary | ICD-10-CM

## 2025-05-01 LAB
ALBUMIN SERPL-MCNC: 4.1 G/DL (ref 3.5–5.2)
ALBUMIN/GLOB SERPL: 1.7 G/DL
ALP SERPL-CCNC: 100 U/L (ref 39–117)
ALT SERPL W P-5'-P-CCNC: 14 U/L (ref 1–33)
ANION GAP SERPL CALCULATED.3IONS-SCNC: 10 MMOL/L (ref 5–15)
AST SERPL-CCNC: 18 U/L (ref 1–32)
BILIRUB SERPL-MCNC: 0.3 MG/DL (ref 0–1.2)
BUN SERPL-MCNC: 11 MG/DL (ref 6–20)
BUN/CREAT SERPL: 13.6 (ref 7–25)
CALCIUM SPEC-SCNC: 8.6 MG/DL (ref 8.6–10.5)
CHLORIDE SERPL-SCNC: 101 MMOL/L (ref 98–107)
CO2 SERPL-SCNC: 27 MMOL/L (ref 22–29)
CREAT SERPL-MCNC: 0.81 MG/DL (ref 0.57–1)
EGFRCR SERPLBLD CKD-EPI 2021: 83.7 ML/MIN/1.73
FERRITIN SERPL-MCNC: 164.1 NG/ML (ref 13–150)
GLOBULIN UR ELPH-MCNC: 2.4 GM/DL
GLUCOSE SERPL-MCNC: 92 MG/DL (ref 65–99)
HAV IGM SERPL QL IA: NORMAL
HBV SURFACE AG SERPL QL IA: NORMAL
HCV AB SER QL: NORMAL
IRON 24H UR-MRATE: 68 MCG/DL (ref 37–145)
IRON SATN MFR SERPL: 22 % (ref 20–50)
POTASSIUM SERPL-SCNC: 4.2 MMOL/L (ref 3.5–5.2)
PROT SERPL-MCNC: 6.5 G/DL (ref 6–8.5)
SODIUM SERPL-SCNC: 138 MMOL/L (ref 136–145)
TIBC SERPL-MCNC: 314 MCG/DL (ref 298–536)
TRANSFERRIN SERPL-MCNC: 211 MG/DL (ref 200–360)

## 2025-05-01 PROCEDURE — 86803 HEPATITIS C AB TEST: CPT

## 2025-05-01 PROCEDURE — 36415 COLL VENOUS BLD VENIPUNCTURE: CPT

## 2025-05-01 PROCEDURE — 34310000005 TECHNETIUM OXIDRONATE KIT: Performed by: INTERNAL MEDICINE

## 2025-05-01 PROCEDURE — 84466 ASSAY OF TRANSFERRIN: CPT

## 2025-05-01 PROCEDURE — A9561 TC99M OXIDRONATE: HCPCS | Performed by: INTERNAL MEDICINE

## 2025-05-01 PROCEDURE — 82390 ASSAY OF CERULOPLASMIN: CPT

## 2025-05-01 PROCEDURE — 86709 HEPATITIS A IGM ANTIBODY: CPT

## 2025-05-01 PROCEDURE — 82104 ALPHA-1-ANTITRYPSIN PHENO: CPT

## 2025-05-01 PROCEDURE — 99214 OFFICE O/P EST MOD 30 MIN: CPT | Performed by: CLINICAL NURSE SPECIALIST

## 2025-05-01 PROCEDURE — 86225 DNA ANTIBODY NATIVE: CPT

## 2025-05-01 PROCEDURE — 80053 COMPREHEN METABOLIC PANEL: CPT

## 2025-05-01 PROCEDURE — 83540 ASSAY OF IRON: CPT

## 2025-05-01 PROCEDURE — 84080 ASSAY ALKALINE PHOSPHATASES: CPT

## 2025-05-01 PROCEDURE — 86015 ACTIN ANTIBODY EACH: CPT

## 2025-05-01 PROCEDURE — 86038 ANTINUCLEAR ANTIBODIES: CPT

## 2025-05-01 PROCEDURE — 86381 MITOCHONDRIAL ANTIBODY EACH: CPT

## 2025-05-01 PROCEDURE — 82103 ALPHA-1-ANTITRYPSIN TOTAL: CPT

## 2025-05-01 PROCEDURE — 86706 HEP B SURFACE ANTIBODY: CPT

## 2025-05-01 PROCEDURE — 78803 RP LOCLZJ TUM SPECT 1 AREA: CPT

## 2025-05-01 PROCEDURE — 84075 ASSAY ALKALINE PHOSPHATASE: CPT

## 2025-05-01 PROCEDURE — 87340 HEPATITIS B SURFACE AG IA: CPT

## 2025-05-01 PROCEDURE — 82728 ASSAY OF FERRITIN: CPT

## 2025-05-01 RX ADMIN — TECHNETIUM TC 99M OXIDRONATE 1 DOSE: 3.15 INJECTION, POWDER, LYOPHILIZED, FOR SOLUTION INTRAVENOUS at 08:00

## 2025-05-01 NOTE — DISCHARGE INSTRUCTIONS
The urgent care provider has already called in use some medications for your pain.   Attending Attestation (For Attendings USE Only)...

## 2025-05-01 NOTE — PROGRESS NOTES
"Kelli Pedro  1965    5/1/2025  Chief Complaint   Patient presents with    GI Problem     Follow up visit for elevated labs     Subjective   HPI  Kelli Pedor is a 59 y.o. female who presents with a complaint of elevated liver enzymes. 10/7/24 ALT 51 AST 70 ALKP 185. T bili normal. Albumin normal. No alcohol regularly. Ultrasound of the liver was performed. ALKP on 3/27/25 is 121. No associated symptoms. No fatty liver.    Ultrasound of the liver IMPRESSION:  1. RIGHT kidney with mild hydronephrosis versus small extrarenal pelvis.  Recommend correlation with patient symptoms. CT may be useful.  2. Normal liver echogenicity and contour.     Exam was tagged in PACS with \"incidental findings\" to assist in  appropriate follow up.      This report was signed and finalized on 4/1/2025 10:22 AM by Dr Rachel Valderrama MD.  Past Medical History:   Diagnosis Date    AAA (abdominal aortic aneurysm)     Abnormal ECG 03/14/2023    Allergic     Anemia     Angina pectoris     Anxiety     Anxiety and depression 06/25/2024    Aortic valve replaced 07/29/2024    Arthritis     Asthma     Bicuspid aortic valve 03/14/2023    Cataract     Surgery 2022    Cholelithiasis     Chronic kidney disease     Clotting disorder     Seeing Leticia Flores at [facility] currently    Colon polyp     Coronary artery disease     Deep vein thrombosis     Dental disease     Diabetes mellitus     Diverticulitis of colon     Diverticulosis     GERD (gastroesophageal reflux disease)     Hashimoto's thyroiditis     In [facility]    Headache     Heart murmur     Hernia     History of medical problems     Aortic aneurysms    HL (hearing loss)     Hypernatremia     In [facility]    Hypertension     Hyperthyroidism     Hypoglycemia     In [facility]    Hypothyroid     Ingrown toenail     Irritable bowel syndrome     Kidney stone     Previous haf lithotripsy    Low back pain     Lupus     Mitral valve prolapse     In the [facility] Dr. Claudio Gibson "    Mixed hyperlipidemia 11/18/2024    Neuromuscular disorder     NSTEMI (non-ST elevated myocardial infarction) 12/26/2023    Obesity     Osteopenia     Pancreatitis     Peptic ulcer disease     Peptic ulceration     Plantar fasciitis     Past    Pneumonia     RBBB 03/14/2023    Scoliosis     Sinusitis     Sjogren's disease     Sleep apnea     Years ago, but no longer have since Gastric Sleeve Surgery    Tinnitus     Ulcerative colitis     Urinary tract infection     Visual impairment     Vitamin D deficiency     In MyChart. I take Vit D daily     Past Surgical History:   Procedure Laterality Date    ABDOMINAL AORTIC ANEURYSM REPAIR  06/18/2024    ABDOMINAL SURGERY      ANKLE SURGERY      AORTIC VALVE REPAIR/REPLACEMENT  06/2024    AORTIC VALVE SURGERY      ARTERIAL ANEURYSM REPAIR      ASCENDING ARCH/HEMIARCH REPLACEMENT  06/18/2024    BACK SURGERY      BARIATRIC SURGERY      CARDIAC CATHETERIZATION N/A 12/26/2023    Procedure: Left Heart Cath;  Surgeon: Pablito De Leon DO;  Location:  [facility] C[facility];  Service: Cardiovascular;  Laterality: N/A;    CARDIAC CATHETERIZATION  12/26/2023    CARDIAC SURGERY  06/2024    CARDIAC VALVE REPLACEMENT  06/2024    CARPAL TUNNEL RELEASE Bilateral     CHOLECYSTECTOMY      COLONOSCOPY  04/24/2023    normal colon consistent with ibs    ENDOSCOPY  04/24/2023    large hitial hernia,chronic gastritis    EYE SURGERY  2022    Cataracts both eyes    FLEXIBLE SIGMOIDOSCOPY      FOOT SURGERY      GALLBLADDER SURGERY      GASTRIC RESTRICTION SURGERY  2017    HYSTERECTOMY      INSERT / REPLACE / REMOVE PACEMAKER      KIDNEY STONE SURGERY      LITHOTRIPSY      PACEMAKER IMPLANTATION  06/2024    SHOULDER SURGERY      SUBTOTAL HYSTERECTOMY      TOENAIL EXCISION      TRICUSPID VALVE REPLACEMENT      TUBAL ABDOMINAL LIGATION      UPPER GASTROINTESTINAL ENDOSCOPY      VEIN SURGERY      [facility]       Outpatient Medications Marked as Taking for the  5/1/25 encounter (Office Visit) with Jennifer Rosa APRN   Medication Sig Dispense Refill    albuterol sulfate  (90 Base) MCG/ACT inhaler Every 4 (Four) Hours As Needed.      aspirin 81 MG chewable tablet Chew 1 tablet Daily.      Continuous Glucose Sensor (FreeStyle Aydin 2 Sensor) misc       eszopiclone (LUNESTA) 3 MG tablet Take 1 tablet by mouth Every Night. Take immediately before bedtime 90 tablet 1    ferrous sulfate 325 (65 FE) MG tablet Take 1 tablet by mouth 3 (Three) Times a Week. 36 tablet 1    fluticasone (FLONASE) 50 MCG/ACT nasal spray 2 sprays into the nostril(s) as directed by provider Daily. (Patient taking differently: Administer 2 sprays into the nostril(s) as directed by provider Daily As Needed for Allergies.) 16 g 1    Galcanezumab-gnlm (Emgality) 120 MG/ML solution prefilled syringe Inject 1 mL under the skin into the appropriate area as directed Every 30 (Thirty) Days. 1 mL 5    hydrOXYzine (ATARAX) 25 MG tablet Take 1 tablet by mouth Every 6 (Six) Hours As Needed for Anxiety. 120 tablet 1    lansoprazole (PREVACID) 30 MG capsule Take 1 capsule by mouth Daily. 90 capsule 3    levothyroxine (SYNTHROID, LEVOTHROID) 75 MCG tablet Take 1 tablet by mouth 5 (Five) Times a Week. Monday, Tuesday, Thursday, Friday and Saturday      linaclotide (Linzess) 145 MCG capsule capsule Take 1 capsule by mouth Every Morning Before Breakfast. 90 capsule 1    magnesium oxide (MAG-OX) 400 MG tablet Take 1 tablet by mouth Daily. 90 tablet 1    midodrine (PROAMATINE) 10 MG tablet Take 1 tablet by mouth 2 (Two) Times a Day Before Meals. 60 tablet 11    midodrine (PROAMATINE) 2.5 MG tablet Take 1 tablet by mouth At Night As Needed (low bp). 90 tablet 3    mycophenolate (CELLCEPT) 500 MG tablet Take 1 tablet by mouth Daily. 90 tablet 1    Narcan 4 MG/0.1ML nasal spray Administer 1 spray into the nostril(s) as directed by provider As Needed for Opioid Reversal.      nitroglycerin (NITROSTAT) 0.4 MG SL  "tablet Place 1 tablet under the tongue Every 5 (Five) Minutes As Needed for Chest Pain. Take no more than 3 doses in 15 minutes. 100 tablet 2    ondansetron ODT (ZOFRAN-ODT) 4 MG disintegrating tablet Place 1 tablet on the tongue Every 8 (Eight) Hours As Needed for Nausea or Vomiting.      PARoxetine (Paxil) 40 MG tablet Take 1 tablet by mouth Every Morning. 90 tablet 1    pilocarpine (SALAGEN) 5 MG tablet Take 1 tablet by mouth 3 (Three) Times a Day.      potassium chloride (KLOR-CON M10) 10 MEQ CR tablet Take 1 tablet by mouth 2 (Two) Times a Day. 180 tablet 0    promethazine (PHENERGAN) 25 MG tablet Take 1 tablet by mouth Every 6 (Six) Hours As Needed for Nausea or Vomiting. (Patient taking differently: Take 1 tablet by mouth 3 (Three) Times a Day Before Meals.)      ranolazine (Ranexa) 1000 MG 12 hr tablet Take 1 tablet by mouth 2 (Two) Times a Day. 60 tablet 11    Semaglutide, 1 MG/DOSE, (OZEMPIC) 4 MG/3ML solution pen-injector Inject 1 mg under the skin into the appropriate area as directed 1 (One) Time Per Week. 3 mL 0    ubrogepant (UBRELVY) 100 MG tablet Take 1 tablet at the onset of headache. May repeat dose x1 in 2 hours if headache is not resolved. Max dose 200 mg/24 hours. 30 tablet 2     Allergies   Allergen Reactions    Clavulanic Acid Nausea And Vomiting    Gadolinium Other (See Comments)     \"passed out and was shaking all over. Had to spend the night at hospital\"  NAUSEA/POS SYNCOPE  \"passed out and was shaking all over. Had to spend the night at hospital\"  NAUSEA/POS SYNCOPE  \"passed out and was shaking all over. Had to spend the night at hospital\"  \"passed out and was shaking all over. Had to spend the night at hospital\"  NAUSEA/POS SYNCOPE  NAUSEA/POS SYNCOPE      Verapamil Hcl Er Anaphylaxis    Zoster Vac Recomb Adjuvanted Swelling    Flagyl [Metronidazole] Diarrhea     Abdominal pain     Arava [Leflunomide] Rash     Abnormal labs     Estradiol Rash    Hydroxychloroquine GI Intolerance     " Abdominal pain     Metformin Unknown - Low Severity    Prednisone Other (See Comments)     Increase in BP    Protonix [Pantoprazole] Nausea Only and Myalgia    Tetracyclines & Related Rash    Trazodone Itching, Other (See Comments) and Unknown (See Comments)     Blurred vision  Blurred vision  Blurred vision  Blurred vision       Social History     Socioeconomic History    Marital status:    Tobacco Use    Smoking status: Former     Current packs/day: 0.25     Average packs/day: 0.2 packs/day for 43.9 years (11.0 ttl pk-yrs)     Types: Cigarettes     Start date: 1/1/1991     Quit date: 1/1/1987     Passive exposure: Past    Smokeless tobacco: Never    Tobacco comments:     [id number] for 6 months only   Vaping Use    Vaping status: Never Used   Substance and Sexual Activity    Alcohol use: Not Currently     Comment: Socially very rare    Drug use: Never    Sexual activity: Yes     Partners: Male     Birth control/protection: None, Partner of same sex     Comment: Thats one male patner i live with     Family History   Problem Relation Age of Onset    Breast cancer Mother 70    Diabetes Mother     Hearing loss Mother     Heart disease Mother     Hyperlipidemia Mother     Vision loss Mother     Clotting disorder Mother     Fainting Mother     Hypertension Mother     Cancer Mother     Migraines Mother     Inflammatory bowel disease Mother     Scleroderma Father     Diabetes Father     Hearing loss Father     Heart disease Father     Hyperlipidemia Father     Vision loss Father     Clotting disorder Father     Fainting Father     Heart attack Father     Hypertension Father     Cancer Father     Stroke Father     Heart valve disorder Father     Colon polyps Father     Inflammatory bowel disease Father     Ulcerative colitis Father     Obesity Father     Diabetes Sister     Hyperlipidemia Sister     Vision loss Sister     Asthma Sister     Hypertension Sister     Thyroid disease Sister     Obesity Sister      Hyperlipidemia Brother     Vision loss Brother     Asthma Brother     Hypertension Brother     Diabetes Brother     Early death Brother     Obesity Brother     Colon cancer Neg Hx      Health Maintenance   Topic Date Due    DIABETIC EYE EXAM  Never done    COVID-19 Vaccine (6 - 2024-25 season) 09/01/2024    HEMOGLOBIN A1C  06/19/2025    INFLUENZA VACCINE  07/01/2025    DIABETIC FOOT EXAM  07/25/2025    ANNUAL PHYSICAL  03/25/2026    LIPID PANEL  03/25/2026    URINE MICROALBUMIN-CREATININE RATIO (uACR)  03/25/2026    MAMMOGRAM  12/20/2026    COLORECTAL CANCER SCREENING  04/24/2033    TDAP/TD VACCINES (3 - Td or Tdap) 05/21/2033    HEPATITIS C SCREENING  Completed    Hepatitis B  Completed    Pneumococcal Vaccine 50+  Completed     Review of Systems   Constitutional:  Negative for activity change, appetite change, chills, diaphoresis, fatigue, fever and unexpected weight change.   HENT:  Negative for ear pain, hearing loss, mouth sores, sore throat, trouble swallowing and voice change.    Eyes: Negative.    Respiratory:  Negative for cough, choking, shortness of breath and wheezing.    Cardiovascular:  Negative for chest pain and palpitations.   Gastrointestinal:  Negative for abdominal pain, blood in stool, constipation, diarrhea, nausea and vomiting.   Endocrine: Negative for cold intolerance and heat intolerance.   Genitourinary:  Negative for decreased urine volume, dysuria, frequency, hematuria and urgency.   Musculoskeletal:  Negative for back pain, gait problem and myalgias.   Skin:  Negative for color change, pallor and rash.   Allergic/Immunologic: Negative for food allergies and immunocompromised state.   Neurological:  Negative for dizziness, tremors, seizures, syncope, weakness, light-headedness, numbness and headaches.   Hematological:  Negative for adenopathy. Does not bruise/bleed easily.   Psychiatric/Behavioral:  Negative for agitation and confusion. The patient is not nervous/anxious.    All other  "systems reviewed and are negative.    Objective   Vitals:    05/01/25 1238   BP: 118/70   Pulse: 63   Temp: 96.9 °F (36.1 °C)   SpO2: 100%   Weight: 72.1 kg (159 lb)   Height: 167.6 cm (66\")     Body mass index is 25.66 kg/m².  Physical Exam  Constitutional:       Appearance: She is well-developed.   HENT:      Head: Normocephalic and atraumatic.   Eyes:      Pupils: Pupils are equal, round, and reactive to light.   Neck:      Trachea: No tracheal deviation.   Cardiovascular:      Rate and Rhythm: Normal rate and regular rhythm.      Heart sounds: Normal heart sounds. No murmur heard.     No friction rub. No gallop.   Pulmonary:      Effort: Pulmonary effort is normal. No respiratory distress.      Breath sounds: Normal breath sounds. No wheezing or rales.   Chest:      Chest wall: No tenderness.   Abdominal:      General: Bowel sounds are normal. There is no distension.      Palpations: Abdomen is soft. Abdomen is not rigid.      Tenderness: There is no abdominal tenderness. There is no guarding or rebound.   Musculoskeletal:         General: No tenderness or deformity. Normal range of motion.      Cervical back: Normal range of motion and neck supple.   Skin:     General: Skin is warm and dry.      Coloration: Skin is not pale.      Findings: No rash.   Neurological:      Mental Status: She is alert and oriented to person, place, and time.      Deep Tendon Reflexes: Reflexes are normal and symmetric.   Psychiatric:         Behavior: Behavior normal.         Thought Content: Thought content normal.         Judgment: Judgment normal.       Assessment & Plan   Diagnoses and all orders for this visit:    1. Elevated alkaline phosphatase level (Primary)  -     Alpha - 1 - Antitrypsin Phenotype; Future  -     STEVE; Future  -     Anti-Smooth Muscle Antibody Titer; Future  -     Ceruloplasmin; Future  -     Ferritin; Future  -     Hepatitis A Antibody, IgM; Future  -     Hepatitis B Surface Antibody; Future  -     " Hepatitis B Surface Antigen; Future  -     Hepatitis C Antibody; Future  -     Mitochondrial Antibodies, M2; Future  -     Iron Profile; Future  -     Alkaline Phosphatase, Isoenzymes; Future  -     Comprehensive Metabolic Panel; Future    2. Nonsmoker      Ultrasound reviewed  Will get labs to rule out other    Part of this note may be an electronic transcription/translation of spoken language to printed text using the Dragon Dictation System.  Body mass index is 25.66 kg/m².  No follow-ups on file.           All risks, benefits, alternatives, and indications of colonoscopy and/or Endoscopy procedure have been discussed with the patient. Risks to include perforation of the colon requiring possible surgery or colostomy, risk of bleeding from biopsies or removal of colon tissue, possibility of missing a colon polyp or cancer, or adverse drug reaction.  Benefits to include the diagnosis and management of disease of the colon and rectum. Alternatives to include barium enema, radiographic evaluation, lab testing or no intervention. Pt verbalizes understanding and agrees.     Jennifer Rosa, APRN  5/1/2025  13:22 CDT          If you smoke or use tobacco, 4 minutes reading provided  Steps to Quit Smoking  Smoking tobacco can be harmful to your health and can affect almost every organ in your body. Smoking puts you, and those around you, at risk for developing many serious chronic diseases. Quitting smoking is difficult, but it is one of the best things that you can do for your health. It is never too late to quit.  What are the benefits of quitting smoking?  When you quit smoking, you lower your risk of developing serious diseases and conditions, such as:  Lung cancer or lung disease, such as COPD.  Heart disease.  Stroke.  Heart attack.  Infertility.  Osteoporosis and bone fractures.  Additionally, symptoms such as coughing, wheezing, and shortness of breath may get better when you quit. You may also find that you  get sick less often because your body is stronger at fighting off colds and infections. If you are pregnant, quitting smoking can help to reduce your chances of having a baby of low birth weight.  How do I get ready to quit?  When you decide to quit smoking, create a plan to make sure that you are successful. Before you quit:  Pick a date to quit. Set a date within the next two weeks to give you time to prepare.  Write down the reasons why you are quitting. Keep this list in places where you will see it often, such as on your bathroom mirror or in your car or wallet.  Identify the people, places, things, and activities that make you want to smoke (triggers) and avoid them. Make sure to take these actions:  Throw away all cigarettes at home, at work, and in your car.  Throw away smoking accessories, such as ashtrays and lighters.  Clean your car and make sure to empty the ashtray.  Clean your home, including curtains and carpets.  Tell your family, friends, and coworkers that you are quitting. Support from your loved ones can make quitting easier.  Talk with your health care provider about your options for quitting smoking.  Find out what treatment options are covered by your health insurance.  What strategies can I use to quit smoking?  Talk with your healthcare provider about different strategies to quit smoking. Some strategies include:  Quitting smoking altogether instead of gradually lessening how much you smoke over a period of time. Research shows that quitting “cold turkey” is more successful than gradually quitting.  Attending in-person counseling to help you build problem-solving skills. You are more likely to have success in quitting if you attend several counseling sessions. Even short sessions of 10 minutes can be effective.  Finding resources and support systems that can help you to quit smoking and remain smoke-free after you quit. These resources are most helpful when you use them often. They can  include:  Online chats with a counselor.  Telephone quitlines.  Printed self-help materials.  Support groups or group counseling.  Text messaging programs.  Mobile phone applications.  Taking medicines to help you quit smoking. (If you are pregnant or breastfeeding, talk with your health care provider first.) Some medicines contain nicotine and some do not. Both types of medicines help with cravings, but the medicines that include nicotine help to relieve withdrawal symptoms. Your health care provider may recommend:  Nicotine patches, gum, or lozenges.  Nicotine inhalers or sprays.  Non-nicotine medicine that is taken by mouth.  Talk with your health care provider about combining strategies, such as taking medicines while you are also receiving in-person counseling. Using these two strategies together makes you more likely to succeed in quitting than if you used either strategy on its own.  If you are pregnant or breastfeeding, talk with your health care provider about finding counseling or other support strategies to quit smoking. Do not take medicine to help you quit smoking unless told to do so by your health care provider.  What things can I do to make it easier to quit?  Quitting smoking might feel overwhelming at first, but there is a lot that you can do to make it easier. Take these important actions:  Reach out to your family and friends and ask that they support and encourage you during this time. Call telephone quitlines, reach out to support groups, or work with a counselor for support.  Ask people who smoke to avoid smoking around you.  Avoid places that trigger you to smoke, such as bars, parties, or smoke-break areas at work.  Spend time around people who do not smoke.  Lessen stress in your life, because stress can be a smoking trigger for some people. To lessen stress, try:  Exercising regularly.  Deep-breathing exercises.  Yoga.  Meditating.  Performing a body scan. This involves closing your eyes,  scanning your body from head to toe, and noticing which parts of your body are particularly tense. Purposefully relax the muscles in those areas.  Download or purchase mobile phone or tablet apps (applications) that can help you stick to your quit plan by providing reminders, tips, and encouragement. There are many free apps, such as QuitGuide from the CDC (Centers for Disease Control and Prevention). You can find other support for quitting smoking (smoking cessation) through smokefree.gov and other websites.  How will I feel when I quit smoking?  Within the first 24 hours of quitting smoking, you may start to feel some withdrawal symptoms. These symptoms are usually most noticeable 2-3 days after quitting, but they usually do not last beyond 2-3 weeks. Changes or symptoms that you might experience include:  Mood swings.  Restlessness, anxiety, or irritation.  Difficulty concentrating.  Dizziness.  Strong cravings for sugary foods in addition to nicotine.  Mild weight gain.  Constipation.  Nausea.  Coughing or a sore throat.  Changes in how your medicines work in your body.  A depressed mood.  Difficulty sleeping (insomnia).  After the first 2-3 weeks of quitting, you may start to notice more positive results, such as:  Improved sense of smell and taste.  Decreased coughing and sore throat.  Slower heart rate.  Lower blood pressure.  Clearer skin.  The ability to breathe more easily.  Fewer sick days.  Quitting smoking is very challenging for most people. Do not get discouraged if you are not successful the first time. Some people need to make many attempts to quit before they achieve long-term success. Do your best to stick to your quit plan, and talk with your health care provider if you have any questions or concerns.  This information is not intended to replace advice given to you by your health care provider. Make sure you discuss any questions you have with your health care provider.  Document Released:  12/12/2002 Document Revised: 08/15/2017 Document Reviewed: 05/03/2016  Elsevier Interactive Patient Education © 2017 [employer]

## 2025-05-02 LAB
ALP BONE CFR SERPL: 22 % (ref 14–68)
ALP INTEST CFR SERPL: 17 % (ref 0–18)
ALP LIVER CFR SERPL: 61 % (ref 18–85)
ALP SERPL-CCNC: 99 IU/L (ref 44–121)
ANA SER QL: POSITIVE
CERULOPLASMIN SERPL-MCNC: 24 MG/DL (ref 19–39)
DSDNA AB SER-ACNC: <1 IU/ML (ref 0–9)
HBV SURFACE AB SER RIA-ACNC: REACTIVE
Lab: NORMAL
MITOCHONDRIA M2 IGG SER-ACNC: <20 UNITS (ref 0–20)
SMA IGG SER-ACNC: 2 UNITS (ref 0–19)

## 2025-05-05 DIAGNOSIS — F06.4 ANXIETY DISORDER DUE TO MEDICAL CONDITION: ICD-10-CM

## 2025-05-05 DIAGNOSIS — F51.04 PSYCHOPHYSIOLOGICAL INSOMNIA: ICD-10-CM

## 2025-05-05 RX ORDER — PAROXETINE 40 MG/1
40 TABLET, FILM COATED ORAL EVERY MORNING
Qty: 90 TABLET | Refills: 1 | Status: SHIPPED | OUTPATIENT
Start: 2025-05-05

## 2025-05-05 RX ORDER — ESZOPICLONE 3 MG/1
3 TABLET, FILM COATED ORAL NIGHTLY
Qty: 90 TABLET | Refills: 1 | Status: SHIPPED | OUTPATIENT
Start: 2025-05-05

## 2025-05-06 ENCOUNTER — DOCUMENTATION (OUTPATIENT)
Dept: CARDIOLOGY | Facility: CLINIC | Age: 60
End: 2025-05-06
Payer: COMMERCIAL

## 2025-05-06 LAB
A1AT PHENOTYP SERPL IFE: ABNORMAL
A1AT SERPL-MCNC: 97 MG/DL (ref 101–187)

## 2025-05-06 NOTE — PROGRESS NOTES
Called patient to discuss normal PYP scan and normal labs. She reports understanding. She also informed me that [facility] recommenced a cardiac MRI however we are not doing cardiac MRIs with cardiac device in situ until we have updated imaging.

## 2025-05-07 DIAGNOSIS — E11.9 TYPE 2 DIABETES MELLITUS WITHOUT COMPLICATION, WITHOUT LONG-TERM CURRENT USE OF INSULIN: Primary | ICD-10-CM

## 2025-05-07 RX ORDER — HYDROCHLOROTHIAZIDE 12.5 MG/1
CAPSULE ORAL DAILY
Qty: 2 EACH | Refills: 2 | Status: SHIPPED | OUTPATIENT
Start: 2025-05-07

## 2025-05-12 ENCOUNTER — TELEPHONE (OUTPATIENT)
Dept: INTERNAL MEDICINE | Facility: CLINIC | Age: 60
End: 2025-05-12
Payer: COMMERCIAL

## 2025-05-12 NOTE — TELEPHONE ENCOUNTER
Left VM for patient to call to reschedule office visit scheduled 6/30/2025 due to scheduling issue.   OK FOR HUB TO READ AND RESCHEDULE (30 MIN OV)

## 2025-05-13 ENCOUNTER — OFFICE VISIT (OUTPATIENT)
Dept: INTERNAL MEDICINE | Facility: CLINIC | Age: 60
End: 2025-05-13
Payer: COMMERCIAL

## 2025-05-13 ENCOUNTER — HOSPITAL ENCOUNTER (OUTPATIENT)
Dept: CT IMAGING | Facility: HOSPITAL | Age: 60
Discharge: HOME | End: 2025-05-13
Payer: COMMERCIAL

## 2025-05-13 ENCOUNTER — LAB (OUTPATIENT)
Dept: LAB | Facility: HOSPITAL | Age: 60
End: 2025-05-13
Payer: COMMERCIAL

## 2025-05-13 VITALS
HEIGHT: 66 IN | WEIGHT: 158 LBS | TEMPERATURE: 97.8 F | HEART RATE: 73 BPM | DIASTOLIC BLOOD PRESSURE: 76 MMHG | SYSTOLIC BLOOD PRESSURE: 108 MMHG | BODY MASS INDEX: 25.39 KG/M2 | OXYGEN SATURATION: 96 %

## 2025-05-13 DIAGNOSIS — R10.12 LEFT UPPER QUADRANT PAIN: ICD-10-CM

## 2025-05-13 DIAGNOSIS — R10.12 LEFT UPPER QUADRANT PAIN: Primary | ICD-10-CM

## 2025-05-13 LAB
ALBUMIN SERPL-MCNC: 4.1 G/DL (ref 3.5–5)
ALBUMIN/GLOB SERPL: 1.6 G/DL (ref 1.1–2.5)
ALP SERPL-CCNC: 74 U/L (ref 24–120)
ALT SERPL W P-5'-P-CCNC: 15 U/L (ref 0–35)
ANION GAP SERPL CALCULATED.3IONS-SCNC: 7 MMOL/L (ref 4–13)
AST SERPL-CCNC: 23 U/L (ref 7–45)
BILIRUB SERPL-MCNC: 0.6 MG/DL (ref 0.1–1)
BILIRUB UR QL STRIP: ABNORMAL
BUN SERPL-MCNC: 9 MG/DL (ref 5–21)
BUN/CREAT SERPL: 11.3
CALCIUM SPEC-SCNC: 8.9 MG/DL (ref 8.6–10.5)
CHLORIDE SERPL-SCNC: 100 MMOL/L (ref 98–110)
CLARITY UR: CLEAR
CO2 SERPL-SCNC: 27 MMOL/L (ref 24–31)
COLOR UR: ABNORMAL
CREAT SERPL-MCNC: 0.8 MG/DL (ref 0.5–1.4)
EGFRCR SERPLBLD CKD-EPI 2021: 85 ML/MIN/1.73
ERYTHROCYTE [DISTWIDTH] IN BLOOD BY AUTOMATED COUNT: 14.5 % (ref 12.3–15.4)
GLOBULIN UR ELPH-MCNC: 2.5 GM/DL
GLUCOSE SERPL-MCNC: 84 MG/DL (ref 65–99)
GLUCOSE UR STRIP-MCNC: NEGATIVE MG/DL
HCT VFR BLD AUTO: 39.4 % (ref 34–46.6)
HGB BLD-MCNC: 12.4 G/DL (ref 12–15.9)
HGB UR QL STRIP.AUTO: NEGATIVE
KETONES UR QL STRIP: NEGATIVE
LEUKOCYTE ESTERASE UR QL STRIP.AUTO: NEGATIVE
LIPASE SERPL-CCNC: 66 U/L (ref 23–203)
MCH RBC QN AUTO: 29.6 PG (ref 26.6–33)
MCHC RBC AUTO-ENTMCNC: 31.5 G/DL (ref 31.5–35.7)
MCV RBC AUTO: 94 FL (ref 79–97)
NITRITE UR QL STRIP: NEGATIVE
PH UR STRIP.AUTO: 6 [PH] (ref 5–8)
PLATELET # BLD AUTO: 214 10*3/MM3 (ref 140–450)
PMV BLD AUTO: 10 FL (ref 6–12)
POTASSIUM SERPL-SCNC: 3.9 MMOL/L (ref 3.5–5.3)
PROT SERPL-MCNC: 6.6 G/DL (ref 6.3–8.7)
PROT UR QL STRIP: ABNORMAL
RBC # BLD AUTO: 4.19 10*6/MM3 (ref 3.77–5.28)
SODIUM SERPL-SCNC: 134 MMOL/L (ref 135–145)
SP GR UR STRIP: >=1.03 (ref 1–1.03)
UROBILINOGEN UR QL STRIP: ABNORMAL
WBC NRBC COR # BLD AUTO: 5.1 10*3/MM3 (ref 3.4–10.8)

## 2025-05-13 PROCEDURE — 80053 COMPREHEN METABOLIC PANEL: CPT

## 2025-05-13 PROCEDURE — 83690 ASSAY OF LIPASE: CPT

## 2025-05-13 PROCEDURE — 81003 URINALYSIS AUTO W/O SCOPE: CPT

## 2025-05-13 PROCEDURE — 85027 COMPLETE CBC AUTOMATED: CPT

## 2025-05-13 PROCEDURE — 74176 CT ABD & PELVIS W/O CONTRAST: CPT

## 2025-05-13 PROCEDURE — 99214 OFFICE O/P EST MOD 30 MIN: CPT | Performed by: NURSE PRACTITIONER

## 2025-05-13 PROCEDURE — 36415 COLL VENOUS BLD VENIPUNCTURE: CPT

## 2025-05-13 NOTE — PROGRESS NOTES
Chief Complaint  Pain (L side abdominal pain below rib cage.)    Subjective        Kelli Pedro is a 59 y.o. female who presents today for evaluation of the above problems.    History of Present Illness  The patient is a 59-year-old female who presents for evaluation of abdominal pain.    She began experiencing constant left upper quadrant abdominal pain a few days ago, described as a knot-like sensation beneath her left rib cage. The pain radiates to back. She also reports diarrhea, which she anticipates will recur soon. Due to her iron supplementation, her stools are dark, but she has not observed any fatty substances or other changes in them. Denies nausea or vomiting. She reports no changes in urinary patterns. She has a history of pancreatitis, but the date of the last episode is unknown. During a recent chiropractic visit, she was informed that her spleen felt abnormal so she is convinced of a splenic issue. She is currently under the care of a gastroenterologist for liver-related issues, with a follow-up appointment scheduled in 6 months. I reviewed those records which shows she was referred for elevated alk phos which has since normalized. She experiences chills and occasional body aches, which intensify upon movement which started with the onset of the abdominal pain.  Deep breathing exacerbates her pain, and lying on the affected side at night is uncomfortable. She also reports increased gas and bloating.  She has a known allergy to IV contrast, which previously resulted in an emergency room visit. Had US liver on 4/1/25 which showed right kidney with mild hydronephrosis versus small extra renal pelvis. Liver was noted to have normal echogenicity and contour. She follows with urology as well.     She takes Ozempic every Saturday for blood sugar management.    Review of Systems - Negative except as noted per HPI  History obtained from the patient    Objective   Vital Signs:  /76 (BP Location:  "Right arm, Patient Position: Sitting, Cuff Size: Adult)   Pulse 73   Temp 97.8 °F (36.6 °C) (Temporal)   Ht 167.6 cm (65.98\")   Wt 71.7 kg (158 lb)   SpO2 96%   BMI 25.51 kg/m²   Estimated body mass index is 25.51 kg/m² as calculated from the following:    Height as of this encounter: 167.6 cm (65.98\").    Weight as of this encounter: 71.7 kg (158 lb).       Physical Exam  Vitals reviewed.   Constitutional:       General: She is not in acute distress.     Appearance: Normal appearance. She is not ill-appearing.   Neck:      Thyroid: No thyroid mass, thyromegaly or thyroid tenderness.      Trachea: Trachea and phonation normal.   Cardiovascular:      Rate and Rhythm: Normal rate and regular rhythm.      Pulses: Normal pulses.      Heart sounds: Normal heart sounds. No murmur heard.     No friction rub. No gallop.   Pulmonary:      Effort: Pulmonary effort is normal. No respiratory distress.      Breath sounds: Normal breath sounds. No wheezing.   Abdominal:      General: Bowel sounds are normal. There is no distension.      Palpations: Abdomen is soft. There is no hepatomegaly, splenomegaly or mass.      Tenderness:  in the left upper quadrant There is left CVA tenderness. There is no right CVA tenderness, guarding or rebound.   Musculoskeletal:      Cervical back: Normal range of motion and neck supple.   Lymphadenopathy:      Cervical: No cervical adenopathy.   Skin:     General: Skin is warm and dry.      Capillary Refill: Capillary refill takes less than 2 seconds.   Neurological:      General: No focal deficit present.      Mental Status: She is alert and oriented to person, place, and time.   Psychiatric:         Mood and Affect: Mood normal.         Behavior: Behavior normal.         Thought Content: Thought content normal.         Judgment: Judgment normal.          Result Review :  The following data was reviewed by: LAQUITA Leos on 05/13/2025:  Common labs          3/28/2025    00:08 " 3/28/2025    02:51 4/25/2025    06:49 5/1/2025    13:47   Common Labs   Glucose  76   92    BUN  12   11    Creatinine  0.84   0.81    Sodium  138   138    Potassium 3.9  4.0   4.2    Chloride  104   101    Calcium  8.6   8.6    Albumin   3.8  4.1    Total Bilirubin    0.3    Alkaline Phosphatase    100     99    AST (SGOT)    18    ALT (SGPT)    14    WBC  5.37      Hemoglobin  12.4      Hematocrit  37.0      Platelets  177        Data reviewed : Radiologic studies      US Liver (04/01/2025 08:59)    Office Visit with Jennifer Rosa APRN (05/01/2025)     Assessment and Plan   Diagnoses and all orders for this visit:    1. Left upper quadrant pain (Primary)  -     Lipase; Future  -     Comprehensive metabolic panel; Future  -     CBC (No Diff); Future  -     Urinalysis With Culture If Indicated -; Future  -     CT Abdomen Pelvis Without Contrast      - The patient's symptoms are consistent with pancreatitis, including pain radiating to the back and shoulder, and diarrhea and pancreatitis is top differential which I will evaluate with labs and CT scan.   - I feel the likelihood of splenic origin is low given the recent ultrasound results, physical exam findings, and symptoms.   -The possibility of an ulcer is also considered as a differential, although the acute onset of symptoms does not strongly suggest this diagnosis.   -Renal involvement is unlikely due to the absence of urinary symptoms but will check a UA to rule out given her history of renal stones.   - A comprehensive set of tests will be conducted today, including lipase, kidney function, liver function, blood counts, and urinalysis.  - An urgent CT scan without contrast will be ordered, pending insurance pre-certification. She has been advised to discontinue Ozempic until further notice as Ozempic can cause pancreatitis and worsening abdominal issues. She is argumentative about this recommendation  - If her condition deteriorates, she should seek  immediate medical attention at the emergency room.   - Prescription Drug Monitoring Program was reviewed.    I spent 30 minutes caring for Kelli on this date of service. This time includes time spent by me in the following activities:preparing for the visit, reviewing tests, obtaining and/or reviewing a separately obtained history, performing a medically appropriate examination and/or evaluation , counseling and educating the patient/family/caregiver, ordering medications, tests, or procedures, documenting information in the medical record, independently interpreting results and communicating that information with the patient/family/caregiver, and care coordination  Follow Up   Return if symptoms worsen or fail to improve, Has appt in early June with Dr. Tidwell.  Patient was given instructions and counseling regarding her condition or for health maintenance advice. Please see specific information pulled into the AVS if appropriate.       Patient or patient representative verbalized consent for the use of Ambient Listening during the visit with  LAQUITA Leos for chart documentation. 5/13/2025  10:25 CDT

## 2025-05-23 ENCOUNTER — OFFICE VISIT (OUTPATIENT)
Dept: INTERNAL MEDICINE | Facility: CLINIC | Age: 60
End: 2025-05-23
Payer: COMMERCIAL

## 2025-05-23 VITALS
DIASTOLIC BLOOD PRESSURE: 72 MMHG | BODY MASS INDEX: 26.03 KG/M2 | OXYGEN SATURATION: 100 % | TEMPERATURE: 98 F | RESPIRATION RATE: 16 BRPM | HEIGHT: 66 IN | WEIGHT: 162 LBS | SYSTOLIC BLOOD PRESSURE: 100 MMHG | HEART RATE: 72 BPM

## 2025-05-23 DIAGNOSIS — J06.9 ACUTE URI: Primary | ICD-10-CM

## 2025-05-23 PROCEDURE — 99213 OFFICE O/P EST LOW 20 MIN: CPT

## 2025-05-23 RX ORDER — AZITHROMYCIN 250 MG/1
TABLET, FILM COATED ORAL
Qty: 6 TABLET | Refills: 0 | Status: SHIPPED | OUTPATIENT
Start: 2025-05-23

## 2025-05-23 RX ORDER — BENZONATATE 100 MG/1
100 CAPSULE ORAL EVERY 8 HOURS PRN
Qty: 30 CAPSULE | Refills: 2 | Status: SHIPPED | OUTPATIENT
Start: 2025-05-23

## 2025-05-23 NOTE — PROGRESS NOTES
"Chief Complaint  URI (Patient states she has a dry cough that is worse at night, fatigue, chills. Patient states she has chest tightness on the right side when taking a deep breath.)    Subjective    History of Present Illness      Patient presents to [facility] for      History of Present Illness  The patient is a 59-year-old female who presents for evaluation of a dry cough, fatigue, chills, and chest tightness.    She reports experiencing symptoms of a dry cough, fatigue, chills, and chest tightness, which are more pronounced during the night. These symptoms have been present for over a week and have progressively worsened. Her shortness of breath is worse at night, necessitating an elevated bed. She attempted to alleviate her symptoms with a humidifier, but it has not provided any relief. She does not report any fever or ear pain. She has not taken any over-the-counter medications such as Tylenol.    She has a history of heart problems. She describes the chest tightness as different from her previous heart-related chest pain.     Review of Systems   Constitutional:  Positive for fatigue. Negative for chills, diaphoresis and fever.   HENT:  Negative for congestion, sore throat and swollen glands.    Respiratory:  Positive for cough.    Cardiovascular:  Negative for chest pain.   Gastrointestinal:  Negative for abdominal pain, nausea and vomiting.   Genitourinary:  Negative for dysuria.   Musculoskeletal:  Negative for myalgias and neck pain.   Skin:  Negative for rash.   Neurological:  Negative for weakness and numbness.       I have reviewed and agree with the HPI and ROS information as above.  LAQUITA Rice     Objective   Vital Signs:   /72 (BP Location: Left arm, Patient Position: Sitting, Cuff Size: Adult)   Pulse 72   Temp 98 °F (36.7 °C) (Infrared)   Resp 16   Ht 167.6 cm (66\")   Wt 73.5 kg (162 lb)   SpO2 100%   BMI 26.15 kg/m²            Physical " Exam  Vitals and nursing note reviewed.   Constitutional:       Appearance: Normal appearance.   HENT:      Right Ear: Tympanic membrane and ear canal normal.      Left Ear: Tympanic membrane and ear canal normal.      Nose: Congestion present.      Mouth/Throat:      Pharynx: No posterior oropharyngeal erythema.   Cardiovascular:      Rate and Rhythm: Normal rate and regular rhythm.      Heart sounds: Normal heart sounds.   Pulmonary:      Effort: Pulmonary effort is normal.      Breath sounds: Normal breath sounds.   Neurological:      Mental Status: She is alert and oriented to person, place, and time. Mental status is at baseline.   Psychiatric:         Mood and Affect: Mood normal.         Behavior: Behavior normal.         Thought Content: Thought content normal.         Judgment: Judgment normal.                 Assessment and Plan    Assessment & Plan  1. Upper respiratory infection  - Symptoms: dry cough, fatigue, chills, chest tightness, shortness of breath (particularly at night); cough has worsened over the past week; no fever reported  - Physical examination: no alarming findings; no ear pain or throat issues  - Discussion: potential causes include sinusitis; advised condition is not contagious; can continue to work if no fever.  -Vital signs and physical exam reassuring.  - Treatment:    - Prescribe Z-Malachi (azithromycin) for potential bacterial infection    - Prescribe Tessalon Perles for cough relief (to be taken every 8 hours)  - Instructions:    - Complete antibiotic course    - Monitor symptoms    - Seek further medical attention if no improvement within a week or if symptoms worsen    Diagnoses and all orders for this visit:    1. Acute URI (Primary)  -     benzonatate (Tessalon Perles) 100 MG capsule; Take 1 capsule by mouth Every 8 (Eight) Hours As Needed for Cough.  Dispense: 30 capsule; Refill: 2  -     azithromycin (Zithromax Z-Malachi) 250 MG tablet; Take 2 tablets by mouth on day 1, then 1  tablet daily on days 2-5  Dispense: 6 tablet; Refill: 0        Patient or patient representative verbalized consent for the use of Ambient Listening during the visit with  LAQUITA Rice for chart documentation. 5/23/2025  14:28 CDT      Follow Up   Return if symptoms worsen or fail to improve.  Patient was given instructions and counseling regarding her condition or for health maintenance advice. Please see specific information pulled into the AVS if appropriate.

## 2025-05-30 DIAGNOSIS — J01.90 ACUTE NON-RECURRENT SINUSITIS, UNSPECIFIED LOCATION: Primary | ICD-10-CM

## 2025-05-30 RX ORDER — CEFDINIR 300 MG/1
300 CAPSULE ORAL 2 TIMES DAILY
Qty: 20 CAPSULE | Refills: 0 | Status: SHIPPED | OUTPATIENT
Start: 2025-05-30

## 2025-06-03 DIAGNOSIS — G43.701 CHRONIC MIGRAINE WITHOUT AURA WITH STATUS MIGRAINOSUS, NOT INTRACTABLE: ICD-10-CM

## 2025-06-03 DIAGNOSIS — F06.4 ANXIETY DISORDER DUE TO MEDICAL CONDITION: ICD-10-CM

## 2025-06-03 DIAGNOSIS — F51.04 PSYCHOPHYSIOLOGICAL INSOMNIA: ICD-10-CM

## 2025-06-03 DIAGNOSIS — E11.9 TYPE 2 DIABETES MELLITUS WITHOUT COMPLICATION, WITHOUT LONG-TERM CURRENT USE OF INSULIN: ICD-10-CM

## 2025-06-03 RX ORDER — RANOLAZINE 1000 MG/1
1000 TABLET, EXTENDED RELEASE ORAL 2 TIMES DAILY
Qty: 60 TABLET | Refills: 0 | Status: SHIPPED | OUTPATIENT
Start: 2025-06-03

## 2025-06-04 RX ORDER — HYDROCHLOROTHIAZIDE 12.5 MG/1
CAPSULE ORAL DAILY
Qty: 2 EACH | Refills: 2 | Status: SHIPPED | OUTPATIENT
Start: 2025-06-04

## 2025-06-04 RX ORDER — POTASSIUM CHLORIDE 750 MG/1
10 TABLET, EXTENDED RELEASE ORAL 2 TIMES DAILY
Qty: 180 TABLET | Refills: 0 | Status: SHIPPED | OUTPATIENT
Start: 2025-06-04

## 2025-06-04 RX ORDER — ONDANSETRON 4 MG/1
4 TABLET, ORALLY DISINTEGRATING ORAL EVERY 8 HOURS PRN
Qty: 30 TABLET | Refills: 0 | Status: SHIPPED | OUTPATIENT
Start: 2025-06-04

## 2025-06-04 RX ORDER — ESZOPICLONE 3 MG/1
3 TABLET, FILM COATED ORAL NIGHTLY
Qty: 90 TABLET | Refills: 1 | Status: SHIPPED | OUTPATIENT
Start: 2025-06-04

## 2025-06-04 RX ORDER — GALCANEZUMAB 120 MG/ML
1 INJECTION, SOLUTION SUBCUTANEOUS
Qty: 1 ML | Refills: 5 | Status: SHIPPED | OUTPATIENT
Start: 2025-06-04

## 2025-06-04 RX ORDER — PAROXETINE 40 MG/1
40 TABLET, FILM COATED ORAL EVERY MORNING
Qty: 90 TABLET | Refills: 1 | Status: SHIPPED | OUTPATIENT
Start: 2025-06-04 | End: 2025-06-05 | Stop reason: DRUGHIGH

## 2025-06-05 ENCOUNTER — OFFICE VISIT (OUTPATIENT)
Dept: INTERNAL MEDICINE | Facility: CLINIC | Age: 60
End: 2025-06-05
Payer: COMMERCIAL

## 2025-06-05 VITALS
TEMPERATURE: 97.1 F | DIASTOLIC BLOOD PRESSURE: 68 MMHG | OXYGEN SATURATION: 100 % | WEIGHT: 158 LBS | HEIGHT: 66 IN | HEART RATE: 73 BPM | BODY MASS INDEX: 25.39 KG/M2 | SYSTOLIC BLOOD PRESSURE: 118 MMHG

## 2025-06-05 DIAGNOSIS — R74.8 ELEVATED ALKALINE PHOSPHATASE LEVEL: ICD-10-CM

## 2025-06-05 DIAGNOSIS — F06.4 ANXIETY DISORDER DUE TO MEDICAL CONDITION: ICD-10-CM

## 2025-06-05 DIAGNOSIS — I95.1 ORTHOSTASIS: ICD-10-CM

## 2025-06-05 DIAGNOSIS — E11.9 TYPE 2 DIABETES MELLITUS WITHOUT COMPLICATION, WITHOUT LONG-TERM CURRENT USE OF INSULIN: Primary | ICD-10-CM

## 2025-06-05 LAB
EXPIRATION DATE: NORMAL
HBA1C MFR BLD: 5.1 % (ref 4.5–5.7)
Lab: NORMAL

## 2025-06-05 RX ORDER — HYDROXYZINE HYDROCHLORIDE 50 MG/1
50 TABLET, FILM COATED ORAL EVERY 6 HOURS PRN
Qty: 120 TABLET | Refills: 1 | Status: SHIPPED | OUTPATIENT
Start: 2025-06-05

## 2025-06-05 RX ORDER — PAROXETINE HYDROCHLORIDE HEMIHYDRATE 25 MG/1
50 TABLET, FILM COATED, EXTENDED RELEASE ORAL EVERY MORNING
Qty: 60 TABLET | Refills: 1 | Status: SHIPPED | OUTPATIENT
Start: 2025-06-05

## 2025-06-05 NOTE — PROGRESS NOTES
"    Chief Complaint  Follow-up (3 month follow up.  Patient states blood sugar has been running low at night.  It has been down to 54 within the past week.  Would like to discuss Ozempic dosage. ) and Anxiety (Patient would like to discuss increasing Paxil. )    Subjective        Kelli ePdro presents to [facility]  Anxiety  See below.     Objective   Vital Signs:  /68   Pulse 73   Temp 97.1 °F (36.2 °C) (Temporal)   Ht 167.6 cm (66\")   Wt 71.7 kg (158 lb)   SpO2 100%   BMI 25.50 kg/m²   Estimated body mass index is 25.5 kg/m² as calculated from the following:    Height as of this encounter: 167.6 cm (66\").    Weight as of this encounter: 71.7 kg (158 lb).         Physical Exam  HENT:      Head: Normocephalic and atraumatic.   Eyes:      Conjunctiva/sclera: Conjunctivae normal.      Pupils: Pupils are equal, round, and reactive to light.   Cardiovascular:      Rate and Rhythm: Normal rate and regular rhythm.      Heart sounds: Normal heart sounds.   Pulmonary:      Effort: Pulmonary effort is normal. No respiratory distress.      Breath sounds: Normal breath sounds.   Musculoskeletal:         General: No swelling.      Cervical back: Neck supple.   Skin:     General: Skin is warm and dry.      Findings: No rash.   Neurological:      General: No focal deficit present.      Mental Status: She is alert and oriented to person, place, and time.   Psychiatric:         Mood and Affect: Mood normal.         Behavior: Behavior normal.         Thought Content: Thought content normal.         Judgment: Judgment normal.        Result Review :  CMP on 5/13 was unremarkable other than a sodium that was 1 point below normal at 134.  Lipase was normal at 66 on 5/13.   CBC was normal on 5/13.    Labs from 5/1:  Iron studies showed a slightly elevated ferritin, but were otherwise normal.  Fractionated alkaline phosphatase was normal.  Alpha 1 antitrypsin was 97 with the lower limit of " normal being 101.  Feel this is an equivocal finding.  Ceruloplasmin was normal at 24.  STEVE was positive, which can be a completely nonspecific finding.    SPEP and UPEP were recently unremarkable.    Last lipids in March.   Urine microalbumin normal in March.   TSH 4.790 with free T4 of 1.26 in March.          Assessment and Plan   Diagnoses and all orders for this visit:    1. Type 2 diabetes mellitus without complication, without long-term current use of insulin (Primary)  -     POC Glycated Hemoglobin, Total  -     Semaglutide,0.25 or 0.5MG/DOS, (OZEMPIC) 2 MG/3ML solution pen-injector; Inject 0.5 mg under the skin into the appropriate area as directed 1 (One) Time Per Week.  Dispense: 3 mL; Refill: 0    2. Anxiety disorder due to medical condition  -     hydrOXYzine (ATARAX) 50 MG tablet; Take 1 tablet by mouth Every 6 (Six) Hours As Needed for Anxiety.  Dispense: 120 tablet; Refill: 1  -     PARoxetine CR (Paxil CR) 25 MG 24 hr tablet; Take 2 tablets by mouth Every Morning.  Dispense: 60 tablet; Refill: 1    3. Elevated alkaline phosphatase level    4. Orthostasis       Presents today for follow-up.     Hemoglobin A1c is 5.1.    She is very worried about the decline in her blood sugars.  I was very honest with her today.  I myself do not believe that she has ever been diabetic.  I based this off of the above hemoglobin A1c values.  She had previously seen a nurse practitioner at a private endocrinology office in [place].  I am not sure how they came up with the diagnosis of diabetes for her.  However, once they did, she was able to get a Dexcom and then also able to get semaglutide.  I have carried on this diagnosis in our chart based on their diagnosis.  I feel at this point she should decrease the semaglutide to 0.5 mg.  She is reluctant to completely stop it because she is worried about weight gain.    She states that her anxiety is still very heightened.  She also has a lot of social anxiety.  She is  very startled by loud noises and does not like being in crowds.  She feels that the Paxil is effective and she is very worried about choosing something different.  She wanted me to double her Paxil to 80 mg today.  We do not use that as a dose.  The only thing that we can do is place her on Paxil CR and choose a 50 mg dose for it.  This would be topped out as well.  She did not do well with BuSpar and we changed this to hydroxyzine back in October.  It does not make her sleepy or cause any other side effects so we will let her take 50 mg as needed rather than 25.     She had a high concerns over an alkaline phosphatase level that had been slightly elevated throughout the late 2024 and early 2025 timeframe.  I had felt like this was related to healing of her sternotomy, but she persisted with her concerns so she was ultimately sent to GI.  Since that was done, her alkaline phosphatase level has now fallen below 100 with an otherwise negative liver workup.    She was dealing orthostatic problems around 6 weeks ago.  Dr. Bay had been working through some things.  She is now on alternating doses of midodrine.  She has not had to use any about the last week.  She is checking her blood pressure 3-4 times per day.  She follows with LAQUITA Snell on 6/24.     Back in 6 months at this point. Call with problems. Annual due in March.       Follow Up   Return in about 6 months (around 12/5/2025).  Patient was given instructions and counseling regarding her condition or for health maintenance advice. Please see specific information pulled into the AVS if appropriate.      HALLE Tidwell DO       Electronically signed by ELLEN Tidwell DO, 06/05/25, 9:39 AM CDT.

## 2025-06-21 ENCOUNTER — RESULTS FOLLOW-UP (OUTPATIENT)
Age: 60
End: 2025-06-21

## 2025-06-21 ENCOUNTER — OFFICE VISIT (OUTPATIENT)
Age: 60
End: 2025-06-21

## 2025-06-21 VITALS
HEIGHT: 66 IN | BODY MASS INDEX: 25.68 KG/M2 | TEMPERATURE: 98.6 F | SYSTOLIC BLOOD PRESSURE: 113 MMHG | HEART RATE: 85 BPM | WEIGHT: 159.8 LBS | RESPIRATION RATE: 20 BRPM | OXYGEN SATURATION: 100 % | DIASTOLIC BLOOD PRESSURE: 75 MMHG

## 2025-06-21 DIAGNOSIS — J06.9 UPPER RESPIRATORY TRACT INFECTION, UNSPECIFIED TYPE: Primary | ICD-10-CM

## 2025-06-21 LAB
B PARAP IS1001 DNA NPH QL NAA+NON-PROBE: NOT DETECTED
B PERT.PT PRMT NPH QL NAA+NON-PROBE: NOT DETECTED
C PNEUM DNA NPH QL NAA+NON-PROBE: NOT DETECTED
FLUAV RNA NPH QL NAA+NON-PROBE: NOT DETECTED
FLUBV RNA NPH QL NAA+NON-PROBE: NOT DETECTED
HADV DNA NPH QL NAA+NON-PROBE: NOT DETECTED
HCOV 229E RNA NPH QL NAA+NON-PROBE: NOT DETECTED
HCOV HKU1 RNA NPH QL NAA+NON-PROBE: NOT DETECTED
HCOV NL63 RNA NPH QL NAA+NON-PROBE: NOT DETECTED
HCOV OC43 RNA NPH QL NAA+NON-PROBE: NOT DETECTED
HMPV RNA NPH QL NAA+NON-PROBE: NOT DETECTED
HPIV1 RNA NPH QL NAA+NON-PROBE: NOT DETECTED
HPIV2 RNA NPH QL NAA+NON-PROBE: NOT DETECTED
HPIV3 RNA NPH QL NAA+NON-PROBE: NOT DETECTED
HPIV4 RNA NPH QL NAA+NON-PROBE: NOT DETECTED
M PNEUMO DNA NPH QL NAA+NON-PROBE: NOT DETECTED
RSV RNA NPH QL NAA+NON-PROBE: NOT DETECTED
RV+EV RNA NPH QL NAA+NON-PROBE: DETECTED
S PYO AG THROAT QL: NORMAL
SARS-COV-2 RNA NPH QL NAA+NON-PROBE: NOT DETECTED

## 2025-06-21 RX ORDER — PAROXETINE 40 MG/1
40 TABLET, FILM COATED ORAL EVERY MORNING
COMMUNITY
Start: 2025-06-03

## 2025-06-21 RX ORDER — MIDODRINE HYDROCHLORIDE 10 MG/1
10 TABLET ORAL
COMMUNITY
Start: 2025-04-30

## 2025-06-21 RX ORDER — CYCLOSPORINE 0 G/ML
SOLUTION/ DROPS OPHTHALMIC; TOPICAL
COMMUNITY

## 2025-06-21 RX ORDER — BENZONATATE 100 MG/1
100 CAPSULE ORAL EVERY 8 HOURS PRN
COMMUNITY
Start: 2025-05-23

## 2025-06-21 RX ORDER — ASPIRIN 81 MG/1
81 TABLET ORAL DAILY
COMMUNITY

## 2025-06-21 RX ORDER — LEFLUNOMIDE 10 MG/1
1 TABLET ORAL EVERY OTHER DAY
COMMUNITY

## 2025-06-21 RX ORDER — HYDROCHLOROTHIAZIDE 12.5 MG/1
12.5 CAPSULE ORAL DAILY
COMMUNITY

## 2025-06-21 RX ORDER — HYDROCHLOROTHIAZIDE 12.5 MG/1
CAPSULE ORAL
COMMUNITY
Start: 2025-06-03

## 2025-06-21 RX ORDER — METHYLPREDNISOLONE 4 MG/1
TABLET ORAL
Qty: 1 KIT | Refills: 0 | Status: SHIPPED | OUTPATIENT
Start: 2025-06-21 | End: 2025-06-27

## 2025-06-21 RX ORDER — POTASSIUM CHLORIDE 750 MG/1
10 TABLET, EXTENDED RELEASE ORAL 2 TIMES DAILY
COMMUNITY
Start: 2025-06-03

## 2025-06-21 RX ORDER — FERROUS SULFATE 325(65) MG
325 TABLET ORAL
COMMUNITY
Start: 2024-11-08

## 2025-06-21 RX ORDER — PAROXETINE HYDROCHLORIDE HEMIHYDRATE 25 MG/1
50 TABLET, FILM COATED, EXTENDED RELEASE ORAL EVERY MORNING
COMMUNITY
Start: 2025-06-05

## 2025-06-21 RX ORDER — MIDODRINE HYDROCHLORIDE 2.5 MG/1
2.5 TABLET ORAL NIGHTLY PRN
COMMUNITY
Start: 2025-04-30

## 2025-06-21 RX ORDER — ATORVASTATIN CALCIUM 40 MG/1
40 TABLET, FILM COATED ORAL DAILY
COMMUNITY
Start: 2024-06-18 | End: 2025-06-29

## 2025-06-21 RX ORDER — ZOLPIDEM TARTRATE 12.5 MG/1
TABLET, FILM COATED, EXTENDED RELEASE ORAL
COMMUNITY

## 2025-06-21 RX ORDER — BENZONATATE 200 MG/1
200 CAPSULE ORAL 3 TIMES DAILY PRN
Qty: 30 CAPSULE | Refills: 0 | Status: SHIPPED | OUTPATIENT
Start: 2025-06-21 | End: 2025-07-01

## 2025-06-21 RX ORDER — VALACYCLOVIR HYDROCHLORIDE 1 G/1
TABLET, FILM COATED ORAL
COMMUNITY

## 2025-06-21 RX ORDER — MAGNESIUM OXIDE 400 MG/1
400 TABLET ORAL
COMMUNITY

## 2025-06-21 RX ORDER — HYDROXYCHLOROQUINE SULFATE 200 MG/1
1 TABLET, FILM COATED ORAL DAILY
COMMUNITY

## 2025-06-21 RX ORDER — MIDODRINE HYDROCHLORIDE 5 MG/1
TABLET ORAL
COMMUNITY
Start: 2025-04-21

## 2025-06-21 RX ORDER — MYCOPHENOLATE MOFETIL 500 MG/1
500 TABLET ORAL DAILY
COMMUNITY

## 2025-06-21 RX ORDER — PILOCARPINE HYDROCHLORIDE 5 MG/1
1 TABLET, FILM COATED ORAL 3 TIMES DAILY
COMMUNITY

## 2025-06-21 RX ORDER — SEMAGLUTIDE 1.34 MG/ML
INJECTION, SOLUTION SUBCUTANEOUS
COMMUNITY

## 2025-06-21 RX ORDER — METOPROLOL SUCCINATE 25 MG/1
25 TABLET, EXTENDED RELEASE ORAL DAILY
COMMUNITY
Start: 2025-02-28

## 2025-06-21 RX ORDER — BUSPIRONE HYDROCHLORIDE 5 MG/1
5 TABLET ORAL 3 TIMES DAILY PRN
COMMUNITY

## 2025-06-21 RX ORDER — RIZATRIPTAN BENZOATE 5 MG/1
TABLET ORAL
COMMUNITY

## 2025-06-21 RX ORDER — PERFLUOROHEXYLOCTANE 1 MG/MG
SOLUTION OPHTHALMIC
COMMUNITY
Start: 2025-06-10

## 2025-06-21 ASSESSMENT — ENCOUNTER SYMPTOMS
SORE THROAT: 1
COUGH: 1
RHINORRHEA: 0
VOMITING: 0
SHORTNESS OF BREATH: 0
WHEEZING: 0

## 2025-06-21 NOTE — PROGRESS NOTES
Sig Dispense Refill    aspirin 81 MG EC tablet Take 1 tablet by mouth daily      atorvastatin (LIPITOR) 40 MG tablet Take 1 tablet by mouth daily      benzonatate (TESSALON) 100 MG capsule Take 1 capsule by mouth every 8 hours as needed      busPIRone (BUSPAR) 5 MG tablet Take 1 tablet by mouth 3 times daily as needed      Continuous Glucose Sensor (FREESTYLE NILDA 3 PLUS SENSOR) MISC USE AS DIRECTED TO monitor glucose levels. CHANGE EVERY 15 DAYS      cycloSPORINE, PF, (CEQUA) 0.09 % SOLN INSTILL 1 DROP INTO EACH EYE TWICE DAILY      ferrous sulfate (IRON 325) 325 (65 Fe) MG tablet Take 1 tablet by mouth three times a week      hydroCHLOROthiazide 12.5 MG capsule Take 1 capsule by mouth daily      hydroxychloroquine (PLAQUENIL) 200 MG tablet Take 1 tablet by mouth daily      leflunomide (ARAVA) 10 MG tablet Take 1 tablet by mouth every other day      magnesium oxide (MAG-OX) 400 MG tablet Take 1 tablet by mouth      metoprolol succinate (TOPROL XL) 25 MG extended release tablet Take 1 tablet by mouth daily      midodrine (PROAMATINE) 10 MG tablet Take 1 tablet by mouth 2 times daily (before meals)      midodrine (PROAMATINE) 2.5 MG tablet Take 1 tablet by mouth nightly as needed      midodrine (PROAMATINE) 5 MG tablet TAKE ONE TABLET BY MOUTH THREE TIMES DAILY BEFORE MEALS      mycophenolate (CELLCEPT) 500 MG tablet Take 1 tablet by mouth daily      naloxone 4 MG/0.1ML LIQD nasal spray 1 spray by Nasal route as needed      naloxone (NARCAN) 4 MG/0.1ML LIQD nasal spray 1 spray by Nasal route as needed      PARoxetine (PAXIL) 40 MG tablet Take 1 tablet by mouth every morning      PARoxetine (PAXIL CR) 25 MG extended release tablet Take 2 tablets by mouth every morning      MIEBO 1.338 GM/ML SOLN Instill 1 drop into both eyes four times a day      pilocarpine (SALAGEN) 5 MG tablet Take 1 tablet by mouth 3 times daily      potassium chloride (KLOR-CON) 10 MEQ extended release tablet Take 1 tablet by mouth 2 times daily

## 2025-06-23 ENCOUNTER — TELEPHONE (OUTPATIENT)
Dept: CARDIOLOGY | Facility: CLINIC | Age: 60
End: 2025-06-23

## 2025-06-23 NOTE — TELEPHONE ENCOUNTER
"  Hub staff attempted to follow warm transfer process and was unsuccessful     Caller: Kelli Pedro \"Carli\"    Relationship to patient: Self    Best call back number: [phone]     Patient is needing: PATIENT CALLING IN AGAIN TO CHECK THE STATUS OF THE TELEPHONE ENCOUNTER FROM EARLIER TODAY. PLEASE CALL PATIENT TO ADVISE. THANK YOU        "

## 2025-06-23 NOTE — TELEPHONE ENCOUNTER
"  Hub staff attempted to follow warm transfer process and was unsuccessful     Caller: Kelli Pedro \"Carli\"    Relationship to patient: Self    Best call back number: [phone]    Patient is needing: PT IS EXPERIENCING ACTIVE SYMPTOMS INCLUDING SWOLLEN AND RED LYMPH NODES NEXT TO HER PACEMAKERS, SORE THROAT, COUGHING, PAIN AND BURNING NEAR HER PACEMAKER, FEVER. PT WENT TO THE ED ON SATURDAY AND WAS TOLD SHE HAD RHINOVIRUS. PT HAS AN APPT TOMORROW BUT SHE IS WONDERING IF SHE CAN COME IN SOMETIME TODAY DUE TO THE SEVERITY OF THE ISSUES. PLS CALL PT AT ABOVE NUMBER - THANKS  "

## 2025-06-24 ENCOUNTER — OFFICE VISIT (OUTPATIENT)
Dept: CARDIOLOGY | Facility: CLINIC | Age: 60
End: 2025-06-24
Payer: COMMERCIAL

## 2025-06-24 ENCOUNTER — RESULTS FOLLOW-UP (OUTPATIENT)
Dept: CARDIOLOGY | Facility: CLINIC | Age: 60
End: 2025-06-24

## 2025-06-24 ENCOUNTER — HOSPITAL ENCOUNTER (OUTPATIENT)
Dept: CT IMAGING | Facility: HOSPITAL | Age: 60
Discharge: HOME | End: 2025-06-24
Admitting: NURSE PRACTITIONER
Payer: COMMERCIAL

## 2025-06-24 ENCOUNTER — TELEPHONE (OUTPATIENT)
Dept: CARDIOLOGY | Facility: CLINIC | Age: 60
End: 2025-06-24

## 2025-06-24 VITALS
OXYGEN SATURATION: 99 % | WEIGHT: 159 LBS | DIASTOLIC BLOOD PRESSURE: 66 MMHG | HEIGHT: 66 IN | HEART RATE: 67 BPM | BODY MASS INDEX: 25.55 KG/M2 | SYSTOLIC BLOOD PRESSURE: 122 MMHG

## 2025-06-24 DIAGNOSIS — I71.21 ANEURYSM OF ASCENDING AORTA WITHOUT RUPTURE: ICD-10-CM

## 2025-06-24 DIAGNOSIS — I95.1 ORTHOSTASIS: ICD-10-CM

## 2025-06-24 DIAGNOSIS — R22.1 LOCALIZED SWELLING, MASS AND LUMP, NECK: ICD-10-CM

## 2025-06-24 DIAGNOSIS — Q24.5 MYOCARDIAL BRIDGE: ICD-10-CM

## 2025-06-24 DIAGNOSIS — Z95.2 S/P AVR (AORTIC VALVE REPLACEMENT): ICD-10-CM

## 2025-06-24 DIAGNOSIS — R22.1 LOCALIZED SWELLING, MASS AND LUMP, NECK: Primary | ICD-10-CM

## 2025-06-24 DIAGNOSIS — Z95.0 PRESENCE OF CARDIAC PACEMAKER: ICD-10-CM

## 2025-06-24 PROCEDURE — 99214 OFFICE O/P EST MOD 30 MIN: CPT | Performed by: NURSE PRACTITIONER

## 2025-06-24 PROCEDURE — 70492 CT SFT TSUE NCK W/O & W/DYE: CPT

## 2025-06-24 PROCEDURE — 25510000001 IOPAMIDOL 61 % SOLUTION: Performed by: NURSE PRACTITIONER

## 2025-06-24 RX ORDER — MIDODRINE HYDROCHLORIDE 5 MG/1
TABLET ORAL
COMMUNITY
Start: 2025-04-21

## 2025-06-24 RX ORDER — BENZONATATE 200 MG/1
200 CAPSULE ORAL
COMMUNITY
Start: 2025-06-21 | End: 2025-07-02

## 2025-06-24 RX ORDER — LEFLUNOMIDE 10 MG/1
10 TABLET ORAL EVERY OTHER DAY
COMMUNITY

## 2025-06-24 RX ORDER — PERFLUOROHEXYLOCTANE 1 MG/MG
SOLUTION OPHTHALMIC
COMMUNITY
Start: 2025-06-10

## 2025-06-24 RX ORDER — IOPAMIDOL 612 MG/ML
100 INJECTION, SOLUTION INTRAVASCULAR
Status: COMPLETED | OUTPATIENT
Start: 2025-06-24 | End: 2025-06-24

## 2025-06-24 RX ORDER — CLINDAMYCIN HYDROCHLORIDE 300 MG/1
300 CAPSULE ORAL 3 TIMES DAILY
Qty: 30 CAPSULE | Refills: 0 | Status: SHIPPED | OUTPATIENT
Start: 2025-06-24 | End: 2025-06-30

## 2025-06-24 RX ORDER — ATORVASTATIN CALCIUM 40 MG/1
40 TABLET, FILM COATED ORAL DAILY
COMMUNITY
Start: 2024-06-18 | End: 2025-06-30

## 2025-06-24 RX ADMIN — IOPAMIDOL 100 ML: 612 INJECTION, SOLUTION INTRAVENOUS at 10:43

## 2025-06-24 NOTE — PROGRESS NOTES
"  Subjective:     Encounter Date:06/24/2025      Patient ID: Kelli Pedro is a 59 y.o. female.    Chief Complaint:\"I think I need antibiotics for a swollen lymphnode\"  Heart Murmur  Symptoms are chronic.   Onset was more than 1 month ago.   Symptoms occur daily.   Symptoms have been waxing and waning since onset.   Symptoms include chest pain.    Pertinent negative symptoms include no cough, no rash and no weakness.   Leg Swelling  Symptoms are chronic.   Onset was more than 1 year ago.   Symptoms occur daily.   Symptoms have been waxing and waning since onset.   Symptoms include chest pain.    Pertinent negative symptoms include no cough, no rash and no weakness.   Heart Problem  Symptoms are chronic.   Onset was 6 to12 months.   Symptoms include chest pain.    Pertinent negative symptoms include no cough, no rash and no weakness.   Chest Pain   Pertinent negatives include no cough, dizziness, irregular heartbeat, malaise/fatigue, near-syncope, orthopnea, palpitations, PND, shortness of breath, sputum production, syncope or weakness.     Patient presents today as a follow up. She is followed by Dr. Bay for ascending aortic aneurysm s/p ascending hemiarch replacement with AVR (Inspiris 25 mm) and myocardial bridge resection 6/18/2024 at [facility]. Her procedure was complicated by bleeding, afib and post procedure complete heart block status post pacemaker. Her last pacemaker interrogation was completed on 2/5 and revealed no arrhythmias. She was admitted to the hospital in March for syncope and received IVF and was started on midodrine 2.5mg TID that has now been titrated up to 10mg TID.    She presents today and reports from a cardiac standpoint, she is well. Her BP remains controlled with no high or low readings. She takes her midodrine pending her BP readings throughout the day. She notes she was diagnosed with Rhinovirus on Saturday and developed a lump on the right side of her clavicle area. " "She is concerned she needs an antibiotic and fears her pacemaker will get infected. She continues to deny chest pain, palpitations, dyspnea, edema, syncope and near syncope.       The following portions of the patient's history were reviewed and updated as appropriate: allergies, current medications, past family history, past medical history, past social history, past surgical history and problem list.    Allergies   Allergen Reactions    Clavulanic Acid Nausea And Vomiting    Gadolinium Other (See Comments)     \"passed out and was shaking all over. Had to spend the night at hospital\"  NAUSEA/POS SYNCOPE  \"passed out and was shaking all over. Had to spend the night at hospital\"  NAUSEA/POS SYNCOPE  \"passed out and was shaking all over. Had to spend the night at hospital\"  \"passed out and was shaking all over. Had to spend the night at hospital\"  NAUSEA/POS SYNCOPE  NAUSEA/POS SYNCOPE      Verapamil Hcl Er Anaphylaxis    Zoster Vac Recomb Adjuvanted Swelling    Flagyl [Metronidazole] Diarrhea     Abdominal pain     Arava [Leflunomide] Rash     Abnormal labs     Estradiol Rash    Hydroxychloroquine GI Intolerance     Abdominal pain     Metformin Unknown - Low Severity    Prednisone Other (See Comments)     Increase in BP    Protonix [Pantoprazole] Nausea Only and Myalgia    Tetracyclines & Related Rash    Trazodone Itching, Other (See Comments) and Unknown (See Comments)     Blurred vision  Blurred vision  Blurred vision  Blurred vision         Current Outpatient Medications:     albuterol sulfate  (90 Base) MCG/ACT inhaler, Every 4 (Four) Hours As Needed., Disp: , Rfl:     aspirin 81 MG chewable tablet, Chew 1 tablet Daily., Disp: , Rfl:     atorvastatin (LIPITOR) 40 MG tablet, Take 1 tablet by mouth Daily., Disp: , Rfl:     benzonatate (TESSALON) 200 MG capsule, Take 1 capsule by mouth., Disp: , Rfl:     Continuous Glucose Sensor (FreeStyle Aydin 3 Plus Sensor), Use Daily., Disp: 2 each, Rfl: 2    eszopiclone " (LUNESTA) 3 MG tablet, Take 1 tablet by mouth Every Night. Take immediately before bedtime, Disp: 90 tablet, Rfl: 1    ferrous sulfate 325 (65 FE) MG tablet, Take 1 tablet by mouth 3 (Three) Times a Week., Disp: 36 tablet, Rfl: 1    Galcanezumab-gnlm (Emgality) 120 MG/ML solution prefilled syringe, Inject 1 mL under the skin into the appropriate area as directed Every 30 (Thirty) Days., Disp: 1 mL, Rfl: 5    hydrOXYzine (ATARAX) 50 MG tablet, Take 1 tablet by mouth Every 6 (Six) Hours As Needed for Anxiety., Disp: 120 tablet, Rfl: 1    lansoprazole (PREVACID) 30 MG capsule, Take 1 capsule by mouth Daily., Disp: 90 capsule, Rfl: 3    leflunomide (ARAVA) 10 MG tablet, Take 1 tablet by mouth Every Other Day., Disp: , Rfl:     levothyroxine (SYNTHROID, LEVOTHROID) 75 MCG tablet, Take 1 tablet by mouth 5 (Five) Times a Week. Monday, Tuesday, Thursday, Friday and Saturday (Patient taking differently: Take 1 tablet by mouth 5 (Five) Times a Week. Monday, Tuesday, Thursday, Friday and Saturday.  1/2 pill on Wednesday), Disp: , Rfl:     linaclotide (Linzess) 145 MCG capsule capsule, Take 1 capsule by mouth Every Morning Before Breakfast., Disp: 90 capsule, Rfl: 1    magnesium oxide (MAG-OX) 400 MG tablet, Take 1 tablet by mouth Daily., Disp: 90 tablet, Rfl: 1    midodrine (PROAMATINE) 10 MG tablet, Take 1 tablet by mouth 2 (Two) Times a Day Before Meals., Disp: 60 tablet, Rfl: 11    midodrine (PROAMATINE) 2.5 MG tablet, Take 1 tablet by mouth At Night As Needed (low bp)., Disp: 90 tablet, Rfl: 3    midodrine (PROAMATINE) 5 MG tablet, TAKE ONE TABLET BY MOUTH THREE TIMES DAILY BEFORE MEALS, Disp: , Rfl:     Miebo 1.338 GM/ML solution, Instill 1 drop into both eyes four times a day, Disp: , Rfl:     mycophenolate (CELLCEPT) 500 MG tablet, Take 1 tablet by mouth Daily., Disp: 90 tablet, Rfl: 1    ondansetron ODT (ZOFRAN-ODT) 4 MG disintegrating tablet, Place 1 tablet on the tongue Every 8 (Eight) Hours As Needed for Nausea or  Vomiting., Disp: 30 tablet, Rfl: 0    PARoxetine CR (Paxil CR) 25 MG 24 hr tablet, Take 2 tablets by mouth Every Morning., Disp: 60 tablet, Rfl: 1    pilocarpine (SALAGEN) 5 MG tablet, Take 1 tablet by mouth 3 (Three) Times a Day., Disp: , Rfl:     potassium chloride (KLOR-CON M10) 10 MEQ CR tablet, Take 1 tablet by mouth 2 (Two) Times a Day., Disp: 180 tablet, Rfl: 0    ranolazine (Ranexa) 1000 MG 12 hr tablet, Take 1 tablet by mouth 2 (Two) Times a Day., Disp: 60 tablet, Rfl: 0    Semaglutide,0.25 or 0.5MG/DOS, (OZEMPIC) 2 MG/3ML solution pen-injector, Inject 0.5 mg under the skin into the appropriate area as directed 1 (One) Time Per Week., Disp: 3 mL, Rfl: 0    ubrogepant (UBRELVY) 100 MG tablet, Take 1 tablet at the onset of headache. May repeat dose x1 in 2 hours if headache is not resolved. Max dose 200 mg/24 hours., Disp: 30 tablet, Rfl: 2    Narcan 4 MG/0.1ML nasal spray, Administer 1 spray into the nostril(s) as directed by provider As Needed for Opioid Reversal. (Patient not taking: Reported on 6/24/2025), Disp: , Rfl:     nitroglycerin (NITROSTAT) 0.4 MG SL tablet, Place 1 tablet under the tongue Every 5 (Five) Minutes As Needed for Chest Pain. Take no more than 3 doses in 15 minutes. (Patient not taking: Reported on 6/24/2025), Disp: 100 tablet, Rfl: 2  No current facility-administered medications for this visit.  Past Medical History:   Diagnosis Date    AAA (abdominal aortic aneurysm)     Abnormal ECG 03/14/2023    Allergic     Anemia     Angina pectoris     Anxiety     Anxiety and depression 06/25/2024    Aortic valve replaced 07/29/2024    Arthritis     Asthma     Bicuspid aortic valve 03/14/2023    Cataract     Surgery 2022    Cholelithiasis     Chronic kidney disease     Clotting disorder     Seeing Leticia Flores at [facility] currently    Colon polyp     Coronary artery disease     Deep vein thrombosis     Dental disease     Diabetes mellitus     Diverticulitis of colon     Diverticulosis     GERD  (gastroesophageal reflux disease)     Hashimoto's thyroiditis     In [facility]    Headache     Heart murmur     Hernia     History of medical problems     Aortic aneurysms    HL (hearing loss)     Hypernatremia     In [facility]    Hypertension     Hyperthyroidism     Hypoglycemia     In [facility]    Hypothyroid     Ingrown toenail     Irritable bowel syndrome     Kidney stone     Previous haf lithotripsy    Low back pain     Lupus     Mitral valve prolapse     In the [facility] Dr. Claudio Gibson    Mixed hyperlipidemia 11/18/2024    Neuromuscular disorder     NSTEMI (non-ST elevated myocardial infarction) 12/26/2023    Obesity     Osteopenia     Pancreatitis     Peptic ulcer disease     Peptic ulceration     Plantar fasciitis     Past    Pneumonia     RBBB 03/14/2023    Scoliosis     Sinusitis     Sjogren's disease     Sleep apnea     Years ago, but no longer have since Gastric Sleeve Surgery    Tinnitus     Ulcerative colitis     Urinary tract infection     Visual impairment     Vitamin D deficiency     In [facility]. I take Vit D daily       Social History     Socioeconomic History    Marital status:    Tobacco Use    Smoking status: Former     Current packs/day: 0.25     Average packs/day: 0.3 packs/day for 44.1 years (11.0 ttl pk-yrs)     Types: Cigarettes     Start date: 1/1/1991     Quit date: 1/1/1987     Passive exposure: Past    Smokeless tobacco: Never    Tobacco comments:     [id number] for 6 months only   Vaping Use    Vaping status: Never Used   Substance and Sexual Activity    Alcohol use: Not Currently     Comment: Socially very rare    Drug use: Never    Sexual activity: Yes     Partners: Male     Birth control/protection: None, Hysterectomy     Comment: Thats one male patner i live with       Review of Systems   Constitutional: Negative for malaise/fatigue, weight gain and weight loss.   Cardiovascular:  Positive for chest pain. Negative for dyspnea on exertion, irregular heartbeat, leg  swelling, near-syncope, orthopnea, palpitations, paroxysmal nocturnal dyspnea and syncope.   Respiratory:  Negative for cough, shortness of breath, sleep disturbances due to breathing, sputum production and wheezing.    Skin:  Negative for dry skin, flushing, itching and rash.   Gastrointestinal:  Negative for hematemesis and hematochezia.   Neurological:  Negative for dizziness, light-headedness, loss of balance and weakness.   All other systems reviewed and are negative.         Objective:     Vitals reviewed.   Constitutional:       General: Not in acute distress.     Appearance: Healthy appearance. Well-developed. Not diaphoretic.   Eyes:      General: No scleral icterus.     Conjunctiva/sclera: Conjunctivae normal.      Pupils: Pupils are equal, round, and reactive to light.   HENT:      Head: Normocephalic.    Mouth/Throat:      Pharynx: No oropharyngeal exudate.   Neck:      Vascular: No JVR.   Pulmonary:      Effort: Pulmonary effort is normal. No respiratory distress.      Breath sounds: Normal breath sounds. No wheezing. No rhonchi. No rales.   Chest:      Chest wall: Not tender to palpatation.   Cardiovascular:      Normal rate. Regular rhythm.      Murmurs: There is a grade 2/6 systolic murmur.   Pulses:     Intact distal pulses.   Edema:     Peripheral edema absent.   Abdominal:      General: Bowel sounds are normal. There is no distension.      Palpations: Abdomen is soft.      Tenderness: There is no abdominal tenderness.   Musculoskeletal: Normal range of motion.      Cervical back: Normal range of motion and neck supple. Skin:     General: Skin is warm and dry.      Coloration: Skin is not pale.      Findings: No erythema or rash.        Neurological:      Mental Status: Alert, oriented to person, place, and time and oriented to person, place and time.      Deep Tendon Reflexes: Reflexes are normal and symmetric.   Psychiatric:         Behavior: Behavior normal.         Procedures  /66 (BP  "Location: Right arm, Patient Position: Sitting, Cuff Size: Adult)   Pulse 67   Ht 167.6 cm (66\")   Wt 72.1 kg (159 lb)   SpO2 99%   BMI 25.66 kg/m²     Lab Review:   I have reviewed previous office notes, device interrogations, recent labs and recent cardiac testing.           Echo 6/2024:          Assessment:          Diagnosis Plan   1. Localized swelling, mass and lump, neck  CT soft tissue neck w wo contrast      2. Orthostasis        3. S/P AVR (aortic valve replacement)        4. Aneurysm of ascending aorta without rupture        5. Myocardial bridge        6. Presence of cardiac pacemaker                   Plan:       1 Swelling of right neck- see picture above. Will obtain a CT of neck for further assessment.   2. Orthostasis- tolerating current medication regimen well. Takes Midodrine 10mg at least daily and will use 2.5mg and 5mg PRN pending BP readings.   She does not take Midodrine if her SBP is >110.  3. S/p AVR- due to bicuspid valve. echo on 3/24/25 made no mention of abnormal valve function.   4. Aneurysm of ascending aorta- s/p replacement at time of AVR.   5. Myocardial bridging- of mid LAD per cath in 12/2023. No current complaints of chest pain.  Avoid Nitro  6. Pacemaker- due to SSS following AVR. normal device function per interrogation in April.       Follow up in 4 weeks or sooner if symptoms worsen. .     I spent 30 minutes caring for Kelli on this date of service. This time includes time spent by me in the following activities:preparing for the visit, reviewing tests, obtaining and/or reviewing a separately obtained history, performing a medically appropriate examination and/or evaluation , counseling and educating the patient/family/caregiver, ordering medications, tests, or procedures, documenting information in the medical record, and care coordination      "

## 2025-06-24 NOTE — TELEPHONE ENCOUNTER
"    Hub staff attempted to follow warm transfer process and was unsuccessful     Caller: Kelli Pedro \"Carli\"    Relationship to patient: Self    Best call back number: [phone]    Patient is needing: PT CALLED IN ASKING IF CT RESULTS HAD BEEN READ FROM THIS MORNING'S SCAN. PT WENT IN TO SEE LATASHA AND HAD A CT RIGHT AFTER DUE TO SWELLING OF HER LYMPH NODES AROUND HER PACEMAKER. PT WAS TOLD THAT LATASHA WOULD BE CALLING HER BACK IN A FEW HOURS TO REVIEW SO THAT SHE KNEW WHAT TYPE OF ANTIBIOTICS TO SEND IN. PT HAS NOT RECEIVED A CALL AND STATES THE INFECTION CONTINUES AND FEELS LIKE IT IS SPREADING. PT WOULD LIKE TO HAVE MEDS SENT IN TODAY TO THE PHARMACY BEFORE IT CLOSES TO DEAL WITH THIS. SENDING AS HIGH PRIOR DUE TO SYMPTOMS.      "

## 2025-06-27 ENCOUNTER — TELEPHONE (OUTPATIENT)
Dept: INTERNAL MEDICINE | Facility: CLINIC | Age: 60
End: 2025-06-27

## 2025-06-27 NOTE — TELEPHONE ENCOUNTER
"    Caller: Kelli Pedro \"Carli\"    Relationship to patient: Self    Best call back number:     [phone]        Chief complaint: FOLLOW UP FROM A VISIT WITH CARDIOLOGY ON 6/24/2025 FOR A SPIDER BIT ON LYMPH NODE    Type of visit: OFFICE VISIT    Requested date: 6/30/2025     If rescheduling, when is the original appointment:      Additional notes:PT IS REQUESTING TO SEE EITHER DR. GARCIA OR FLORENTINO JORDAN. NO AVAIL ON SCHEDULE WITH EITHER PROVIDER.         "

## 2025-06-30 ENCOUNTER — OFFICE VISIT (OUTPATIENT)
Dept: INTERNAL MEDICINE | Facility: CLINIC | Age: 60
End: 2025-06-30
Payer: COMMERCIAL

## 2025-06-30 ENCOUNTER — LAB (OUTPATIENT)
Dept: LAB | Facility: HOSPITAL | Age: 60
End: 2025-06-30
Payer: COMMERCIAL

## 2025-06-30 VITALS
SYSTOLIC BLOOD PRESSURE: 116 MMHG | WEIGHT: 159.6 LBS | DIASTOLIC BLOOD PRESSURE: 72 MMHG | HEART RATE: 77 BPM | OXYGEN SATURATION: 99 % | HEIGHT: 66 IN | BODY MASS INDEX: 25.65 KG/M2 | TEMPERATURE: 97.5 F

## 2025-06-30 DIAGNOSIS — R30.0 DYSURIA: ICD-10-CM

## 2025-06-30 DIAGNOSIS — L03.221 CELLULITIS OF NECK: Primary | ICD-10-CM

## 2025-06-30 DIAGNOSIS — D50.9 IRON DEFICIENCY ANEMIA, UNSPECIFIED IRON DEFICIENCY ANEMIA TYPE: ICD-10-CM

## 2025-06-30 LAB
ALBUMIN SERPL-MCNC: 3.8 G/DL (ref 3.5–5)
ALBUMIN/GLOB SERPL: 1.3 G/DL (ref 1.1–2.5)
ALP SERPL-CCNC: 83 U/L (ref 24–120)
ALT SERPL W P-5'-P-CCNC: 18 U/L (ref 0–35)
ANION GAP SERPL CALCULATED.3IONS-SCNC: 4 MMOL/L (ref 4–13)
AST SERPL-CCNC: 23 U/L (ref 7–45)
AUTO MIXED CELLS #: 0.6 10*3/MM3 (ref 0.1–2.6)
AUTO MIXED CELLS %: 12.2 % (ref 0.1–24)
BILIRUB BLD-MCNC: NEGATIVE MG/DL
BILIRUB SERPL-MCNC: 0.4 MG/DL (ref 0.1–1)
BUN SERPL-MCNC: 10 MG/DL (ref 5–21)
BUN/CREAT SERPL: 11.1
CALCIUM SPEC-SCNC: 8.9 MG/DL (ref 8.6–10.5)
CHLORIDE SERPL-SCNC: 100 MMOL/L (ref 98–110)
CLARITY, POC: CLEAR
CO2 SERPL-SCNC: 28 MMOL/L (ref 24–31)
COLOR UR: NORMAL
CREAT SERPL-MCNC: 0.9 MG/DL (ref 0.5–1.4)
EGFRCR SERPLBLD CKD-EPI 2021: 73.8 ML/MIN/1.73
ERYTHROCYTE [DISTWIDTH] IN BLOOD BY AUTOMATED COUNT: 14.2 % (ref 12.3–15.4)
EXPIRATION DATE: NORMAL
FERRITIN SERPL-MCNC: 125.1 NG/ML (ref 13–150)
GLOBULIN UR ELPH-MCNC: 3 GM/DL
GLUCOSE SERPL-MCNC: 92 MG/DL (ref 65–99)
GLUCOSE UR STRIP-MCNC: NEGATIVE MG/DL
HCT VFR BLD AUTO: 41.2 % (ref 34–46.6)
HGB BLD-MCNC: 13.3 G/DL (ref 12–15.9)
IRON 24H UR-MRATE: 100 MCG/DL (ref 37–145)
IRON SATN MFR SERPL: 34 % (ref 20–50)
KETONES UR QL: NEGATIVE
LEUKOCYTE EST, POC: NEGATIVE
LYMPHOCYTES # BLD AUTO: 1.2 10*3/MM3 (ref 0.7–3.1)
LYMPHOCYTES NFR BLD AUTO: 23.6 % (ref 19.6–45.3)
Lab: NORMAL
MCH RBC QN AUTO: 30.3 PG (ref 26.6–33)
MCHC RBC AUTO-ENTMCNC: 32.3 G/DL (ref 31.5–35.7)
MCV RBC AUTO: 93.8 FL (ref 79–97)
NEUTROPHILS NFR BLD AUTO: 3.1 10*3/MM3 (ref 1.7–7)
NEUTROPHILS NFR BLD AUTO: 64.2 % (ref 42.7–76)
NITRITE UR-MCNC: NEGATIVE MG/ML
PH UR: 5.5 [PH] (ref 5–8)
PLATELET # BLD AUTO: 252 10*3/MM3 (ref 140–450)
PMV BLD AUTO: 9.3 FL (ref 6–12)
POTASSIUM SERPL-SCNC: 4.1 MMOL/L (ref 3.5–5.3)
PROT SERPL-MCNC: 6.8 G/DL (ref 6.3–8.7)
PROT UR STRIP-MCNC: NEGATIVE MG/DL
RBC # BLD AUTO: 4.39 10*6/MM3 (ref 3.77–5.28)
RBC # UR STRIP: NEGATIVE /UL
SODIUM SERPL-SCNC: 132 MMOL/L (ref 135–145)
SP GR UR: 1.03 (ref 1–1.03)
TIBC SERPL-MCNC: 298 MCG/DL (ref 298–536)
TRANSFERRIN SERPL-MCNC: 200 MG/DL (ref 200–360)
UROBILINOGEN UR QL: NORMAL
WBC NRBC COR # BLD AUTO: 4.9 10*3/MM3 (ref 3.4–10.8)

## 2025-06-30 PROCEDURE — 36415 COLL VENOUS BLD VENIPUNCTURE: CPT

## 2025-06-30 PROCEDURE — 83540 ASSAY OF IRON: CPT

## 2025-06-30 PROCEDURE — 82728 ASSAY OF FERRITIN: CPT

## 2025-06-30 PROCEDURE — 99214 OFFICE O/P EST MOD 30 MIN: CPT

## 2025-06-30 PROCEDURE — 80053 COMPREHEN METABOLIC PANEL: CPT

## 2025-06-30 PROCEDURE — 81003 URINALYSIS AUTO W/O SCOPE: CPT

## 2025-06-30 PROCEDURE — 85025 COMPLETE CBC W/AUTO DIFF WBC: CPT

## 2025-06-30 PROCEDURE — 84466 ASSAY OF TRANSFERRIN: CPT

## 2025-06-30 RX ORDER — SULFAMETHOXAZOLE AND TRIMETHOPRIM 800; 160 MG/1; MG/1
1 TABLET ORAL 2 TIMES DAILY
Qty: 14 TABLET | Refills: 0 | Status: SHIPPED | OUTPATIENT
Start: 2025-06-30 | End: 2025-07-07

## 2025-06-30 RX ORDER — SACCHAROMYCES BOULARDII 250 MG
250 CAPSULE ORAL 2 TIMES DAILY
Qty: 28 CAPSULE | Refills: 0 | Status: SHIPPED | OUTPATIENT
Start: 2025-06-30 | End: 2025-07-14

## 2025-06-30 NOTE — PROGRESS NOTES
Subjective   Kelli Pedro is a 59 y.o. female.   Chief Complaint   Patient presents with    Follow Up     Patient seen Cardiology 06/24; Localized swelling, mass and lump, neck   Prescribed Clindamycin.  Still experiencing pain/swelling.        History of Present Illness   History of Present Illness  The patient is a 59-year-old female who presents to the office today for follow-up from a visit with cardiology on 06/24/2025 for a spider bite on her lymph node. She is followed by cardiology due to a history of myocardial bridge, presence of a cardiac pacemaker, aneurysm of the ascending aorta without rupture, and orthostasis.    She was seen by Sharon Maddox from Cardiology on 6/24/25, redness, pain, and swelling had been present for about 3 days at that point - thought to likely be r/t an insect bite initially but not sure . She has been on clindamycin for approximately 6 days, which she believes has reduced some of the redness associated with the spider bite along with application of topical karin bud salve. However, she notes persistent swelling and pain in the area. She also reports intermittent fever and chills, with a maximum temperature of 101 degrees recorded a few days ago. The pain initially radiated up to her neck and down across her sternum when it first started. She sought medical attention at [facility] two days prior to her appointment with Sharon COELHO for URI (Medrol Dosepak and Tessalon Perles). She has been using Sharp Salve.    She suspects a urinary tract infection (UTI) due to severe burning sensations during urination, a symptom she has not experienced for several years. These symptoms began a few days ago. She occasionally notices abnormalities in her urine, which can appear clear but turn dark after 2 to 3 hours. This change in color has been attributed to her iron supplementation. She reports no flank pain or vaginal discharge. She experiences mild pelvic tenderness and irregular  bowel movements, which she attributes to her use of Linzess and existing gastrointestinal issues. She does not believe that clindamycin has affected her gastrointestinal function. Her daily fluid intake is primarily water.    She has previously taken Bactrim, which induced nausea, and currently has Zofran for nausea management.    She has a history of gastric sleeve surgery and recently had rhinovirus. She was prescribed Medrol Dosepak and Tessalon Perles on 06/21/2025, and a Z-Malachi on 05/23/2025.       The following portions of the patient's history were reviewed and updated as appropriate: allergies, current medications, past family history, past medical history, past social history, past surgical history and problem list.    Review of Systems    Objective   Past Medical History:   Diagnosis Date    AAA (abdominal aortic aneurysm)     Abnormal ECG 03/14/2023    Allergic     Anemia     Angina pectoris     Anxiety     Anxiety and depression 06/25/2024    Aortic valve replaced 07/29/2024    Arthritis     Asthma     Bicuspid aortic valve 03/14/2023    Cataract     Surgery 2022    Cholelithiasis     Chronic kidney disease     Clotting disorder     Seeing Leticia Flores at [facility] currently    Colon polyp     Coronary artery disease     Deep vein thrombosis     Dental disease     Diabetes mellitus     Diverticulitis of colon     Diverticulosis     GERD (gastroesophageal reflux disease)     Hashimoto's thyroiditis     In [facility]    Headache     Heart murmur     Hernia     History of medical problems     Aortic aneurysms    HL (hearing loss)     Hypernatremia     In [facility]    Hypertension     Hyperthyroidism     Hypoglycemia     In [facility]    Hypothyroid     Ingrown toenail     Irritable bowel syndrome     Kidney stone     Previous haf lithotripsy    Low back pain     Lupus     Mitral valve prolapse     In the [facility] Dr. Claudio Gibson    Mixed hyperlipidemia 11/18/2024    Neuromuscular disorder     NSTEMI  (non-ST elevated myocardial infarction) 12/26/2023    Obesity     Osteopenia     Pancreatitis     Peptic ulcer disease     Peptic ulceration     Plantar fasciitis     Past    Pneumonia     RBBB 03/14/2023    Scoliosis     Sinusitis     Sjogren's disease     Sleep apnea     Years ago, but no longer have since Gastric Sleeve Surgery    Tinnitus     Ulcerative colitis     Urinary tract infection     Visual impairment     Vitamin D deficiency     In [facility]. I take Vit D daily      Past Surgical History:   Procedure Laterality Date    ABDOMINAL AORTIC ANEURYSM REPAIR  06/18/2024    ABDOMINAL SURGERY      ANKLE SURGERY      AORTIC VALVE REPAIR/REPLACEMENT  06/2024    AORTIC VALVE SURGERY      ARTERIAL ANEURYSM REPAIR      ASCENDING ARCH/HEMIARCH REPLACEMENT  06/18/2024    BACK SURGERY      BARIATRIC SURGERY      CARDIAC CATHETERIZATION N/A 12/26/2023    Procedure: Left Heart Cath;  Surgeon: Pablito De Leon DO;  Location:  [facility];  Service: Cardiovascular;  Laterality: N/A;    CARDIAC CATHETERIZATION  12/26/2023    CARDIAC SURGERY  06/2024    CARDIAC VALVE REPLACEMENT  06/2024    CARPAL TUNNEL RELEASE Bilateral     CHOLECYSTECTOMY      COLONOSCOPY  04/24/2023    normal colon consistent with ibs    ENDOSCOPY  04/24/2023    large hitial hernia,chronic gastritis    EYE SURGERY  2022    Cataracts both eyes    FLEXIBLE SIGMOIDOSCOPY      FOOT SURGERY      GALLBLADDER SURGERY      GASTRIC RESTRICTION SURGERY  2017    HYSTERECTOMY      INSERT / REPLACE / REMOVE PACEMAKER      KIDNEY STONE SURGERY      LITHOTRIPSY      PACEMAKER IMPLANTATION  06/2024    SHOULDER SURGERY      SUBTOTAL HYSTERECTOMY      TOENAIL EXCISION      TRICUSPID VALVE REPLACEMENT      TUBAL ABDOMINAL LIGATION      UPPER GASTROINTESTINAL ENDOSCOPY      VEIN SURGERY      [facility]        Current Outpatient Medications:     albuterol sulfate  (90 Base) MCG/ACT inhaler, Every 4 (Four) Hours As Needed.,  Disp: , Rfl:     atorvastatin (LIPITOR) 40 MG tablet, Take 1 tablet by mouth Daily., Disp: , Rfl:     benzonatate (TESSALON) 200 MG capsule, Take 1 capsule by mouth., Disp: , Rfl:     Continuous Glucose Sensor (FreeStyle Aydin 3 Plus Sensor), Use Daily., Disp: 2 each, Rfl: 2    eszopiclone (LUNESTA) 3 MG tablet, Take 1 tablet by mouth Every Night. Take immediately before bedtime, Disp: 90 tablet, Rfl: 1    ferrous sulfate 325 (65 FE) MG tablet, Take 1 tablet by mouth 3 (Three) Times a Week., Disp: 36 tablet, Rfl: 1    Galcanezumab-gnlm (Emgality) 120 MG/ML solution prefilled syringe, Inject 1 mL under the skin into the appropriate area as directed Every 30 (Thirty) Days., Disp: 1 mL, Rfl: 5    hydrOXYzine (ATARAX) 50 MG tablet, Take 1 tablet by mouth Every 6 (Six) Hours As Needed for Anxiety., Disp: 120 tablet, Rfl: 1    lansoprazole (PREVACID) 30 MG capsule, Take 1 capsule by mouth Daily., Disp: 90 capsule, Rfl: 3    leflunomide (ARAVA) 10 MG tablet, Take 1 tablet by mouth Every Other Day., Disp: , Rfl:     levothyroxine (SYNTHROID, LEVOTHROID) 75 MCG tablet, Take 1 tablet by mouth 5 (Five) Times a Week. Monday, Tuesday, Thursday, Friday and Saturday (Patient taking differently: Take 1 tablet by mouth 5 (Five) Times a Week. Monday, Tuesday, Thursday, Friday and Saturday.  1/2 pill on Wednesday), Disp: , Rfl:     linaclotide (Linzess) 145 MCG capsule capsule, Take 1 capsule by mouth Every Morning Before Breakfast., Disp: 90 capsule, Rfl: 1    magnesium oxide (MAG-OX) 400 MG tablet, Take 1 tablet by mouth Daily., Disp: 90 tablet, Rfl: 1    midodrine (PROAMATINE) 10 MG tablet, Take 1 tablet by mouth 2 (Two) Times a Day Before Meals., Disp: 60 tablet, Rfl: 11    midodrine (PROAMATINE) 2.5 MG tablet, Take 1 tablet by mouth At Night As Needed (low bp)., Disp: 90 tablet, Rfl: 3    midodrine (PROAMATINE) 5 MG tablet, TAKE ONE TABLET BY MOUTH THREE TIMES DAILY BEFORE MEALS, Disp: , Rfl:     Miebo 1.338 GM/ML solution,  "Instill 1 drop into both eyes four times a day, Disp: , Rfl:     mycophenolate (CELLCEPT) 500 MG tablet, Take 1 tablet by mouth Daily., Disp: 90 tablet, Rfl: 1    Narcan 4 MG/0.1ML nasal spray, Administer 1 spray into the nostril(s) as directed by provider As Needed for Opioid Reversal., Disp: , Rfl:     nitroglycerin (NITROSTAT) 0.4 MG SL tablet, Place 1 tablet under the tongue Every 5 (Five) Minutes As Needed for Chest Pain. Take no more than 3 doses in 15 minutes., Disp: 100 tablet, Rfl: 2    ondansetron ODT (ZOFRAN-ODT) 4 MG disintegrating tablet, Place 1 tablet on the tongue Every 8 (Eight) Hours As Needed for Nausea or Vomiting., Disp: 30 tablet, Rfl: 0    PARoxetine CR (Paxil CR) 25 MG 24 hr tablet, Take 2 tablets by mouth Every Morning., Disp: 60 tablet, Rfl: 1    pilocarpine (SALAGEN) 5 MG tablet, Take 1 tablet by mouth 3 (Three) Times a Day., Disp: , Rfl:     potassium chloride (KLOR-CON M10) 10 MEQ CR tablet, Take 1 tablet by mouth 2 (Two) Times a Day., Disp: 180 tablet, Rfl: 0    ranolazine (Ranexa) 1000 MG 12 hr tablet, Take 1 tablet by mouth 2 (Two) Times a Day., Disp: 60 tablet, Rfl: 0    Semaglutide,0.25 or 0.5MG/DOS, (OZEMPIC) 2 MG/3ML solution pen-injector, Inject 0.5 mg under the skin into the appropriate area as directed 1 (One) Time Per Week., Disp: 3 mL, Rfl: 0    ubrogepant (UBRELVY) 100 MG tablet, Take 1 tablet at the onset of headache. May repeat dose x1 in 2 hours if headache is not resolved. Max dose 200 mg/24 hours., Disp: 30 tablet, Rfl: 2    saccharomyces boulardii (Florastor) 250 MG capsule, Take 1 capsule by mouth 2 (Two) Times a Day for 14 days., Disp: 28 capsule, Rfl: 0    sulfamethoxazole-trimethoprim (Bactrim DS) 800-160 MG per tablet, Take 1 tablet by mouth 2 (Two) Times a Day for 7 days., Disp: 14 tablet, Rfl: 0      /72 (BP Location: Right arm, Patient Position: Sitting, Cuff Size: Adult)   Pulse 77   Temp 97.5 °F (36.4 °C) (Temporal)   Ht 167.6 cm (66\")   Wt 72.4 kg " (159 lb 9.6 oz)   SpO2 99%   BMI 25.76 kg/m²      Body mass index is 25.76 kg/m².          Physical Exam  Vitals and nursing note reviewed.   Constitutional:       General: She is not in acute distress.     Appearance: Normal appearance. She is normal weight. She is not ill-appearing, toxic-appearing or diaphoretic.   HENT:      Head: Normocephalic and atraumatic.   Eyes:      Extraocular Movements: Extraocular movements intact.      Conjunctiva/sclera: Conjunctivae normal.      Pupils: Pupils are equal, round, and reactive to light.   Cardiovascular:      Rate and Rhythm: Normal rate and regular rhythm.      Pulses: Normal pulses.      Heart sounds: Normal heart sounds.   Pulmonary:      Effort: Pulmonary effort is normal.      Breath sounds: Normal breath sounds.   Abdominal:      General: Bowel sounds are normal.      Palpations: Abdomen is soft.      Tenderness: There is abdominal tenderness (pelvic). There is no right CVA tenderness, left CVA tenderness or guarding.   Musculoskeletal:         General: Normal range of motion.      Cervical back: Normal range of motion.   Skin:     General: Skin is warm and dry.      Findings: Erythema (slight) present.             Comments: No significant erythema noted however pain even with very light touch as well as slight swelling present, no localized lymphadenopathy appreciated   Neurological:      General: No focal deficit present.      Mental Status: She is alert and oriented to person, place, and time. Mental status is at baseline.   Psychiatric:         Mood and Affect: Mood normal.         Behavior: Behavior normal.         Thought Content: Thought content normal.         Judgment: Judgment normal.               Assessment & Plan   Diagnoses and all orders for this visit:    1. Cellulitis of neck (Primary)  -     sulfamethoxazole-trimethoprim (Bactrim DS) 800-160 MG per tablet; Take 1 tablet by mouth 2 (Two) Times a Day for 7 days.  Dispense: 14 tablet; Refill:  0    2. Dysuria  -     POCT urinalysis dipstick, automated    Other orders  -     saccharomyces boulardii (Florastor) 250 MG capsule; Take 1 capsule by mouth 2 (Two) Times a Day for 14 days.  Dispense: 28 capsule; Refill: 0                 Assessment & Plan  1. Cellulitis right side neck/upper chest.  - Persistent pain and fevers suggest current clindamycin regimen may not be effective, will discontinue.  - CT scan showed stranding of subcutaneous fat suggestive of cellulitis, no abscess or pathologic lymphadenopathy.  - Discussed potential switch to Bactrim due to persistent symptoms and allergy considerations.  - Bactrim prescription will be sent to pharmacy; advised to take Zofran 30-45 minutes prior to mitigate nausea. Monitor for improvement in redness, swelling, and pain by day 2-3 of treatment.  - Given in recent months repetitive antibiotic exposure also recommended to initiate probiotic for the next couple weeks.    2. Suspected urinary tract infection (UTI).  - Reports burning during urination and changes in urine color, indicative of possible UTI.  - Urine test will be conducted today to confirm diagnosis.  - If confirmed, Bactrim will be prescribed as it is typically effective against UTIs.  - Bactrim prescription will be sent to pharmacy; advised to take Zofran 30-45 minutes prior to mitigate nausea. Monitor for improvement in symptoms by day 2-3 of treatment.  - No positive urine culture previous to review  - Emphasis placed on importance of drinking at least 64 ounces of water daily especially while on Bactrim.  May also cause photosensitivity.    Patient or patient representative verbalized consent for the use of Ambient Listening during the visit with  LAQUITA Steinberg for chart documentation. 6/30/2025  09:06 CDT    Please note that portions of this note were completed with a voice recognition program.     Electronically signed by LAQUITA Steinberg, 06/30/25, 09:51 CDT.

## 2025-07-01 DIAGNOSIS — F06.4 ANXIETY DISORDER DUE TO MEDICAL CONDITION: ICD-10-CM

## 2025-07-01 DIAGNOSIS — E11.9 TYPE 2 DIABETES MELLITUS WITHOUT COMPLICATION, WITHOUT LONG-TERM CURRENT USE OF INSULIN: ICD-10-CM

## 2025-07-01 RX ORDER — PAROXETINE HYDROCHLORIDE HEMIHYDRATE 25 MG/1
50 TABLET, FILM COATED, EXTENDED RELEASE ORAL EVERY MORNING
Qty: 60 TABLET | Refills: 1 | OUTPATIENT
Start: 2025-07-01

## 2025-07-01 RX ORDER — HYDROCHLOROTHIAZIDE 12.5 MG/1
CAPSULE ORAL
Qty: 2 EACH | Refills: 2 | OUTPATIENT
Start: 2025-07-01

## 2025-07-01 RX ORDER — HYDROXYZINE HYDROCHLORIDE 50 MG/1
50 TABLET, FILM COATED ORAL
Qty: 120 TABLET | Refills: 1 | OUTPATIENT
Start: 2025-07-01

## 2025-07-02 RX ORDER — ONDANSETRON 4 MG/1
TABLET, ORALLY DISINTEGRATING ORAL
Qty: 30 TABLET | Refills: 0 | Status: SHIPPED | OUTPATIENT
Start: 2025-07-02

## 2025-07-07 DIAGNOSIS — F06.4 ANXIETY DISORDER DUE TO MEDICAL CONDITION: ICD-10-CM

## 2025-07-07 DIAGNOSIS — E11.9 TYPE 2 DIABETES MELLITUS WITHOUT COMPLICATION, WITHOUT LONG-TERM CURRENT USE OF INSULIN: ICD-10-CM

## 2025-07-08 RX ORDER — ONDANSETRON 4 MG/1
TABLET, ORALLY DISINTEGRATING ORAL
Qty: 30 TABLET | Refills: 0 | OUTPATIENT
Start: 2025-07-08

## 2025-07-08 RX ORDER — POTASSIUM CHLORIDE 750 MG/1
10 TABLET, EXTENDED RELEASE ORAL 2 TIMES DAILY
Qty: 180 TABLET | Refills: 0 | OUTPATIENT
Start: 2025-07-08

## 2025-07-08 RX ORDER — HYDROXYZINE HYDROCHLORIDE 50 MG/1
50 TABLET, FILM COATED ORAL EVERY 6 HOURS PRN
Qty: 120 TABLET | Refills: 1 | OUTPATIENT
Start: 2025-07-08

## 2025-07-08 RX ORDER — PAROXETINE HYDROCHLORIDE HEMIHYDRATE 25 MG/1
50 TABLET, FILM COATED, EXTENDED RELEASE ORAL EVERY MORNING
Qty: 60 TABLET | Refills: 1 | OUTPATIENT
Start: 2025-07-08

## 2025-07-09 ENCOUNTER — TELEPHONE (OUTPATIENT)
Age: 60
End: 2025-07-09
Payer: COMMERCIAL

## 2025-07-09 ENCOUNTER — TELEPHONE (OUTPATIENT)
Dept: INTERNAL MEDICINE | Facility: CLINIC | Age: 60
End: 2025-07-09

## 2025-07-09 NOTE — TELEPHONE ENCOUNTER
"    Caller: Kelli Pedro \"Carli\"    Relationship to patient: Self    Best call back number: [phone]     Chief complaint: FOOT CARE    Type of visit: FOLLOW UP    Requested date: 7.29 OR 7.30.25     If rescheduling, when is the original appointment: 7.28.25     Additional notes:SPOKE WITH PT- SHE WOULD LIKE TO SEE IF SHE CAN WORKED IN TO SEE HARVEY FREITAS - PT HAS AN APPOINTMENT ON 7.28.25- BUT IS NOT ABLE TO MAKE THAT APPOINTMENT DUE TO WORK    1ST AVAIL FOR HARVEY FREITAS IS 9.9.25    PLEASE CONTACT PT A\ND ADVISE OF SCHEDULING         "

## 2025-07-24 ENCOUNTER — OFFICE VISIT (OUTPATIENT)
Dept: CARDIOLOGY | Facility: CLINIC | Age: 60
End: 2025-07-24
Payer: COMMERCIAL

## 2025-07-24 ENCOUNTER — OFFICE VISIT (OUTPATIENT)
Age: 60
End: 2025-07-24
Payer: COMMERCIAL

## 2025-07-24 VITALS
OXYGEN SATURATION: 99 % | HEART RATE: 70 BPM | SYSTOLIC BLOOD PRESSURE: 106 MMHG | BODY MASS INDEX: 25.07 KG/M2 | HEIGHT: 66 IN | WEIGHT: 156 LBS | DIASTOLIC BLOOD PRESSURE: 66 MMHG

## 2025-07-24 DIAGNOSIS — I71.21 ANEURYSM OF ASCENDING AORTA WITHOUT RUPTURE: ICD-10-CM

## 2025-07-24 DIAGNOSIS — Z95.2 S/P AVR (AORTIC VALVE REPLACEMENT): ICD-10-CM

## 2025-07-24 DIAGNOSIS — Q24.5 MYOCARDIAL BRIDGE: ICD-10-CM

## 2025-07-24 DIAGNOSIS — Z95.0 PRESENCE OF CARDIAC PACEMAKER: ICD-10-CM

## 2025-07-24 DIAGNOSIS — R00.2 PALPITATIONS: ICD-10-CM

## 2025-07-24 DIAGNOSIS — M17.12 PRIMARY OSTEOARTHRITIS OF LEFT KNEE: Primary | ICD-10-CM

## 2025-07-24 DIAGNOSIS — I95.1 ORTHOSTASIS: Primary | ICD-10-CM

## 2025-07-24 PROCEDURE — G8427 DOCREV CUR MEDS BY ELIG CLIN: HCPCS | Performed by: PHYSICIAN ASSISTANT

## 2025-07-24 PROCEDURE — 3017F COLORECTAL CA SCREEN DOC REV: CPT | Performed by: PHYSICIAN ASSISTANT

## 2025-07-24 PROCEDURE — 99213 OFFICE O/P EST LOW 20 MIN: CPT | Performed by: PHYSICIAN ASSISTANT

## 2025-07-24 PROCEDURE — 99214 OFFICE O/P EST MOD 30 MIN: CPT | Performed by: NURSE PRACTITIONER

## 2025-07-24 PROCEDURE — 20610 DRAIN/INJ JOINT/BURSA W/O US: CPT | Performed by: PHYSICIAN ASSISTANT

## 2025-07-24 PROCEDURE — G8419 CALC BMI OUT NRM PARAM NOF/U: HCPCS | Performed by: PHYSICIAN ASSISTANT

## 2025-07-24 PROCEDURE — 1036F TOBACCO NON-USER: CPT | Performed by: PHYSICIAN ASSISTANT

## 2025-07-24 RX ORDER — BETAMETHASONE SODIUM PHOSPHATE AND BETAMETHASONE ACETATE 3; 3 MG/ML; MG/ML
6 INJECTION, SUSPENSION INTRA-ARTICULAR; INTRALESIONAL; INTRAMUSCULAR; SOFT TISSUE ONCE
Status: COMPLETED | OUTPATIENT
Start: 2025-07-24 | End: 2025-07-24

## 2025-07-24 RX ORDER — POTASSIUM CHLORIDE 750 MG/1
1 TABLET, EXTENDED RELEASE ORAL EVERY 12 HOURS SCHEDULED
COMMUNITY
Start: 2025-07-01

## 2025-07-24 RX ADMIN — BETAMETHASONE SODIUM PHOSPHATE AND BETAMETHASONE ACETATE 6 MG: 3; 3 INJECTION, SUSPENSION INTRA-ARTICULAR; INTRALESIONAL; INTRAMUSCULAR; SOFT TISSUE at 10:11

## 2025-07-24 NOTE — PROGRESS NOTES
Orthopaedic Clinic Note - Established Patient    NAME:  Sherry Fuchs   : 1965  MRN: [id number]      2025      CHIEF COMPLAINT: Increasing left knee pain      HISTORY OF PRESENT ILLNESS:   Patient is a pleasant 59-year-old female that presents to clinic today for complaints of left knee pain.  Patient does have a history of osteoarthritis to the left knee and has received joint injections in the past that do provide relief in symptoms.  Patient presents today requesting x-ray of her left knee as she feels she has newly injured the knee, possibly her meniscus.  She states that a few days ago she was walking when she felt increase in discomfort/strain throughout general left knee. She reports pain is now mainly located to medial aspect and below patella. She reports pain is intermittent, sore/achy in nature. She denies any fall, trauma, or known injury to cause pain. She did receive visco supplementation to the left knee during previous visit. She is here discuss further treatment options.     The above note copied from date of service 3/13/2025:    Is a pleasant 59-year-old comes in for increased plaints of left knee pain.  Patient denies any recent injury or trauma.  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  She will occasionally have some instability.  She is also complaining of pain in her thigh and her calf.  She has a history of varicose vein issues and has had stripping of her veins in the past.  She denies any significant swelling.  She reports a history of a DVT.    Past Medical History:        Diagnosis Date    Anxiety     Bicuspid aortic valve     Deep vein thrombosis (HCC)     Diabetes mellitus (HCC)     GERD (gastroesophageal reflux disease)     H/O Sjogren's disease     Hashimoto's thyroiditis     Headache     Heart disease     Hyperthyroidism     Lupus     Myasthenia gravis (HCC)        Past Surgical History:        Procedure Laterality Date    BACK

## 2025-07-24 NOTE — PROGRESS NOTES
"  Subjective:     Encounter Date:07/24/2025      Patient ID: Kelli Pedro is a 59 y.o. female.    Chief Complaint:\"my pacemaker is vibrating\"  Heart Murmur  Chronicity:  Chronic  Onset:  More than 1 month ago  Frequency:  Daily  Progression since onset:  Waxing and waning  Symptoms: no chest pain, no cough, no rash and no weakness    Leg Swelling  Chronicity:  Chronic  Onset:  More than 1 year ago  Frequency:  Daily  Progression since onset:  Waxing and waning  Symptoms: no chest pain, no cough, no rash and no weakness    Heart Problem  Chronicity:  Chronic  Onset:  6 to12 months  Symptoms: no chest pain, no cough, no rash and no weakness    Chest Pain   Pertinent negatives include no cough, dizziness, irregular heartbeat, malaise/fatigue, near-syncope, orthopnea, palpitations, PND, shortness of breath, sputum production, syncope or weakness.   Pacemaker Problem  Symptoms: no chest pain, no cough, no rash and no weakness      Patient presents today as a follow up. She is followed by Dr. Bay for ascending aortic aneurysm s/p ascending hemiarch replacement with AVR (Inspiris 25 mm) and myocardial bridge resection 6/18/2024 at [facility]. Her procedure was complicated by bleeding, afib and post procedure complete heart block status post pacemaker. Her last pacemaker interrogation was completed on 2/5 and revealed no arrhythmias. She was admitted to the hospital in March for syncope and received IVF and was started on midodrine 2.5mg TID that has now been titrated up to 10mg TID.    She presents today and reports the lump on the right side of her neck is gone but the area remains tender. She also reports her pacemaker has been vibrating. She notes the vibration is fleeting, causes her to be short of breath and occurs several times a week. She denies palpitations and reports she feels like it's the leads. She continues to deny chest pain, dyspnea, edema, syncope and near syncope.       The following " "portions of the patient's history were reviewed and updated as appropriate: allergies, current medications, past family history, past medical history, past social history, past surgical history and problem list.    Allergies   Allergen Reactions    Clavulanic Acid Nausea And Vomiting    Gadolinium Other (See Comments)     \"passed out and was shaking all over. Had to spend the night at hospital\"  NAUSEA/POS SYNCOPE  \"passed out and was shaking all over. Had to spend the night at hospital\"  NAUSEA/POS SYNCOPE  \"passed out and was shaking all over. Had to spend the night at hospital\"  \"passed out and was shaking all over. Had to spend the night at hospital\"  NAUSEA/POS SYNCOPE  NAUSEA/POS SYNCOPE      Verapamil Hcl Er Anaphylaxis    Zoster Vac Recomb Adjuvanted Swelling    Flagyl [Metronidazole] Diarrhea     Abdominal pain     Arava [Leflunomide] Rash     Abnormal labs     Estradiol Rash    Hydroxychloroquine GI Intolerance     Abdominal pain     Metformin Unknown - Low Severity    Prednisone Other (See Comments)     Increase in BP    Protonix [Pantoprazole] Nausea Only and Myalgia    Tetracyclines & Related Rash    Trazodone Itching, Other (See Comments) and Unknown (See Comments)     Blurred vision  Blurred vision  Blurred vision  Blurred vision         Current Outpatient Medications:     albuterol sulfate  (90 Base) MCG/ACT inhaler, Every 4 (Four) Hours As Needed., Disp: , Rfl:     Continuous Glucose Sensor (FreeStyle Aydin 3 Plus Sensor), Use Daily., Disp: 2 each, Rfl: 2    eszopiclone (LUNESTA) 3 MG tablet, Take 1 tablet by mouth Every Night. Take immediately before bedtime, Disp: 90 tablet, Rfl: 1    ferrous sulfate 325 (65 FE) MG tablet, Take 1 tablet by mouth 3 (Three) Times a Week., Disp: 36 tablet, Rfl: 1    Galcanezumab-gnlm (Emgality) 120 MG/ML solution prefilled syringe, Inject 1 mL under the skin into the appropriate area as directed Every 30 (Thirty) Days., Disp: 1 mL, Rfl: 5    hydrOXYzine " (ATARAX) 50 MG tablet, Take 1 tablet by mouth Every 6 (Six) Hours As Needed for Anxiety., Disp: 120 tablet, Rfl: 1    lansoprazole (PREVACID) 30 MG capsule, Take 1 capsule by mouth Daily., Disp: 90 capsule, Rfl: 3    levothyroxine (SYNTHROID, LEVOTHROID) 75 MCG tablet, Take 1 tablet by mouth 5 (Five) Times a Week. Monday, Tuesday, Thursday, Friday and Saturday (Patient taking differently: Take 1 tablet by mouth 5 (Five) Times a Week. Monday, Tuesday, Thursday, Friday and Saturday.  1/2 pill on Wednesday), Disp: , Rfl:     linaclotide (Linzess) 145 MCG capsule capsule, Take 1 capsule by mouth Every Morning Before Breakfast., Disp: 90 capsule, Rfl: 1    magnesium oxide (MAG-OX) 400 MG tablet, Take 1 tablet by mouth Daily., Disp: 90 tablet, Rfl: 1    midodrine (PROAMATINE) 10 MG tablet, Take 1 tablet by mouth 2 (Two) Times a Day Before Meals., Disp: 60 tablet, Rfl: 11    midodrine (PROAMATINE) 2.5 MG tablet, Take 1 tablet by mouth At Night As Needed (low bp)., Disp: 90 tablet, Rfl: 3    midodrine (PROAMATINE) 5 MG tablet, TAKE ONE TABLET BY MOUTH THREE TIMES DAILY BEFORE MEALS, Disp: , Rfl:     Miebo 1.338 GM/ML solution, Instill 1 drop into both eyes four times a day, Disp: , Rfl:     mycophenolate (CELLCEPT) 500 MG tablet, Take 1 tablet by mouth Daily., Disp: 90 tablet, Rfl: 1    Narcan 4 MG/0.1ML nasal spray, Administer 1 spray into the nostril(s) as directed by provider As Needed for Opioid Reversal., Disp: , Rfl:     nitroglycerin (NITROSTAT) 0.4 MG SL tablet, Place 1 tablet under the tongue Every 5 (Five) Minutes As Needed for Chest Pain. Take no more than 3 doses in 15 minutes., Disp: 100 tablet, Rfl: 2    ondansetron ODT (ZOFRAN-ODT) 4 MG disintegrating tablet, PLACE ONE TABLET ON THE TONGUE EVERY 8 HOURS AS NEEDED FOR NAUSEA AND VOMITING, Disp: 30 tablet, Rfl: 0    PARoxetine CR (Paxil CR) 25 MG 24 hr tablet, Take 2 tablets by mouth Every Morning., Disp: 60 tablet, Rfl: 1    pilocarpine (SALAGEN) 5 MG tablet,  Take 1 tablet by mouth 3 (Three) Times a Day., Disp: , Rfl:     potassium chloride (KLOR-CON M10) 10 MEQ CR tablet, Take 1 tablet by mouth 2 (Two) Times a Day., Disp: 180 tablet, Rfl: 0    potassium chloride 10 MEQ CR tablet, Take 1 tablet by mouth Every 12 (Twelve) Hours., Disp: , Rfl:     ranolazine (Ranexa) 1000 MG 12 hr tablet, Take 1 tablet by mouth 2 (Two) Times a Day., Disp: 60 tablet, Rfl: 0    Semaglutide,0.25 or 0.5MG/DOS, (OZEMPIC) 2 MG/3ML solution pen-injector, Inject 0.5 mg under the skin into the appropriate area as directed 1 (One) Time Per Week., Disp: 3 mL, Rfl: 0    ubrogepant (UBRELVY) 100 MG tablet, Take 1 tablet at the onset of headache. May repeat dose x1 in 2 hours if headache is not resolved. Max dose 200 mg/24 hours., Disp: 30 tablet, Rfl: 2  Past Medical History:   Diagnosis Date    AAA (abdominal aortic aneurysm)     Abnormal ECG 03/14/2023    Allergic     Anemia     Angina pectoris     Anxiety     Anxiety and depression 06/25/2024    Aortic valve replaced 07/29/2024    Arthritis     Asthma     Bicuspid aortic valve 03/14/2023    Cataract     Surgery 2022    Cholelithiasis     Chronic kidney disease     Clotting disorder     Seeing Leticia Flores at [facility] currently    Colon polyp     Coronary artery disease     Deep vein thrombosis     Dental disease     Diabetes mellitus     Diverticulitis of colon     Diverticulosis     GERD (gastroesophageal reflux disease)     Hashimoto's thyroiditis     In [facility]    Headache     Heart murmur     Hernia     History of medical problems     Aortic aneurysms    HL (hearing loss)     Hypernatremia     In [facility]    Hypertension     Hyperthyroidism     Hypoglycemia     In [facility]    Hypothyroid     Ingrown toenail     Irritable bowel syndrome     Kidney stone     Previous haf lithotripsy    Low back pain     Lupus     Mitral valve prolapse     In the [facility] Dr. Claudio Gibson    Mixed hyperlipidemia 11/18/2024    Neuromuscular disorder      NSTEMI (non-ST elevated myocardial infarction) 12/26/2023    Obesity     Osteopenia     Pancreatitis     Peptic ulcer disease     Peptic ulceration     Plantar fasciitis     Past    Pneumonia     RBBB 03/14/2023    Scoliosis     Sinusitis     Sjogren's disease     Sleep apnea     Years ago, but no longer have since Gastric Sleeve Surgery    Tinnitus     Ulcerative colitis     Urinary tract infection     Visual impairment     Vitamin D deficiency     In MyChart. I take Vit D daily       Social History     Socioeconomic History    Marital status:    Tobacco Use    Smoking status: Former     Current packs/day: 0.25     Average packs/day: 0.3 packs/day for 44.2 years (11.0 ttl pk-yrs)     Types: Cigarettes     Start date: 1/1/1991     Quit date: 1/1/1987     Passive exposure: Past    Smokeless tobacco: Never    Tobacco comments:     [id number] for 6 months only   Vaping Use    Vaping status: Never Used   Substance and Sexual Activity    Alcohol use: Not Currently     Comment: Socially very rare    Drug use: Never    Sexual activity: Yes     Partners: Male     Birth control/protection: None, Hysterectomy     Comment: Thats one male patner i live with       Review of Systems   Constitutional: Negative for malaise/fatigue, weight gain and weight loss.   Cardiovascular:  Negative for chest pain, dyspnea on exertion, irregular heartbeat, leg swelling, near-syncope, orthopnea, palpitations, paroxysmal nocturnal dyspnea and syncope.   Respiratory:  Negative for cough, shortness of breath, sleep disturbances due to breathing, sputum production and wheezing.    Skin:  Negative for dry skin, flushing, itching and rash.   Gastrointestinal:  Negative for hematemesis and hematochezia.   Neurological:  Negative for dizziness, light-headedness, loss of balance and weakness.   All other systems reviewed and are negative.         Objective:     Vitals reviewed.   Constitutional:       General: Not in acute distress.      "Appearance: Healthy appearance. Well-developed. Not diaphoretic.   Eyes:      General: No scleral icterus.     Conjunctiva/sclera: Conjunctivae normal.      Pupils: Pupils are equal, round, and reactive to light.   HENT:      Head: Normocephalic.    Mouth/Throat:      Pharynx: No oropharyngeal exudate.   Neck:      Vascular: No JVR.   Pulmonary:      Effort: Pulmonary effort is normal. No respiratory distress.      Breath sounds: Normal breath sounds. No wheezing. No rhonchi. No rales.   Chest:      Chest wall: Not tender to palpatation.   Cardiovascular:      Normal rate. Regular rhythm.      Murmurs: There is a grade 2/6 systolic murmur.   Pulses:     Intact distal pulses.   Edema:     Peripheral edema absent.   Abdominal:      General: Bowel sounds are normal. There is no distension.      Palpations: Abdomen is soft.      Tenderness: There is no abdominal tenderness.   Musculoskeletal: Normal range of motion.      Cervical back: Normal range of motion and neck supple. Skin:     General: Skin is warm and dry.      Coloration: Skin is not pale.      Findings: No erythema or rash.        Neurological:      Mental Status: Alert, oriented to person, place, and time and oriented to person, place and time.      Deep Tendon Reflexes: Reflexes are normal and symmetric.   Psychiatric:         Behavior: Behavior normal.           Procedures  /66 (BP Location: Right arm, Patient Position: Sitting, Cuff Size: Adult)   Pulse 70   Ht 167.6 cm (66\")   Wt 70.8 kg (156 lb)   SpO2 99%   BMI 25.18 kg/m²     Lab Review:   I have reviewed previous office notes, device interrogations, recent labs and recent cardiac testing.           Echo 6/2024:          Assessment:          Diagnosis Plan   1. Orthostasis        2. S/P AVR (aortic valve replacement)        3. Aneurysm of ascending aorta without rupture        4. Myocardial bridge        5. Presence of cardiac pacemaker        6. Palpitations  Holter Monitor - 72 Hour Up To " 15 Days             Plan:       1 Orthostatic hypotension- tolerating current medication regimen well. Takes Midodrine 10mg at least daily and will use 2.5mg and 5mg PRN pending BP readings.   She does not take Midodrine if her SBP is >110.  2. S/p AVR- due to bicuspid valve. echo on 3/24/25 made no mention of abnormal valve function.   3. Aneurysm of ascending aorta- s/p replacement at time of AVR.   4. Myocardial bridging- of mid LAD per cath in 12/2023. No current complaints of chest pain.  Avoid Nitro  5. Pacemaker- due to SSS following AVR. normal device function per interrogation on  7/16. Due to complaints of device vibrating with normal interrogation, will place in a 7 day holter monitor to assess for arrhythmias and possible pacemaker malfunction.   6. Palpitations- placing in 7day holter.     Follow up in 6 weeks or sooner if symptoms worsen. .     I spent 30 minutes caring for Kelli on this date of service. This time includes time spent by me in the following activities:preparing for the visit, reviewing tests, obtaining and/or reviewing a separately obtained history, performing a medically appropriate examination and/or evaluation , counseling and educating the patient/family/caregiver, ordering medications, tests, or procedures, documenting information in the medical record, and care coordination

## 2025-07-27 LAB
MC_CV_MDC_IDC_RATE_1: 150
MC_CV_MDC_IDC_RATE_1: 171
MC_CV_MDC_IDC_ZONE_ID: 2
MC_CV_MDC_IDC_ZONE_ID: 6
MDC_IDC_MSMT_BATTERY_REMAINING_LONGEVITY: 140 MO
MDC_IDC_MSMT_BATTERY_RRT_TRIGGER: 2.62
MDC_IDC_MSMT_BATTERY_STATUS: NORMAL
MDC_IDC_MSMT_BATTERY_VOLTAGE: 3.05
MDC_IDC_MSMT_LEADCHNL_RA_DTM: NORMAL
MDC_IDC_MSMT_LEADCHNL_RA_IMPEDANCE_VALUE: 532
MDC_IDC_MSMT_LEADCHNL_RA_PACING_THRESHOLD_AMPLITUDE: 1
MDC_IDC_MSMT_LEADCHNL_RA_PACING_THRESHOLD_POLARITY: NORMAL
MDC_IDC_MSMT_LEADCHNL_RA_PACING_THRESHOLD_PULSEWIDTH: 0.4
MDC_IDC_MSMT_LEADCHNL_RA_SENSING_INTR_AMPL: 2.12
MDC_IDC_MSMT_LEADCHNL_RV_DTM: NORMAL
MDC_IDC_MSMT_LEADCHNL_RV_IMPEDANCE_VALUE: 532
MDC_IDC_MSMT_LEADCHNL_RV_PACING_THRESHOLD_AMPLITUDE: 0.75
MDC_IDC_MSMT_LEADCHNL_RV_PACING_THRESHOLD_POLARITY: NORMAL
MDC_IDC_MSMT_LEADCHNL_RV_PACING_THRESHOLD_PULSEWIDTH: 0.4
MDC_IDC_MSMT_LEADCHNL_RV_SENSING_INTR_AMPL: 5.25
MDC_IDC_PG_IMPLANT_DTM: NORMAL
MDC_IDC_PG_MFG: NORMAL
MDC_IDC_PG_MODEL: NORMAL
MDC_IDC_PG_SERIAL: NORMAL
MDC_IDC_PG_TYPE: NORMAL
MDC_IDC_SESS_DTM: NORMAL
MDC_IDC_SESS_TYPE: NORMAL
MDC_IDC_SET_BRADY_AT_MODE_SWITCH_RATE: 171
MDC_IDC_SET_BRADY_LOWRATE: 60
MDC_IDC_SET_BRADY_MAX_SENSOR_RATE: 130
MDC_IDC_SET_BRADY_MAX_TRACKING_RATE: 130
MDC_IDC_SET_BRADY_MODE: NORMAL
MDC_IDC_SET_BRADY_PAV_DELAY: 220
MDC_IDC_SET_BRADY_SAV_DELAY: 190
MDC_IDC_SET_LEADCHNL_RA_PACING_AMPLITUDE: 2
MDC_IDC_SET_LEADCHNL_RA_PACING_POLARITY: NORMAL
MDC_IDC_SET_LEADCHNL_RA_PACING_PULSEWIDTH: 0.4
MDC_IDC_SET_LEADCHNL_RA_SENSING_POLARITY: NORMAL
MDC_IDC_SET_LEADCHNL_RA_SENSING_SENSITIVITY: 0.3
MDC_IDC_SET_LEADCHNL_RV_PACING_AMPLITUDE: 2
MDC_IDC_SET_LEADCHNL_RV_PACING_POLARITY: NORMAL
MDC_IDC_SET_LEADCHNL_RV_PACING_PULSEWIDTH: 0.4
MDC_IDC_SET_LEADCHNL_RV_SENSING_POLARITY: NORMAL
MDC_IDC_SET_LEADCHNL_RV_SENSING_SENSITIVITY: 1.2
MDC_IDC_SET_ZONE_STATUS: NORMAL
MDC_IDC_SET_ZONE_STATUS: NORMAL
MDC_IDC_SET_ZONE_TYPE: NORMAL
MDC_IDC_SET_ZONE_TYPE: NORMAL
MDC_IDC_STAT_AT_BURDEN_PERCENT: 0
MDC_IDC_STAT_BRADY_RA_PERCENT_PACED: 13.02
MDC_IDC_STAT_BRADY_RV_PERCENT_PACED: 100

## 2025-07-28 DIAGNOSIS — M79.89 LEG SWELLING: Primary | ICD-10-CM

## 2025-07-29 ENCOUNTER — HOSPITAL ENCOUNTER (OUTPATIENT)
Dept: ULTRASOUND IMAGING | Facility: HOSPITAL | Age: 60
Discharge: HOME | End: 2025-07-29
Admitting: INTERNAL MEDICINE
Payer: COMMERCIAL

## 2025-07-29 DIAGNOSIS — M79.89 LEG SWELLING: ICD-10-CM

## 2025-07-29 PROCEDURE — 93970 EXTREMITY STUDY: CPT

## 2025-07-30 ENCOUNTER — OFFICE VISIT (OUTPATIENT)
Dept: INTERNAL MEDICINE | Facility: CLINIC | Age: 60
End: 2025-07-30
Payer: COMMERCIAL

## 2025-07-30 VITALS
BODY MASS INDEX: 25.18 KG/M2 | OXYGEN SATURATION: 99 % | SYSTOLIC BLOOD PRESSURE: 80 MMHG | HEART RATE: 68 BPM | DIASTOLIC BLOOD PRESSURE: 64 MMHG | HEIGHT: 66 IN | TEMPERATURE: 98 F | RESPIRATION RATE: 18 BRPM

## 2025-07-30 DIAGNOSIS — M79.605 LEFT LEG PAIN: Primary | ICD-10-CM

## 2025-07-30 DIAGNOSIS — F51.04 PSYCHOPHYSIOLOGICAL INSOMNIA: ICD-10-CM

## 2025-07-30 DIAGNOSIS — E11.9 TYPE 2 DIABETES MELLITUS WITHOUT COMPLICATION, WITHOUT LONG-TERM CURRENT USE OF INSULIN: ICD-10-CM

## 2025-07-30 DIAGNOSIS — F06.4 ANXIETY DISORDER DUE TO MEDICAL CONDITION: ICD-10-CM

## 2025-07-30 RX ORDER — KETOROLAC TROMETHAMINE 30 MG/ML
30 INJECTION, SOLUTION INTRAMUSCULAR; INTRAVENOUS ONCE
Status: DISCONTINUED | OUTPATIENT
Start: 2025-07-30 | End: 2025-07-30

## 2025-07-30 RX ORDER — HYDROXYZINE HYDROCHLORIDE 50 MG/1
50 TABLET, FILM COATED ORAL EVERY 6 HOURS PRN
Qty: 120 TABLET | Refills: 1 | Status: SHIPPED | OUTPATIENT
Start: 2025-08-01

## 2025-07-30 RX ORDER — ONDANSETRON 4 MG/1
4 TABLET, ORALLY DISINTEGRATING ORAL EVERY 8 HOURS PRN
Qty: 30 TABLET | Refills: 0 | Status: SHIPPED | OUTPATIENT
Start: 2025-08-01

## 2025-07-30 RX ORDER — PAROXETINE HYDROCHLORIDE HEMIHYDRATE 25 MG/1
50 TABLET, FILM COATED, EXTENDED RELEASE ORAL EVERY MORNING
Qty: 60 TABLET | Refills: 1 | Status: SHIPPED | OUTPATIENT
Start: 2025-07-30

## 2025-07-30 RX ORDER — ESZOPICLONE 3 MG/1
3 TABLET, FILM COATED ORAL NIGHTLY
Qty: 90 TABLET | Refills: 1 | Status: SHIPPED | OUTPATIENT
Start: 2025-08-01

## 2025-07-30 RX ORDER — METHYLPREDNISOLONE 4 MG/1
TABLET ORAL
Qty: 21 TABLET | Refills: 0 | Status: SHIPPED | OUTPATIENT
Start: 2025-07-30

## 2025-07-30 RX ORDER — KETOROLAC TROMETHAMINE 30 MG/ML
30 INJECTION, SOLUTION INTRAMUSCULAR; INTRAVENOUS ONCE
Status: COMPLETED | OUTPATIENT
Start: 2025-07-30 | End: 2025-07-30

## 2025-07-30 RX ADMIN — KETOROLAC TROMETHAMINE 30 MG: 30 INJECTION, SOLUTION INTRAMUSCULAR; INTRAVENOUS at 09:04

## 2025-07-30 NOTE — PROGRESS NOTES
Chief Complaint  Leg Pain (Patient states that her left leg on the back side of calf and upper back of leg. Patient states the top of left foot with shooting pain.)    Subjective    History of Present Illness      Patient presents to [facility] for      History of Present Illness  Ms. Pedro is a 59-year-old female who presents for evaluation of leg pain.    She has been experiencing leg pain, occasionally extending to the top of her foot, for the past 2 to 3 weeks. She reports no back pain but does mention occasional hip discomfort. She has a Baker's cyst, which she believes is not the cause of her current symptoms. She has been receiving knee injections from [facility] around every 3 months due to a knee issue. She is scheduled to receive a gel injection in her knee in another 3 weeks.    Her blood pressure is typically on the lower side, and she plans to monitor it at home. She has been taking midodrine. She experiences nausea with most medications, including methylprednisolone, and even with eating and smelling. This symptom has been present since her heart surgery.    PAST SURGICAL HISTORY:  Heart surgery    Review of Systems   Constitutional:  Negative for chills, diaphoresis, fatigue and fever.   HENT:  Negative for congestion, sore throat and swollen glands.    Respiratory:  Negative for cough.    Cardiovascular:  Negative for chest pain.   Gastrointestinal:  Negative for abdominal pain, nausea and vomiting.   Genitourinary:  Negative for dysuria.   Musculoskeletal:  Negative for myalgias and neck pain.   Skin:  Negative for rash.   Neurological:  Positive for weakness. Negative for numbness.       I have reviewed and agree with the HPI and ROS information as above.  LAQUITA Rice     Objective   Vital Signs:   BP (!) 80/64 (BP Location: Left arm, Patient Position: Sitting, Cuff Size: Adult)   Pulse 68   Temp 98 °F (36.7 °C) (Infrared)    "Resp 18   Ht 167.6 cm (66\")   SpO2 99%   BMI 25.18 kg/m²            Physical Exam  Vitals and nursing note reviewed.   Constitutional:       Appearance: Normal appearance.   HENT:      Head: Normocephalic and atraumatic.   Eyes:      Conjunctiva/sclera: Conjunctivae normal.      Pupils: Pupils are equal, round, and reactive to light.   Cardiovascular:      Rate and Rhythm: Normal rate and regular rhythm.      Heart sounds: Normal heart sounds.   Pulmonary:      Effort: Pulmonary effort is normal.      Breath sounds: Normal breath sounds.   Musculoskeletal:      Left upper leg: Tenderness present.      Left lower leg: Tenderness present.   Neurological:      General: No focal deficit present.      Mental Status: She is alert and oriented to person, place, and time. Mental status is at baseline.   Psychiatric:         Mood and Affect: Mood normal.         Behavior: Behavior normal.         Thought Content: Thought content normal.         Judgment: Judgment normal.              Result Review  Data Reviewed:   The following data was reviewed by: LAQUITA Rice on 07/30/2025:  US Venous Doppler Lower Extremity Bilateral (duplex) (07/29/2025 07:46)          Assessment and Plan      Diagnoses and all orders for this visit:    1. Left leg pain (Primary)  -     Discontinue: ketorolac (TORADOL) injection 30 mg  -     methylPREDNISolone (MEDROL) 4 MG dose pack; Take as directed on package instructions.  Dispense: 21 tablet; Refill: 0  -     ketorolac (TORADOL) injection 30 mg      Assessment & Plan  1. Lumbar radiculopathy:  - Symptoms suggest lumbar radiculopathy.  - Physical exam indicates pain upon leg straightening, consistent with sciatica.  - Discussed the possibility of lumbar radiculopathy and the plan to administer a Toradol injection for pain relief.  - Toradol injection will be administered today.  - Medrol Dosepak will be prescribed to reduce inflammation.  - If no improvement, further imaging of the back " will be considered.    2. Low blood pressure:  - Blood pressure recorded at 80/64, which is lower than usual.  - Patient reports feeling fine despite low blood pressure.  - Advised to monitor blood pressure at home and report if it remains significantly low.        Follow Up   Return if symptoms worsen or fail to improve.  Patient was given instructions and counseling regarding her condition or for health maintenance advice. Please see specific information pulled into the AVS if appropriate.

## 2025-08-11 ENCOUNTER — OFFICE VISIT (OUTPATIENT)
Dept: OTOLARYNGOLOGY | Facility: CLINIC | Age: 60
End: 2025-08-11
Payer: COMMERCIAL

## 2025-08-11 VITALS — BODY MASS INDEX: 25.07 KG/M2 | HEIGHT: 66 IN | WEIGHT: 156 LBS

## 2025-08-11 DIAGNOSIS — H61.23 BILATERAL IMPACTED CERUMEN: Primary | ICD-10-CM

## 2025-08-11 PROCEDURE — 69210 REMOVE IMPACTED EAR WAX UNI: CPT | Performed by: NURSE PRACTITIONER

## 2025-08-15 RX ORDER — LIDOCAINE 50 MG/G
1 PATCH TOPICAL EVERY 24 HOURS
Qty: 9 EACH | Refills: 0 | Status: SHIPPED | OUTPATIENT
Start: 2025-08-15

## 2025-08-18 RX ORDER — FLUTICASONE PROPIONATE 50 MCG
2 SPRAY, SUSPENSION (ML) NASAL DAILY
Qty: 48 G | Refills: 3 | Status: SHIPPED | OUTPATIENT
Start: 2025-08-18

## 2025-08-22 ENCOUNTER — OFFICE VISIT (OUTPATIENT)
Dept: INTERNAL MEDICINE | Facility: CLINIC | Age: 60
End: 2025-08-22
Payer: COMMERCIAL

## 2025-08-22 ENCOUNTER — HOSPITAL ENCOUNTER (OUTPATIENT)
Dept: GENERAL RADIOLOGY | Facility: HOSPITAL | Age: 60
Discharge: HOME | End: 2025-08-22
Payer: COMMERCIAL

## 2025-08-22 VITALS
HEIGHT: 66 IN | SYSTOLIC BLOOD PRESSURE: 86 MMHG | OXYGEN SATURATION: 98 % | HEART RATE: 71 BPM | TEMPERATURE: 97.1 F | DIASTOLIC BLOOD PRESSURE: 58 MMHG | WEIGHT: 154 LBS | BODY MASS INDEX: 24.75 KG/M2

## 2025-08-22 DIAGNOSIS — M17.0 OSTEOARTHRITIS OF BOTH KNEES, UNSPECIFIED OSTEOARTHRITIS TYPE: ICD-10-CM

## 2025-08-22 DIAGNOSIS — M54.16 LUMBAR RADICULOPATHY: Primary | ICD-10-CM

## 2025-08-22 PROCEDURE — 99213 OFFICE O/P EST LOW 20 MIN: CPT

## 2025-08-22 PROCEDURE — 72100 X-RAY EXAM L-S SPINE 2/3 VWS: CPT

## 2025-08-22 RX ORDER — ONDANSETRON 4 MG/1
TABLET, ORALLY DISINTEGRATING ORAL
Qty: 30 TABLET | Refills: 0 | OUTPATIENT
Start: 2025-08-22

## 2025-08-22 RX ORDER — LIDOCAINE 50 MG/G
1 PATCH TOPICAL EVERY 24 HOURS
Qty: 30 EACH | Refills: 0 | Status: SHIPPED | OUTPATIENT
Start: 2025-08-22

## 2025-08-25 DIAGNOSIS — E11.9 TYPE 2 DIABETES MELLITUS WITHOUT COMPLICATION, WITHOUT LONG-TERM CURRENT USE OF INSULIN: ICD-10-CM

## 2025-08-25 RX ORDER — SEMAGLUTIDE 0.68 MG/ML
0.5 INJECTION, SOLUTION SUBCUTANEOUS
Qty: 3 ML | Refills: 1 | Status: SHIPPED | OUTPATIENT
Start: 2025-08-25

## 2025-08-26 RX ORDER — ONDANSETRON 4 MG/1
4 TABLET, ORALLY DISINTEGRATING ORAL EVERY 8 HOURS PRN
Qty: 30 TABLET | Refills: 0 | Status: SHIPPED | OUTPATIENT
Start: 2025-08-26

## 2025-08-26 RX ORDER — LEVOTHYROXINE SODIUM 75 UG/1
75 TABLET ORAL
Qty: 30 TABLET | Refills: 1 | Status: SHIPPED | OUTPATIENT
Start: 2025-08-26

## 2025-08-27 ENCOUNTER — TELEPHONE (OUTPATIENT)
Dept: CARDIOLOGY | Facility: CLINIC | Age: 60
End: 2025-08-27
Payer: COMMERCIAL

## 2025-08-28 ENCOUNTER — OFFICE VISIT (OUTPATIENT)
Age: 60
End: 2025-08-28
Payer: COMMERCIAL

## 2025-08-28 DIAGNOSIS — M17.12 PRIMARY OSTEOARTHRITIS OF LEFT KNEE: Primary | ICD-10-CM

## 2025-08-28 PROCEDURE — 20610 DRAIN/INJ JOINT/BURSA W/O US: CPT | Performed by: PHYSICIAN ASSISTANT

## 2025-08-29 ENCOUNTER — OFFICE VISIT (OUTPATIENT)
Dept: INTERNAL MEDICINE | Facility: CLINIC | Age: 60
End: 2025-08-29
Payer: COMMERCIAL

## 2025-08-29 ENCOUNTER — HOSPITAL ENCOUNTER (OUTPATIENT)
Dept: ULTRASOUND IMAGING | Facility: HOSPITAL | Age: 60
Discharge: HOME | End: 2025-08-29
Payer: COMMERCIAL

## 2025-08-29 ENCOUNTER — OFFICE VISIT (OUTPATIENT)
Dept: CARDIOLOGY | Facility: CLINIC | Age: 60
End: 2025-08-29
Payer: COMMERCIAL

## 2025-08-29 VITALS
BODY MASS INDEX: 24.43 KG/M2 | OXYGEN SATURATION: 88 % | TEMPERATURE: 97.1 F | HEART RATE: 55 BPM | HEIGHT: 66 IN | WEIGHT: 152 LBS | DIASTOLIC BLOOD PRESSURE: 58 MMHG | SYSTOLIC BLOOD PRESSURE: 84 MMHG

## 2025-08-29 VITALS
HEART RATE: 64 BPM | HEIGHT: 66 IN | BODY MASS INDEX: 24.59 KG/M2 | SYSTOLIC BLOOD PRESSURE: 110 MMHG | DIASTOLIC BLOOD PRESSURE: 64 MMHG | WEIGHT: 153 LBS | OXYGEN SATURATION: 99 %

## 2025-08-29 VITALS
SYSTOLIC BLOOD PRESSURE: 110 MMHG | BODY MASS INDEX: 24.59 KG/M2 | WEIGHT: 153 LBS | HEIGHT: 66 IN | HEART RATE: 62 BPM | DIASTOLIC BLOOD PRESSURE: 78 MMHG

## 2025-08-29 DIAGNOSIS — I71.21 ANEURYSM OF ASCENDING AORTA WITHOUT RUPTURE: ICD-10-CM

## 2025-08-29 DIAGNOSIS — Q24.5 MYOCARDIAL BRIDGE: ICD-10-CM

## 2025-08-29 DIAGNOSIS — M79.605 LEFT LEG PAIN: Primary | ICD-10-CM

## 2025-08-29 DIAGNOSIS — Z95.0 PRESENCE OF CARDIAC PACEMAKER: ICD-10-CM

## 2025-08-29 DIAGNOSIS — I95.1 AUTONOMIC ORTHOSTATIC HYPOTENSION: ICD-10-CM

## 2025-08-29 DIAGNOSIS — Z95.2 S/P AVR (AORTIC VALVE REPLACEMENT): ICD-10-CM

## 2025-08-29 DIAGNOSIS — H10.9 BACTERIAL CONJUNCTIVITIS: ICD-10-CM

## 2025-08-29 DIAGNOSIS — I95.1 AUTONOMIC ORTHOSTATIC HYPOTENSION: Primary | ICD-10-CM

## 2025-08-29 DIAGNOSIS — I44.2 CHB (COMPLETE HEART BLOCK): Primary | ICD-10-CM

## 2025-08-29 PROCEDURE — 93971 EXTREMITY STUDY: CPT

## 2025-08-29 PROCEDURE — 99214 OFFICE O/P EST MOD 30 MIN: CPT | Performed by: NURSE PRACTITIONER

## 2025-08-29 PROCEDURE — 99213 OFFICE O/P EST LOW 20 MIN: CPT

## 2025-08-29 RX ORDER — OFLOXACIN 3 MG/ML
1 SOLUTION/ DROPS OPHTHALMIC 4 TIMES DAILY
Qty: 5 ML | Refills: 0 | Status: SHIPPED | OUTPATIENT
Start: 2025-08-29 | End: 2025-09-03